# Patient Record
Sex: FEMALE | Race: AMERICAN INDIAN OR ALASKA NATIVE | NOT HISPANIC OR LATINO | URBAN - METROPOLITAN AREA
[De-identification: names, ages, dates, MRNs, and addresses within clinical notes are randomized per-mention and may not be internally consistent; named-entity substitution may affect disease eponyms.]

---

## 2022-11-23 ENCOUNTER — TELEPHONE (OUTPATIENT)
Dept: CARDIOLOGY | Facility: CLINIC | Age: 47
End: 2022-11-23

## 2022-12-06 ENCOUNTER — CARE COORDINATION (OUTPATIENT)
Dept: CARDIOLOGY | Facility: CLINIC | Age: 47
End: 2022-12-06

## 2022-12-06 PROBLEM — I10 HYPERTENSION: Status: ACTIVE | Noted: 2022-12-06

## 2022-12-06 PROBLEM — Z79.4 TYPE 2 DIABETES MELLITUS WITHOUT COMPLICATION, WITH LONG-TERM CURRENT USE OF INSULIN (H): Status: ACTIVE | Noted: 2018-04-18

## 2022-12-06 PROBLEM — N17.9 AKI (ACUTE KIDNEY INJURY) (H): Status: ACTIVE | Noted: 2022-07-15

## 2022-12-06 PROBLEM — F15.10 AMPHETAMINE OR STIMULANT DRUG ABUSE (H): Status: ACTIVE | Noted: 2021-03-30

## 2022-12-06 PROBLEM — R91.1 LUNG NODULE: Status: ACTIVE | Noted: 2022-02-28

## 2022-12-06 PROBLEM — E11.9 TYPE 2 DIABETES MELLITUS WITHOUT COMPLICATION, WITH LONG-TERM CURRENT USE OF INSULIN (H): Status: ACTIVE | Noted: 2018-04-18

## 2022-12-06 PROBLEM — E87.6 HYPOKALEMIA: Status: ACTIVE | Noted: 2021-10-20

## 2022-12-06 PROBLEM — J34.3 HYPERTROPHY OF BOTH INFERIOR NASAL TURBINATES: Status: ACTIVE | Noted: 2019-03-09

## 2022-12-06 PROBLEM — I42.8 NONISCHEMIC CARDIOMYOPATHY (H): Status: ACTIVE | Noted: 2021-11-03

## 2022-12-06 PROBLEM — J34.2 DNS (DEVIATED NASAL SEPTUM): Status: ACTIVE | Noted: 2019-03-09

## 2022-12-06 RX ORDER — CARVEDILOL 3.12 MG/1
3.12 TABLET ORAL 2 TIMES DAILY
Status: ON HOLD | COMMUNITY
Start: 2022-06-15 | End: 2023-09-11

## 2022-12-06 RX ORDER — DAPAGLIFLOZIN 10 MG/1
10 TABLET, FILM COATED ORAL EVERY MORNING
Status: ON HOLD | COMMUNITY
Start: 2022-11-23 | End: 2023-09-11

## 2022-12-06 RX ORDER — ATORVASTATIN CALCIUM 20 MG/1
20 TABLET, FILM COATED ORAL AT BEDTIME
Status: ON HOLD | COMMUNITY
Start: 2022-11-23 | End: 2023-09-11

## 2022-12-06 RX ORDER — FOLIC ACID 1 MG/1
1 TABLET ORAL DAILY
Status: ON HOLD | COMMUNITY
Start: 2022-06-16 | End: 2023-09-11

## 2022-12-06 RX ORDER — METOLAZONE 2.5 MG/1
2.5 TABLET ORAL DAILY PRN
Status: ON HOLD | COMMUNITY
Start: 2022-11-09 | End: 2023-09-11

## 2022-12-06 RX ORDER — POTASSIUM CHLORIDE 1500 MG/1
60 TABLET, EXTENDED RELEASE ORAL 3 TIMES DAILY
Status: ON HOLD | COMMUNITY
Start: 2022-10-17 | End: 2023-09-11

## 2022-12-06 RX ORDER — SPIRONOLACTONE 25 MG/1
50 TABLET ORAL EVERY MORNING
Status: ON HOLD | COMMUNITY
Start: 2022-11-09 | End: 2023-09-11

## 2022-12-06 RX ORDER — SACUBITRIL AND VALSARTAN 97; 103 MG/1; MG/1
1 TABLET, FILM COATED ORAL 2 TIMES DAILY
Status: ON HOLD | COMMUNITY
End: 2023-09-11

## 2022-12-06 RX ORDER — TORSEMIDE 20 MG/1
100 TABLET ORAL 2 TIMES DAILY
COMMUNITY
Start: 2022-11-09

## 2022-12-06 NOTE — PROGRESS NOTES
December 6, 2022     Dear Colleagues,  I had the pleasure of seeing Jamilah Jiménez  in the [facility]. As you know she is a 47 year old female with HFrEF, Class III, Stage C-D, DM2, HTN, history of PE who was referred to the clinic for further evaluation and potential advanced heart failure therapies consideration. She was admitted 10/2022 following a RHC that showed cardiogenic shock with elevated filling pressures, pulmonary hypertension and low cardiac output (RA 12, PA 75/33 (48), PW 38, Amina CI 1.7). Note that she did have several ER visits over the past years.    Patient previously followed with [place] [facility]ology.  Had LHC in 2020 with no significant CAD reportedly.  Presented to Dr. Allen's clinic 6/1/22 and found to be in decompensated HF.  Was admitted for IV diuretics.  LVEF at that time reported 20-25%.  Transferred to [facility].  Noted to have ION and hypotension.  Was given IVF resuscitation, ION and hypotension felt to be 2/2 overdiuresis.  Limited TTE while hospitalized with LVEF 35-40% Reintroduced HF GDMT at reduced doses while hospitalized and discharged off diuretics but had diuretics uptitrated as outpatient.  Was admitted 7/15 with ION and hypokalemia.  Potassium was replaced and ION resolved.  Of note PCWP was 38 when weight was 169 lbs. She was diuresed with symptom improvement (less orthopnea and chest heaviness) however weight remained 168-170 lbs. Neg 9.8 L however I/O off with no intake documented at times. Had mild ION with aggressive diuresing however when slowed down, renal function improved. Entresto titrated up to high dose. With her ION, spironolactone was stopped but will reintroduce at discharge at lower dose lawanda to help her hypokalemia.   Today she notes that she continues to feel sluggish very tired and fatigued also short of breath.  Whenever she walks and does anything then she has significant cough that has not changed much at all unfortunately.  She  is unable to lay flat due to significant shortness of breath.  Overall significantly limited.     PAST MEDICAL HISTORY:  Acute on chronic systolic heart failure  Cardiogenic shock -   ION during admission  Hypokalemia  T2DM with hypergylemia  Primary HTN  Hyperlipidemia  Depression, anxiety    FAMILY HISTORY:  No relevant past family history    SOCIAL HISTORY:     CURRENT MEDICATIONS:  Coreg 3.125mg po BID  Torsemide 80mg po BID  spironolactone 12.5mg po daily  Farxiga 10 mg po daily  Entresto 97/103 mg po BID  KCL 80 meq po BID    ROS:   Constitutional: No fever, chills, or sweats.   ENT: No visual disturbance, ear ache, epistaxis, sore throat.   Allergies/Immunologic: Negative.   Respiratory: No cough, hemoptysis.   Cardiovascular: As per HPI.   GI: No nausea, vomiting, hematemesis, melena, or hematochezia.   : No urinary frequency, dysuria, or hematuria.   Integument: Negative.   Psychiatric: Pleasant, no major depression noted  Neuro: No focal neurological deficits noted  Endocrinology: Negative.   Musculoskeletal: As per HPI.      EXAM:  Today in clinic her blood pressure is 128/78 mmHg pulse rate is 84 bpm her weight is 185.2 pounds which is at least 5 pounds more than it was yesterday reportedly.  General: appears comfortable, alert and oriented  Head: normocephalic, atraumatic  Eyes: anicteric sclera, EOMI , PERRL  Neck: no adenopathy  Orophyarynx: moist mucosa, no lesions noted  Heart: regular, S1/S2, no murmurs, rubs or gallop. Estimated JVP at 7 cmH2O  Lungs: CTAB, No wheezing.   Abdomen: soft, non-tender, bowel sounds present, no hepatosplenomegaly but suspect some ascites is present  Extremities: No LE edema today  Skin: no open lesions noted  Neuro: grossly non-focal     Labs: Hypokalemia. Creatinine minimally elevated.  On discussion with the team and lab review she does have significant fluctuation in her potassium level between the twos and 5.  I suspect this is related to diuretic and  metolazone use    TTE 6/30/22:  Left Ventricle: The left ventricle is severely dilated. There is  mild-to-moderate left ventricular hypertrophy. Severely reduced left ventricular systolic function. The EF is visually estimated to be 30-35%. Severe global hypokinesis of the left ventricular wall segments.   Right Ventricle: The right ventricle appears normal in size. Systolic function is normal.   Mitral Valve: There is mild regurgitation.   IVC/SVC: Normal IVC size with normal respirophasic changes. Pulmonic Artery: There is no pulmonary hypertension.     TTE 10/6/22:  Left Ventricle: The left ventricle is severely dilated. There is mild-to-moderate left ventricular hypertrophy. The EF is visually estimated to be 30-35%. Severe global hypokinesis of the left ventricular wall segments. LVEDD 6.2cm    ASSESSMENT AND PLAN:  In summary, patient is a 47 year old very with above past medical history including recent admission for cardiogenic shock with very significant elevated wedge pressure 38 mmHg and reduced cardiac index who was referred for further evaluation for potential advanced heart failure therapies.  I am not exactly sure at this point of her underlying etiology, it seems a little strange to me that her ejection fraction is 35% yet her cardiac index was so low and the wedge pressure was significantly elevated.  Unless her blood pressure was very high at the time of the cath but reportedly it was not.  She also has a very significant family history of sudden cardiac death and heart failure including his father and multiple other more remote family members as such genetic conditions should be on the differential.  I do not find any ischemic evaluation for him, potentially it was not done in the setting of a respiratory 8 which she would not have tolerated.  I am concerned that she continues to cough and she has significant PND and orthopnea.  At this time I think it is okay for her today and A dose of  metolazone today we will check her labs as well here with the team.  We will add 5 mg of amlodipine given that her blood pressure remained a little bit above 120 mmHg systolic and I believe she will tolerate this.  She is on max dose of Entresto at this time.  Also we will do further evaluation and bring her down to [place] to do the following  - We will do repeat right heart catheterization in the device pressure is appropriate and she is able to lay flat then we will proceed with a coronary angiogram unless we can get the records from [place]  -.  Proceed with cardiac MRI with contrast to evaluate other structural abnormalities  - We will refer her for genetic testing  We will have to see how she does may need admission for further optimization.  Otherwise she is on good medical therapy would continue these.  I would not increase her beta-blocker given her cardiac index was significantly reduced on most recent catheterization.  This might be reconsidered after repeat measurements.    Disposition: We will set her up for the above study [place].  Patient and team aware and in agreement.     I appreciate the opportunity to participate in the care of Jamilah Jiménez . Please do not hesitate to contact me with any further questions.    Sincerely,   Jaret Saucedo MD  [facility] Division of Cardiology

## 2022-12-07 ENCOUNTER — TELEPHONE (OUTPATIENT)
Dept: CARDIOLOGY | Facility: CLINIC | Age: 47
End: 2022-12-07

## 2022-12-07 ENCOUNTER — OFFICE VISIT (OUTPATIENT)
Dept: CARDIOLOGY | Facility: OTHER | Age: 47
End: 2022-12-07
Payer: MEDICAID

## 2022-12-07 DIAGNOSIS — I50.21 ACUTE HFREF (HEART FAILURE WITH REDUCED EJECTION FRACTION) (H): Primary | ICD-10-CM

## 2022-12-07 DIAGNOSIS — E78.2 MIXED HYPERLIPIDEMIA: ICD-10-CM

## 2022-12-07 DIAGNOSIS — I50.20 HEART FAILURE WITH REDUCED EJECTION FRACTION, NYHA CLASS III (H): ICD-10-CM

## 2022-12-07 DIAGNOSIS — I42.8 NONISCHEMIC CARDIOMYOPATHY (H): ICD-10-CM

## 2022-12-07 DIAGNOSIS — I51.89 OTHER ILL-DEFINED HEART DISEASES: ICD-10-CM

## 2022-12-07 DIAGNOSIS — I10 BENIGN ESSENTIAL HYPERTENSION: ICD-10-CM

## 2022-12-07 DIAGNOSIS — R57.0 CARDIOGENIC SHOCK (H): ICD-10-CM

## 2022-12-07 PROCEDURE — 99204 OFFICE O/P NEW MOD 45 MIN: CPT | Performed by: INTERNAL MEDICINE

## 2022-12-07 RX ORDER — LIDOCAINE 40 MG/G
CREAM TOPICAL
Status: CANCELLED | OUTPATIENT
Start: 2022-12-07

## 2022-12-07 NOTE — PATIENT INSTRUCTIONS
You were seen today by [facility] Cardiologist, Dr. Saucedo, in partnership with [facility] in [place], [place]    Dr. Saucedo is recommending a cardiac MRI and a right heart cath to be done at [facility]. You will be called to arrange these appointments.    Genetics testing and visit is also recommended. Someone will reach out to you to help schedule this.    Jim Aguiar RN  Cardiology Care Coordinator  [phone]          Medication or Plan of Care changes:        Follow up:         Questions:    For concerns or questions regarding your heart care please contact your [facility] at [phone]. If there are any questions specifically about this visit please call [facility] at  [phone].    Follow the American Heart Association Diet and Lifestyle recommendations:    Limit saturated fat, trans fat, sodium, red meat, sweets and sugar-sweetened beverages.   If you choose to eat red meat, compare labels and select the leanest cuts available.  Aim for at least 150 minutes of moderate physical activity or 75 minutes of vigorous physical activity - or an equal combination of both - each week.

## 2022-12-07 NOTE — LETTER
Date:December 8, 2022      Patient was self referred, no letter generated. Do not send.        [employer] Health Information

## 2022-12-07 NOTE — NURSING NOTE
Chief Complaint   Patient presents with     New Patient     Unable to contact patient to review medications. Vitals will be taken by Sherwin COLÓN.

## 2022-12-07 NOTE — LETTER
12/7/2022      RE: Jamilah Jiménez  [address]  [place] [address]       Dear Colleague,    Thank you for the opportunity to participate in the care of your patient, Jamilah Jiménez, at the [facility] [employer] [place] at [facility]. Please see a copy of my visit note below.    December 6, 2022     Dear Colleagues,  I had the pleasure of seeing Jamilah Jiménez  in the [facility]. As you know she is a 47 year old female with HFrEF, Class III, Stage C-D, DM2, HTN, history of PE who was referred to the clinic for further evaluation and potential advanced heart failure therapies consideration. She was admitted 10/2022 following a RHC that showed cardiogenic shock with elevated filling pressures, pulmonary hypertension and low cardiac output (RA 12, PA 75/33 (48), PW 38, Aimna CI 1.7). Note that she did have several ER visits over the past years.    Patient previously followed with [facility].  Had LHC in 2020 with no significant CAD reportedly.  Presented to Dr. Allen's clinic 6/1/22 and found to be in decompensated HF.  Was admitted for IV diuretics.  LVEF at that time reported 20-25%.  Transferred to [facility].  Noted to have ION and hypotension.  Was given IVF resuscitation, ION and hypotension felt to be 2/2 overdiuresis.  Limited TTE while hospitalized with LVEF 35-40% Reintroduced HF GDMT at reduced doses while hospitalized and discharged off diuretics but had diuretics uptitrated as outpatient.  Was admitted 7/15 with ION and hypokalemia.  Potassium was replaced and ION resolved.  Of note PCWP was 38 when weight was 169 lbs. She was diuresed with symptom improvement (less orthopnea and chest heaviness) however weight remained 168-170 lbs. Neg 9.8 L however I/O off with no intake documented at times. Had mild ION with aggressive diuresing however when slowed down, renal function improved. Entresto titrated up to high  dose. With her ION, spironolactone was stopped but will reintroduce at discharge at lower dose lawanda to help her hypokalemia.   Today she notes that she continues to feel sluggish very tired and fatigued also short of breath.  Whenever she walks and does anything then she has significant cough that has not changed much at all unfortunately.  She is unable to lay flat due to significant shortness of breath.  Overall significantly limited.     PAST MEDICAL HISTORY:  Acute on chronic systolic heart failure  Cardiogenic shock -   ION during admission  Hypokalemia  T2DM with hypergylemia  Primary HTN  Hyperlipidemia  Depression, anxiety    FAMILY HISTORY:  No relevant past family history    SOCIAL HISTORY:     CURRENT MEDICATIONS:  Coreg 3.125mg po BID  Torsemide 80mg po BID  spironolactone 12.5mg po daily  Farxiga 10 mg po daily  Entresto 97/103 mg po BID  KCL 80 meq po BID    ROS:   Constitutional: No fever, chills, or sweats.   ENT: No visual disturbance, ear ache, epistaxis, sore throat.   Allergies/Immunologic: Negative.   Respiratory: No cough, hemoptysis.   Cardiovascular: As per HPI.   GI: No nausea, vomiting, hematemesis, melena, or hematochezia.   : No urinary frequency, dysuria, or hematuria.   Integument: Negative.   Psychiatric: Pleasant, no major depression noted  Neuro: No focal neurological deficits noted  Endocrinology: Negative.   Musculoskeletal: As per HPI.      EXAM:  Today in clinic her blood pressure is 128/78 mmHg pulse rate is 84 bpm her weight is 185.2 pounds which is at least 5 pounds more than it was yesterday reportedly.  General: appears comfortable, alert and oriented  Head: normocephalic, atraumatic  Eyes: anicteric sclera, EOMI , PERRL  Neck: no adenopathy  Orophyarynx: moist mucosa, no lesions noted  Heart: regular, S1/S2, no murmurs, rubs or gallop. Estimated JVP at 7 cmH2O  Lungs: CTAB, No wheezing.   Abdomen: soft, non-tender, bowel sounds present, no hepatosplenomegaly but  suspect some ascites is present  Extremities: No LE edema today  Skin: no open lesions noted  Neuro: grossly non-focal     Labs: Hypokalemia. Creatinine minimally elevated.  On discussion with the team and lab review she does have significant fluctuation in her potassium level between the twos and 5.  I suspect this is related to diuretic and metolazone use    TTE 6/30/22:  Left Ventricle: The left ventricle is severely dilated. There is  mild-to-moderate left ventricular hypertrophy. Severely reduced left ventricular systolic function. The EF is visually estimated to be 30-35%. Severe global hypokinesis of the left ventricular wall segments.   Right Ventricle: The right ventricle appears normal in size. Systolic function is normal.   Mitral Valve: There is mild regurgitation.   IVC/SVC: Normal IVC size with normal respirophasic changes. Pulmonic Artery: There is no pulmonary hypertension.     TTE 10/6/22:  Left Ventricle: The left ventricle is severely dilated. There is mild-to-moderate left ventricular hypertrophy. The EF is visually estimated to be 30-35%. Severe global hypokinesis of the left ventricular wall segments. LVEDD 6.2cm    ASSESSMENT AND PLAN:  In summary, patient is a 47 year old very with above past medical history including recent admission for cardiogenic shock with very significant elevated wedge pressure 38 mmHg and reduced cardiac index who was referred for further evaluation for potential advanced heart failure therapies.  I am not exactly sure at this point of her underlying etiology, it seems a little strange to me that her ejection fraction is 35% yet her cardiac index was so low and the wedge pressure was significantly elevated.  Unless her blood pressure was very high at the time of the cath but reportedly it was not.  She also has a very significant family history of sudden cardiac death and heart failure including his father and multiple other more remote family members as such genetic  conditions should be on the differential.  I do not find any ischemic evaluation for him, potentially it was not done in the setting of a respiratory 8 which she would not have tolerated.  I am concerned that she continues to cough and she has significant PND and orthopnea.  At this time I think it is okay for her today and A dose of metolazone today we will check her labs as well here with the team.  We will add 5 mg of amlodipine given that her blood pressure remained a little bit above 120 mmHg systolic and I believe she will tolerate this.  She is on max dose of Entresto at this time.  Also we will do further evaluation and bring her down to [place] to do the following  - We will do repeat right heart catheterization in the device pressure is appropriate and she is able to lay flat then we will proceed with a coronary angiogram unless we can get the records from [place]  -.  Proceed with cardiac MRI with contrast to evaluate other structural abnormalities  - We will refer her for genetic testing  We will have to see how she does may need admission for further optimization.  Otherwise she is on good medical therapy would continue these.  I would not increase her beta-blocker given her cardiac index was significantly reduced on most recent catheterization.  This might be reconsidered after repeat measurements.    Disposition: We will set her up for the above study [place].  Patient and team aware and in agreement.     I appreciate the opportunity to participate in the care of Jamilah Jiménez . Please do not hesitate to contact me with any further questions.    Sincerely,   Jaret Saucedo MD  [facility] Division of Cardiology           Please do not hesitate to contact me if you have any questions/concerns.     Sincerely,     Jaret Saucedo MD

## 2022-12-20 DIAGNOSIS — I50.20 HEART FAILURE WITH REDUCED EJECTION FRACTION, NYHA CLASS III (H): Primary | ICD-10-CM

## 2023-01-09 ENCOUNTER — TELEPHONE (OUTPATIENT)
Dept: CARDIOLOGY | Facility: CLINIC | Age: 48
End: 2023-01-09

## 2023-01-09 NOTE — TELEPHONE ENCOUNTER
This is a patient that was seen in [place]. She is scheduled for a cardiac MRI and right heart cath here on 2/28 and 3/1. Can you please schedule a follow up visit, in person or virtual, in [place] in April please

## 2023-02-28 ENCOUNTER — TELEPHONE (OUTPATIENT)
Dept: CARDIOLOGY | Facility: CLINIC | Age: 48
End: 2023-02-28

## 2023-02-28 ENCOUNTER — CARE COORDINATION (OUTPATIENT)
Dept: CARDIOLOGY | Facility: CLINIC | Age: 48
End: 2023-02-28

## 2023-02-28 DIAGNOSIS — I50.20 HEART FAILURE WITH REDUCED EJECTION FRACTION, NYHA CLASS III (H): Primary | ICD-10-CM

## 2023-02-28 DIAGNOSIS — Z45.02 ENCOUNTER FOR INTERROGATION OF CARDIAC DEFIBRILLATOR: Primary | ICD-10-CM

## 2023-02-28 NOTE — TELEPHONE ENCOUNTER
Call complete for pre procedure reminder and updated visitor policy.  Pt states due to the weather where she lives, she was unable to make it here today for all of her other appts, and has not been able to talk to anyone about it yet.  Informed her I will get a message sent to Jim regarding missed appts today and tomorrows scheduled appt.

## 2023-08-28 ENCOUNTER — PATIENT OUTREACH (OUTPATIENT)
Dept: CARDIOLOGY | Facility: CLINIC | Age: 48
End: 2023-08-28
Payer: MEDICAID

## 2023-08-28 NOTE — TELEPHONE ENCOUNTER
Called and spoke with patient and reviewed instructions for upcoming right hear catheterization on 8/31/23.    Pre-procedure instructions - Right heart catheterization  Patient Education    Your arrival time is 6:30 AM.  Location is [address] Waiting Room  Please plan on being at the hospital all day.  At any time, emergencies and/or urgent cases may come up which could delay the start of your procedure.    Pre-procedure instructions - Right heart catheterization  No solid food for 8 hours prior  Nothing to drink 2 hours prior to arrival time  You can take your morning medications (except diabetic and blood thinners) with sips of water  We recommend you arrange for a ride to drop you off and pick you up, in the instance, you are unable to drive home, however you should be able to function as you normally would after the procedure    Diabetic Medication Instructions  Typical instructions for insulin diabetic medication holding are below. However, please reach out to your Primary Care Provider or Endocrinologist for specific instructions  DO NOT take any oral diabetic medication, short-acting diabetes medications/insulin, humalog or regular insulin the morning of your test  Take   dose of long-acting insulin (Lantus, Levemir) the day of your test  Remember to  bring your glucometer and insulin with you to take after your test if needed  DO NOT take injectable GLP-1 agonists semaglutide (Ozempic, Wegovy), dulaglutide (Trulicity), exenatide ER (Bydureon), tirzepatide (Mounjaro), or oral semaglutide (Rybelsus) for 7 days prior your procedure  Hold once daily injectable GLP-1 agonists exenatide (Byetta), liraglutide (Saxenda, Victoza) the day before and day of your procedure                Anticoagulation Medication Instructions   NA    All questions answered to patient's satisfaction.

## 2023-08-31 ENCOUNTER — ANCILLARY PROCEDURE (OUTPATIENT)
Dept: CARDIOLOGY | Facility: CLINIC | Age: 48
End: 2023-08-31
Attending: INTERNAL MEDICINE
Payer: MEDICAID

## 2023-08-31 ENCOUNTER — APPOINTMENT (OUTPATIENT)
Dept: GENERAL RADIOLOGY | Facility: CLINIC | Age: 48
End: 2023-08-31
Payer: MEDICAID

## 2023-08-31 ENCOUNTER — APPOINTMENT (OUTPATIENT)
Dept: MEDSURG UNIT | Facility: CLINIC | Age: 48
End: 2023-08-31
Attending: INTERNAL MEDICINE
Payer: MEDICAID

## 2023-08-31 ENCOUNTER — HOSPITAL ENCOUNTER (OUTPATIENT)
Dept: MRI IMAGING | Facility: CLINIC | Age: 48
Discharge: HOME | End: 2023-08-31
Attending: INTERNAL MEDICINE
Payer: MEDICAID

## 2023-08-31 ENCOUNTER — HOSPITAL ENCOUNTER (OUTPATIENT)
Dept: GENERAL RADIOLOGY | Facility: CLINIC | Age: 48
Discharge: HOME | End: 2023-08-31
Attending: INTERNAL MEDICINE
Payer: MEDICAID

## 2023-08-31 ENCOUNTER — REFERRAL (OUTPATIENT)
Dept: TRANSPLANT | Facility: CLINIC | Age: 48
End: 2023-08-31

## 2023-08-31 ENCOUNTER — APPOINTMENT (OUTPATIENT)
Dept: LAB | Facility: CLINIC | Age: 48
End: 2023-08-31
Attending: INTERNAL MEDICINE
Payer: MEDICAID

## 2023-08-31 ENCOUNTER — HOSPITAL ENCOUNTER (INPATIENT)
Facility: CLINIC | Age: 48
LOS: 11 days | Discharge: HOME | DRG: 286 | End: 2023-09-11
Attending: INTERNAL MEDICINE | Admitting: INTERNAL MEDICINE
Payer: MEDICAID

## 2023-08-31 DIAGNOSIS — I50.21 ACUTE HFREF (HEART FAILURE WITH REDUCED EJECTION FRACTION) (H): ICD-10-CM

## 2023-08-31 DIAGNOSIS — R57.0 CARDIOGENIC SHOCK (H): ICD-10-CM

## 2023-08-31 DIAGNOSIS — I42.8 NONISCHEMIC CARDIOMYOPATHY (H): ICD-10-CM

## 2023-08-31 DIAGNOSIS — I50.20 HEART FAILURE WITH REDUCED EJECTION FRACTION, NYHA CLASS III (H): ICD-10-CM

## 2023-08-31 DIAGNOSIS — I50.20 HEART FAILURE WITH REDUCED EJECTION FRACTION, NYHA CLASS III (H): Primary | ICD-10-CM

## 2023-08-31 DIAGNOSIS — E78.2 MIXED HYPERLIPIDEMIA: ICD-10-CM

## 2023-08-31 DIAGNOSIS — F41.9 ANXIETY: Chronic | ICD-10-CM

## 2023-08-31 DIAGNOSIS — I51.89 OTHER ILL-DEFINED HEART DISEASES: ICD-10-CM

## 2023-08-31 DIAGNOSIS — E11.9 TYPE 2 DIABETES MELLITUS WITHOUT COMPLICATION, WITH LONG-TERM CURRENT USE OF INSULIN (H): Primary | ICD-10-CM

## 2023-08-31 DIAGNOSIS — I10 BENIGN ESSENTIAL HYPERTENSION: ICD-10-CM

## 2023-08-31 DIAGNOSIS — Z79.4 TYPE 2 DIABETES MELLITUS WITHOUT COMPLICATION, WITH LONG-TERM CURRENT USE OF INSULIN (H): Primary | ICD-10-CM

## 2023-08-31 LAB
ANION GAP SERPL CALCULATED.3IONS-SCNC: 12 MMOL/L (ref 7–15)
BUN SERPL-MCNC: 17 MG/DL (ref 6–20)
CALCIUM SERPL-MCNC: 8.3 MG/DL (ref 8.6–10)
CHLORIDE SERPL-SCNC: 104 MMOL/L (ref 98–107)
CREAT SERPL-MCNC: 1.02 MG/DL (ref 0.51–0.95)
DEPRECATED HCO3 PLAS-SCNC: 24 MMOL/L (ref 22–29)
ERYTHROCYTE [DISTWIDTH] IN BLOOD BY AUTOMATED COUNT: 14.1 % (ref 10–15)
GFR SERPL CREATININE-BSD FRML MDRD: 69 ML/MIN/1.73M2
GLUCOSE BLDC GLUCOMTR-MCNC: 177 MG/DL (ref 70–99)
GLUCOSE BLDC GLUCOMTR-MCNC: 215 MG/DL (ref 70–99)
GLUCOSE BLDC GLUCOMTR-MCNC: 227 MG/DL (ref 70–99)
GLUCOSE SERPL-MCNC: 178 MG/DL (ref 70–99)
HCT VFR BLD AUTO: 41.2 % (ref 35–47)
HGB BLD-MCNC: 13.8 G/DL (ref 11.7–15.7)
HOLD SPECIMEN: NORMAL
INR PPP: 1.05 (ref 0.85–1.15)
LVEF ECHO: NORMAL
MCH RBC QN AUTO: 27.9 PG (ref 26.5–33)
MCHC RBC AUTO-ENTMCNC: 33.5 G/DL (ref 31.5–36.5)
MCV RBC AUTO: 83 FL (ref 78–100)
NT-PROBNP SERPL-MCNC: 1425 PG/ML (ref 0–450)
PLATELET # BLD AUTO: 214 10E3/UL (ref 150–450)
POTASSIUM SERPL-SCNC: 3.4 MMOL/L (ref 3.4–5.3)
RBC # BLD AUTO: 4.95 10E6/UL (ref 3.8–5.2)
SODIUM SERPL-SCNC: 140 MMOL/L (ref 136–145)
WBC # BLD AUTO: 5.9 10E3/UL (ref 4–11)

## 2023-08-31 PROCEDURE — A9585 GADOBUTROL INJECTION: HCPCS | Mod: JZ | Performed by: INTERNAL MEDICINE

## 2023-08-31 PROCEDURE — 93451 RIGHT HEART CATH: CPT | Mod: 26 | Performed by: INTERNAL MEDICINE

## 2023-08-31 PROCEDURE — 250N000012 HC RX MED GY IP 250 OP 636 PS 637: Performed by: INTERNAL MEDICINE

## 2023-08-31 PROCEDURE — 85027 COMPLETE CBC AUTOMATED: CPT | Performed by: STUDENT IN AN ORGANIZED HEALTH CARE EDUCATION/TRAINING PROGRAM

## 2023-08-31 PROCEDURE — 71046 X-RAY EXAM CHEST 2 VIEWS: CPT

## 2023-08-31 PROCEDURE — 71046 X-RAY EXAM CHEST 2 VIEWS: CPT | Mod: 26 | Performed by: RADIOLOGY

## 2023-08-31 PROCEDURE — 250N000009 HC RX 250: Performed by: INTERNAL MEDICINE

## 2023-08-31 PROCEDURE — 99223 1ST HOSP IP/OBS HIGH 75: CPT | Mod: 25 | Performed by: INTERNAL MEDICINE

## 2023-08-31 PROCEDURE — 75561 CARDIAC MRI FOR MORPH W/DYE: CPT | Mod: 26 | Performed by: STUDENT IN AN ORGANIZED HEALTH CARE EDUCATION/TRAINING PROGRAM

## 2023-08-31 PROCEDURE — 250N000013 HC RX MED GY IP 250 OP 250 PS 637: Performed by: STUDENT IN AN ORGANIZED HEALTH CARE EDUCATION/TRAINING PROGRAM

## 2023-08-31 PROCEDURE — 93451 RIGHT HEART CATH: CPT | Performed by: INTERNAL MEDICINE

## 2023-08-31 PROCEDURE — 255N000002 HC RX 255 OP 636: Performed by: STUDENT IN AN ORGANIZED HEALTH CARE EDUCATION/TRAINING PROGRAM

## 2023-08-31 PROCEDURE — 93287 PERI-PX DEVICE EVAL & PRGR: CPT | Mod: 26 | Performed by: INTERNAL MEDICINE

## 2023-08-31 PROCEDURE — 4A023N6 MEASUREMENT OF CARDIAC SAMPLING AND PRESSURE, RIGHT HEART, PERCUTANEOUS APPROACH: ICD-10-PCS | Performed by: INTERNAL MEDICINE

## 2023-08-31 PROCEDURE — 93010 ELECTROCARDIOGRAM REPORT: CPT | Mod: XU | Performed by: INTERNAL MEDICINE

## 2023-08-31 PROCEDURE — C1751 CATH, INF, PER/CENT/MIDLINE: HCPCS | Performed by: INTERNAL MEDICINE

## 2023-08-31 PROCEDURE — 93287 PERI-PX DEVICE EVAL & PRGR: CPT

## 2023-08-31 PROCEDURE — 250N000013 HC RX MED GY IP 250 OP 250 PS 637

## 2023-08-31 PROCEDURE — 80048 BASIC METABOLIC PNL TOTAL CA: CPT | Performed by: INTERNAL MEDICINE

## 2023-08-31 PROCEDURE — 272N000001 HC OR GENERAL SUPPLY STERILE: Performed by: INTERNAL MEDICINE

## 2023-08-31 PROCEDURE — 120N000003 HC R&B IMCU UMMC

## 2023-08-31 PROCEDURE — 71045 X-RAY EXAM CHEST 1 VIEW: CPT | Mod: 26 | Performed by: RADIOLOGY

## 2023-08-31 PROCEDURE — 999N000142 HC STATISTIC PROCEDURE PREP ONLY

## 2023-08-31 PROCEDURE — 83880 ASSAY OF NATRIURETIC PEPTIDE: CPT | Performed by: INTERNAL MEDICINE

## 2023-08-31 PROCEDURE — 250N000013 HC RX MED GY IP 250 OP 250 PS 637: Performed by: INTERNAL MEDICINE

## 2023-08-31 PROCEDURE — 85610 PROTHROMBIN TIME: CPT | Performed by: INTERNAL MEDICINE

## 2023-08-31 PROCEDURE — 93005 ELECTROCARDIOGRAM TRACING: CPT

## 2023-08-31 PROCEDURE — 75561 CARDIAC MRI FOR MORPH W/DYE: CPT

## 2023-08-31 PROCEDURE — 36415 COLL VENOUS BLD VENIPUNCTURE: CPT | Performed by: INTERNAL MEDICINE

## 2023-08-31 PROCEDURE — 71045 X-RAY EXAM CHEST 1 VIEW: CPT

## 2023-08-31 PROCEDURE — 255N000002 HC RX 255 OP 636: Mod: JZ | Performed by: INTERNAL MEDICINE

## 2023-08-31 PROCEDURE — 999N000134 HC STATISTIC PP CARE STAGE 3

## 2023-08-31 RX ORDER — DIGOXIN 125 MCG
250 TABLET ORAL DAILY
Status: ON HOLD | COMMUNITY
End: 2023-09-11

## 2023-08-31 RX ORDER — SERTRALINE HYDROCHLORIDE 100 MG/1
100 TABLET, FILM COATED ORAL DAILY
Status: ON HOLD | COMMUNITY
End: 2023-09-11

## 2023-08-31 RX ORDER — DAPAGLIFLOZIN 10 MG/1
10 TABLET, FILM COATED ORAL EVERY MORNING
Status: DISCONTINUED | OUTPATIENT
Start: 2023-09-01 | End: 2023-09-02

## 2023-08-31 RX ORDER — HYDRALAZINE HYDROCHLORIDE 25 MG/1
25 TABLET, FILM COATED ORAL 3 TIMES DAILY
Status: DISCONTINUED | OUTPATIENT
Start: 2023-08-31 | End: 2023-09-02

## 2023-08-31 RX ORDER — ATORVASTATIN CALCIUM 10 MG/1
20 TABLET, FILM COATED ORAL AT BEDTIME
Status: DISCONTINUED | OUTPATIENT
Start: 2023-08-31 | End: 2023-09-02

## 2023-08-31 RX ORDER — TORSEMIDE 100 MG/1
100 TABLET ORAL 2 TIMES DAILY
Status: DISCONTINUED | OUTPATIENT
Start: 2023-08-31 | End: 2023-09-02

## 2023-08-31 RX ORDER — LIDOCAINE 40 MG/G
CREAM TOPICAL
Status: COMPLETED | OUTPATIENT
Start: 2023-08-31 | End: 2023-08-31

## 2023-08-31 RX ORDER — ACETAMINOPHEN 325 MG/1
650 TABLET ORAL EVERY 4 HOURS PRN
Status: DISCONTINUED | OUTPATIENT
Start: 2023-08-31 | End: 2023-09-02

## 2023-08-31 RX ORDER — POTASSIUM CHLORIDE 1500 MG/1
60 TABLET, EXTENDED RELEASE ORAL 3 TIMES DAILY
Status: DISCONTINUED | OUTPATIENT
Start: 2023-08-31 | End: 2023-09-02

## 2023-08-31 RX ORDER — ACETAMINOPHEN 500 MG
1000 TABLET ORAL ONCE
Status: COMPLETED | OUTPATIENT
Start: 2023-08-31 | End: 2023-08-31

## 2023-08-31 RX ORDER — SPIRONOLACTONE 25 MG/1
50 TABLET ORAL EVERY MORNING
Status: DISCONTINUED | OUTPATIENT
Start: 2023-09-01 | End: 2023-09-02

## 2023-08-31 RX ORDER — GADOBUTROL 604.72 MG/ML
10 INJECTION INTRAVENOUS ONCE
Status: COMPLETED | OUTPATIENT
Start: 2023-08-31 | End: 2023-08-31

## 2023-08-31 RX ORDER — BENZONATATE 200 MG/1
200 CAPSULE ORAL 3 TIMES DAILY PRN
Status: ON HOLD | COMMUNITY
End: 2023-09-11

## 2023-08-31 RX ORDER — NICOTINE POLACRILEX 4 MG
15-30 LOZENGE BUCCAL
Status: DISCONTINUED | OUTPATIENT
Start: 2023-08-31 | End: 2023-09-11 | Stop reason: HOSPADM

## 2023-08-31 RX ORDER — DEXTROSE MONOHYDRATE 25 G/50ML
25-50 INJECTION, SOLUTION INTRAVENOUS
Status: DISCONTINUED | OUTPATIENT
Start: 2023-08-31 | End: 2023-09-11 | Stop reason: HOSPADM

## 2023-08-31 RX ORDER — INSULIN ASPART 100 [IU]/ML
1-10 INJECTION, SOLUTION INTRAVENOUS; SUBCUTANEOUS
COMMUNITY

## 2023-08-31 RX ORDER — NITROGLYCERIN 0.4 MG/1
0.4 TABLET SUBLINGUAL EVERY 5 MIN PRN
Status: ON HOLD | COMMUNITY
End: 2023-09-11

## 2023-08-31 RX ORDER — ERGOCALCIFEROL (VITAMIN D2) 10 MCG
400 TABLET ORAL DAILY
Status: ON HOLD | COMMUNITY
End: 2023-09-11

## 2023-08-31 RX ADMIN — HYDRALAZINE HYDROCHLORIDE 25 MG: 25 TABLET, FILM COATED ORAL at 21:27

## 2023-08-31 RX ADMIN — INSULIN ASPART 1 UNITS: 100 INJECTION, SOLUTION INTRAVENOUS; SUBCUTANEOUS at 19:04

## 2023-08-31 RX ADMIN — INSULIN DETEMIR 30 UNITS: 100 INJECTION, SOLUTION SUBCUTANEOUS at 22:58

## 2023-08-31 RX ADMIN — SACUBITRIL AND VALSARTAN 1 TABLET: 97; 103 TABLET, FILM COATED ORAL at 20:55

## 2023-08-31 RX ADMIN — LIDOCAINE: 40 CREAM TOPICAL at 09:37

## 2023-08-31 RX ADMIN — ACETAMINOPHEN 1000 MG: 500 TABLET ORAL at 13:55

## 2023-08-31 RX ADMIN — POTASSIUM CHLORIDE 60 MEQ: 1500 TABLET, EXTENDED RELEASE ORAL at 20:55

## 2023-08-31 RX ADMIN — HUMAN ALBUMIN MICROSPHERES AND PERFLUTREN 6 ML: 10; .22 INJECTION, SOLUTION INTRAVENOUS at 15:33

## 2023-08-31 RX ADMIN — TORSEMIDE 100 MG: 100 TABLET ORAL at 18:25

## 2023-08-31 RX ADMIN — HYDRALAZINE HYDROCHLORIDE 25 MG: 25 TABLET, FILM COATED ORAL at 18:00

## 2023-08-31 RX ADMIN — GADOBUTROL 10 ML: 604.72 INJECTION INTRAVENOUS at 08:44

## 2023-08-31 RX ADMIN — ACETAMINOPHEN 650 MG: 325 TABLET, FILM COATED ORAL at 21:25

## 2023-08-31 ASSESSMENT — ACTIVITIES OF DAILY LIVING (ADL)
TOILETING_ISSUES: NO
VISION_MANAGEMENT: GLASSES
DOING_ERRANDS_INDEPENDENTLY_DIFFICULTY: NO
CONCENTRATING,_REMEMBERING_OR_MAKING_DECISIONS_DIFFICULTY: NO
ADLS_ACUITY_SCORE: 35
DIFFICULTY_EATING/SWALLOWING: NO
ADLS_ACUITY_SCORE: 35
WALKING_OR_CLIMBING_STAIRS_DIFFICULTY: NO
ADLS_ACUITY_SCORE: 20
DRESSING/BATHING_DIFFICULTY: NO
ADLS_ACUITY_SCORE: 20
CHANGE_IN_FUNCTIONAL_STATUS_SINCE_ONSET_OF_CURRENT_ILLNESS/INJURY: YES
FALL_HISTORY_WITHIN_LAST_SIX_MONTHS: NO
WEAR_GLASSES_OR_BLIND: YES
ADLS_ACUITY_SCORE: 35
ADLS_ACUITY_SCORE: 20
ADLS_ACUITY_SCORE: 35

## 2023-08-31 NOTE — PROGRESS NOTES
"Pt awake and alert; reports some shortness of breath, pt reports she gets this way at home. Pt took her morning home meds at 1340 (lasix included, no meds before RHC procedure), dr Ruiz aware. Pt reports neck is sore; ice applied; Dr Ruiz is aware, ordered tylenol for pain. Cardiac service to see pt re plan for admission; family at bedside for update. Will place PIV; cardiac MD aware. As family is from [place] they returned to home; will come back to pick pt up at discharge from 6C.     1610--Report to [facility] on 6C/D. Pt has voided, pt reports it is \"a lot\". Blood sugar at 1500 227, this was after pt has eaten lunch; pt continues to feel some shortness of breath, she reports neck pain is better after ice and tylenol. Echo done at bedside. Dr Cruz and team to see pt at bedside. Will transfer to 6D per wheelchair.   "

## 2023-08-31 NOTE — DISCHARGE INSTRUCTIONS
[facility]                        Interventional Cardiology  Discharge Instructions   Post Right Heart Cath      AFTER YOU GO HOME:  DO drink plenty of fluids  DO resume your regular diet and medications unless otherwise instructed by your Primary Physician  Do Not scrub the procedure site vigorously  No lotion or powder to the puncture site for 3 days    CALL YOUR PRIMARY PHYSICIAN IF: You may resume all normal activity.  Monitor neck site for bleeding, swelling, or voice changes. If you notice bleeding or swelling immediately apply pressure to the site and call number below to speak with Cardiology Fellow.  If you experience any changes in your breathing you should call your doctor immediately or come to the closest Emergency Department.  Do not drive yourself.    ADDITIONAL INSTRUCTIONS: Medications: You are to resume all home medications including anticoagulation therapy unless otherwise advised by your primary cardiologist or nurse coordinator.    Follow Up: Per your primary cardiology team    If you have any questions or concerns regarding your procedure site please call [phone] at anytime and ask for Cardiology Fellow on call.  They are available 24 hours a day.  You may also contact the Cardiology Clinic after hours number at [phone].                                                       Telephone Numbers [phone] Monday-Friday 8:00 am to 4:30 pm    [phone] After 4:30 pm Monday-Friday, Weekends & Holidays  Ask for Interventional Cardiologist on call. Someone is on call 24 hours/day   [facility] toll free number [phone] Monday-Friday 8:00 am to 4:30 pm   [facility] Emergency Dept [phone]                 Take your medicines every day, as directed     Monitor Your Weight and Symptoms    Contact us if you:    Gain 2 pounds in one day or 5 pounds in one week  Feel more short of breath  Notice more leg swelling  Feel lightheadeded   Change your  lifestyle    Limit Salt or Sodium:  2000 mg  Limit Fluids:  2000 mL or approximately 64 ounces  Eat a Heart Healthy Diet  Low in saturated fats  Stay Active:  Aim to move at least 150 minutes every  week         To Contact us    During Business Hours:  [phone], option # 1      After hours, weekends or holidays:   [phone], Option #4  Ask to speak to the On-Call Cardiologist. Inform them you are a CORE/heart failure patient at the [facility].     Use [employer] allows you to communicate directly with your heart team through secure messaging.  [employer] can be accessed any time on your phone, computer, or tablet.  If you need assistance, we'd be happy to help!         Keep your Heart Appointments:    Please reach out to your local provider as discussed to see in 1-2 weeks after discharge.    Message sent to  to reach out to you to make an appointment with Dr. Saucedo in [place].     Please consider attending our virtual support group which is held monthly. Please reach out to [place] at [phone] for more information if you are interested in attending.       2023 dates:    Monday, September 11th, 1-2pm  Monday, October 2nd, 1-2pm  Monday, November 6th, 1-2pm  Monday, December 4th, 1-2pm

## 2023-08-31 NOTE — Clinical Note
dry, intact, no bleeding and no hematoma. 7 Fr Right internal jugular venous sheath removed, manual pressure held until hemostasis. No bleeding, oozing, or hematoma.

## 2023-08-31 NOTE — H&P
[facility]    History and Physical  Cardiology     Date of Admission:  8/31/2023    Assessment & Plan   Jamilah Jiménez is a 47-year-old female with HFrEF, Class III, Stage C-D, DM2, HTN, PE admitted following RHC with CI 1.61. Admitted for further evaluation and consideration for advanced heart failure therapies.       HFrEF (LVEF 18%), non-ischemic cardiomyopathy   Hypertension   Hyperlipidemia   Patient with progressively worsening dyspnea, edema, overall functional status over the last ~ 2 years. 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 Has been on diuretics, GDMT, has ICD in place.   ACC/AHA Stage D, NYHA Symptom Class III  Primary Cardiologist: Dr. Saucedo Last seen 12/06/2022   Ischemic Eval: Coronary angiogram 12/24/2020 with no significant coronary artery disease, elevated LVEDP 24 mmHg   RHC 08/31/2023: RA 9; PA 41/22/30; Amina CO/CI 3.04/1.61   CMR 08/31/2023: Severely dilated LV with global systolic function severely reduced, LVEF 18%, severe diffuse hypokinesis, RV with normal cavity size and normal global systolic function, RVEF 53%    Plan:   Volume: hypervolemic, ascites   Diuretic: Torsemide 100 mg BID   ACE-I/ARB/ARNi: Entresto  mg BID  BB: Carvedilol 3.125 mg BID (will  hold in setting of low CI)   Aldosterone antagonist: Spironolactone 25 mg; Potassium Chloride 80 mEq TID   SGLT2i: dapagliflozin 10 mg   Hydral/Nitrates: hydralazine 25 TID   SCD prophylaxis ICD 11/2022   Statin : Atorvastatin 20 mg      DATE MAP CVP PAP PCWP Amina CO Amina CI SVR MVo2 Therapies Weight    08/31/2023  9 41/22/30 19 3.04 1.61    172 lbs    10/2022  12 76/35/50 38 3.25 1.72    169 lbs                                            Type 2 DM   Most recent Hgb A1c 7.8 on 08/10/2023  - Detemir 30 U at bedtime  - [facility]     Primary Care Physician   Physician No Ref-Primary    Chief Complaint   dyspnea on exertion    History of Present  Illness     Jamilah Jiménez 47-year-old female with HFrEF, Class III, Stage C-D, Type 2 DM, HTN, PE who s admitted for evaluation and potential advanced heart failure therapies consideration. She was last seen at [facility] 12/06/2022 after she was admitted in 10/2022 in [place] following a RHC that showed cardiogenic shock with elevated filling pressures, pulmonary hypertension and low cardiac output (RA 12, PA 75/33 (48), PW 38, Amina CI 1.7). At that time, she had titration of diuretics and GDMT, recommended repeat RHC and cardiac MR.  Today, she had RHC which showed decreased CI (1.61) and cardiac MRI with severely dilated LV with global systolic function severely reduced, normal RV size and function.     Today, she notes progressive dyspnea on exertion and rest, limited exercise tolerance, orthopnea. She has had a decreased appetite and early satiety secondary to fluid accumulation in her abdomen. She is unable to go up or down a single flight of stairs without resting.     Past Medical History    Reviewed     Past Surgical History   Reviewed     Prior to Admission Medications   Prior to Admission Medications   Prescriptions Last Dose Informant Patient Reported? Taking?   atorvastatin (LIPITOR) 20 MG tablet 8/30/2023 at 2200  Yes Yes   Sig: Take 20 mg by mouth At Bedtime   carvedilol (COREG) 3.125 MG tablet   Yes No   Sig: Take 3.125 mg by mouth 2 times daily   dapagliflozin (FARXIGA) 10 MG TABS tablet 8/30/2023 at 0830  Yes Yes   Sig: Take 10 mg by mouth every morning   folic acid (FOLVITE) 1 MG tablet 8/30/2023 at 0830  Yes Yes   Sig: Take 1 mg by mouth daily   insulin aspart (NOVOLOG FLEXPEN) 100 UNIT/ML pen 8/30/2023 at 1800  Yes Yes   Sig: Inject 1-10 Units Subcutaneous 4 times daily (with meals and nightly) SLIDING SCALE   insulin detemir (LEVEMIR PEN) 100 UNIT/ML pen 8/30/2023 at 2200  Yes Yes   Sig: Inject 30 Units Subcutaneous At Bedtime   metolazone (ZAROXOLYN) 2.5 MG tablet More than a  month  Yes Yes   Sig: Take 2.5 mg by mouth daily as needed   potassium chloride ER (KLOR-CON M) 20 MEQ CR tablet 8/30/2023 at 0830  Yes Yes   Sig: Take 80 mEq by mouth 4 times daily   sacubitril-valsartan (ENTRESTO)  MG per tablet 8/30/2023 at 2200  Yes Yes   Sig: Take 1 tablet by mouth 2 times daily   spironolactone (ALDACTONE) 25 MG tablet 8/30/2023 at 0830  Yes Yes   Sig: Take 25 mg by mouth every morning   torsemide (DEMADEX) 20 MG tablet 8/30/2023 at 2200  Yes Yes   Sig: Take 100 mg by mouth 2 times daily      Facility-Administered Medications: None     Allergies   Allergies   Allergen Reactions    Ibuprofen Hives    Lisinopril Cough     Social History   , 4 children  No cigarette smoking, alcohol use, or other substance use     Family History   Maternal and paternal family history of CHF, MI, unspecified cardiomyopathy, SCD     Review of Systems   The 10 point Review of Systems is negative other than noted in the HPI or here    Physical Exam   Temp: 98.5  F (36.9  C) Temp src: Oral BP: (!) 127/95 Pulse: 70   Resp: 16 SpO2: 99 % O2 Device: None (Room air)    Vital Signs with Ranges  Temp:  [98.5  F (36.9  C)] 98.5  F (36.9  C)  Pulse:  [70] 70  Resp:  [16-20] 16  BP: (127)/(95) 127/95  SpO2:  [99 %] 99 %  172 lbs 0 oz    General: laying in bed, appears comfortable   Head: normocephalic, atraumatic  HEENT: PERRLA, EOMI, MMM  Heart: normal rate, regular rhythm, no murmur appreciated, no JVP  Lungs: CTAB   Abdomen: soft, non-tender, ascites   Extremities: No peripheral edema  Skin: no rashes visualized     Data     RHC (08/31/2023)    Hemodynamics  RA 9  PA 41/22/30   PCWP 19   Amina CO/CI 3.04/1.61  PVR 3.61 ALONSO     Conclusion   -Right sided filling pressures are normal  -Left sided filling pressures are mildly elevated.  -Mild elevated pulmonary hypertension.  -Reduced cardiac output level.    CMR (08/31/2023)  1. The LV is severely dilated. The global systolic function is severely reduced. The LVEF  is 18%. There is  severe diffuse hypokinesis.   2. The RV is normal in cavity size. The global systolic function is normal. The RVEF is 53%.   3. Both atria are normal in size.  4. There is no significant valvular disease.   5. Late gadolinium enhancement imaging shows no MI, fibrosis or infiltrative disease.   6. There is no pericardial effusion or thickening.  7. There is no intracardiac thrombus.    CONCLUSIONS: Non-ischemic cardiomyopathy. There is significant LV dysfunction with normal RV function in  the absence of fibrosis. This phenotype can be attributed to i) stress cardiomyopathy, ii) toxin-induced  cardiomyopathy, iii) tachycardia-induced cardiomyopathy, or iv) genetic cardiomyopathy. In this clinical  context, this is most consistent with a genetic cardiomyopathy.     Cardiac Device Check (08/31/2023)  Full ICD interrogation performed after MRI complete.  Normal Device Function.   Intrinsic Rhythm: VS 86 bpm  MRI Protection Mode Programmed OFF.  Pacing Programmed From OVO to VVI 40 bpm.  ICD Tachy Therapies Programmed ON  Estimated Battery Longevity to RRT= 12.9 years /Battery voltage = 3.01 V.  Permanent Programming Changes: None    Echocardiogram (08/17/2023)  -Left Ventricle: The left ventricle is severely dilated. Left   ventricular diastolic internal dimension is 6.7 cm. Wall thickness is   normal. Severely reduced left ventricular systolic function. The EF is   visually estimated to be 20-25%. Severe global hypokinesis of the left   ventricular wall segments. Left ventricular filling pressure is elevated.   -Right Ventricle: The right ventricle appears normal in size. Systolic   function is normal. The tricuspid jet envelope definition is inadequate   for estimation of RV systolic pressure.   -Left Atrium: The left atrium is dilated by visual assessment.   -Right Atrium: The right atrium is normal in size.   -Mitral Valve: There is mild regurgitation.   -Tricuspid Valve: There is trace  regurgitation.     [facility] (12/24/2020)  Conclusions:   1. No significant coronary artery disease   2. Elevated LVEDP 24 mmHg.   3. No complications   4. Patient remained stable upon leaving the lab     Recommendations:   1. Medical therapy.   2. Nonischemic cardiomyopathy, likely due to poorly controlled HTN, DM.   Coronary Findings Diagnostic Dominance: Right   Left Main: The overall vessel was visualized by angiography and is angiographically normal.   Left Anterior Descending: The overall vessel was visualized by angiography and is medium to large in size. The overall vessel exhibits minimal luminal irregularities.   Left Circumflex: The overall vessel was visualized by angiography, is medium to large in size and is angiographically normal.   Right Coronary Artery: The overall vessel was visualized by angiography, is medium to large in size and is angiographically normal.

## 2023-08-31 NOTE — PROGRESS NOTES
Pt on 2A per litter with RN post right heart cath, pt awake and alert, denies pain. Site at right neck is flat, dry and intact covered in guaze dressing. DR Saucedo at bedside to speak with family; pt is to be admitted to  with tele monitoring. Family at bedside; family updated. Pt taking clears without problem. PPM notified, awaiting orders.

## 2023-08-31 NOTE — TELEPHONE ENCOUNTER
SOT HEART INTAKE    August 31, 2023    [facility]    Referring Provider: Naren Castaneda  Primary Care Provider: Ami ORELLANA CNP  Specialist: Cardiology,   Source/Facility:[facility]  Diagnosis: Chronic systolic CHF, HFrEH    Critical History     Smoking/nicotine use history: defer to inpt team  Alcohol use history: defer to inpt team  Drug use history: defer to inpt team  Cancer history: defer to inpt team  BMI: 26.9  Dialysis: no       Comments: Verbal permission given by patient to access medical records outside of [facility]-  inpt    Referral intake process completed.  Patient is aware that after financial approval is received, medical records will be requested.   Patient confirmed for a callback from transplant coordinator (within 1 week)      Confirmed coordinator will discuss evaluation process in more detail at the time of their call.   Patient is aware of the need to arrange age appropriate cancer screening, vaccinations, and dental care.  Reminded patient to complete questionnaire, complete medical records release, and review packet prior to evaluation visit .  Assessed patient for special needs (ie--wheelchair, assistance, guardian, and ):    Patient instructed to call [phone] with questions.

## 2023-08-31 NOTE — PROGRESS NOTES
"Prep and teaching complete for right heart cath; pt awake and alert, denies pain. Has ride post procedure, family on site. Consent current; awaiting lab results. Dr Ruiz aware of potassium result; he requested cbcp to be sent; added on to \"hold tube\" from the am.     "

## 2023-08-31 NOTE — Clinical Note
Results are called to Dr. Saucedo. At this point, he will plan to see patient prior to discharge, and potentially to admit to telemetry for diuresis/inotropes.  Patient and pre/post procedure area notified.

## 2023-08-31 NOTE — PHARMACY-ADMISSION MEDICATION HISTORY
Pharmacist Admission Medication History    Admission medication history is complete. The information provided in this note is only as accurate as the sources available at the time of the update.    Medication reconciliation/reorder completed by provider prior to medication history? No    Information Source(s): Patient and Prescription bottles via in-person    Pertinent Information: None    Changes made to PTA medication list:  Added: Benzonatate, Cholecalciferol, Zoloft, Digoxin, Nitroglycerin  Deleted: None  Changed: [place] Chloride 80 mEq po QID --> 60 mEq po TID, Spironolactone 25 mg  QAM --> 50 mg QAM    Medication Affordability:       Allergies reviewed with patient and updates made in EHR: no    Medication History Completed By: Linda Jackson RPH 8/31/2023 5:52 PM    Prior to Admission medications    Medication Sig Last Dose Taking? Auth Provider Long Term End Date   atorvastatin (LIPITOR) 20 MG tablet Take 20 mg by mouth At Bedtime 8/30/2023 at 2200 Yes Reported, Patient Yes    benzonatate (TESSALON) 200 MG capsule Take 200 mg by mouth 3 times daily as needed for cough  Yes Unknown, Entered By History     dapagliflozin (FARXIGA) 10 MG TABS tablet Take 10 mg by mouth every morning 8/30/2023 at 0830 Yes Reported, Patient  11/28/23   digoxin (LANOXIN) 125 MCG tablet Take 250 mcg by mouth daily  Yes Unknown, Entered By History Yes    folic acid (FOLVITE) 1 MG tablet Take 1 mg by mouth daily 8/30/2023 at 0830 Yes Reported, Patient     insulin aspart (NOVOLOG FLEXPEN) 100 UNIT/ML pen Inject 1-10 Units Subcutaneous 4 times daily (with meals and nightly) SLIDING SCALE 8/30/2023 at 1800 Yes Reported, Patient     insulin detemir (LEVEMIR PEN) 100 UNIT/ML pen Inject 30 Units Subcutaneous At Bedtime 8/30/2023 at 2200 Yes Reported, Patient     metolazone (ZAROXOLYN) 2.5 MG tablet Take 2.5 mg by mouth daily as needed More than a month Yes Reported, Patient Yes    nitroGLYcerin (NITROSTAT) 0.4 MG sublingual tablet Place  0.4 mg under the tongue every 5 minutes as needed for chest pain For chest pain place 1 tablet under the tongue every 5 minutes for 3 doses. If symptoms persist 5 minutes after 1st dose call 911.  Yes Unknown, Entered By History     potassium chloride ER (KLOR-CON M) 20 MEQ CR tablet Take 60 mEq by mouth 3 times daily 8/30/2023 at 0830 Yes Reported, Patient     sacubitril-valsartan (ENTRESTO)  MG per tablet Take 1 tablet by mouth 2 times daily 8/30/2023 at 2200 Yes Reported, Patient     sertraline (ZOLOFT) 100 MG tablet Take 100 mg by mouth daily  Yes Unknown, Entered By History     spironolactone (ALDACTONE) 25 MG tablet Take 50 mg by mouth every morning 8/30/2023 at 0830 Yes Reported, Patient Yes    torsemide (DEMADEX) 20 MG tablet Take 100 mg by mouth 2 times daily 8/30/2023 at 2200 Yes Reported, Patient Yes    Vitamin D, Cholecalciferol, 10 MCG (400 UNIT) TABS Take 400 Units by mouth daily  Yes Unknown, Entered By History     carvedilol (COREG) 3.125 MG tablet Take 3.125 mg by mouth 2 times daily   Reported, Patient Yes 6/20/23

## 2023-09-01 ENCOUNTER — APPOINTMENT (OUTPATIENT)
Dept: GENERAL RADIOLOGY | Facility: CLINIC | Age: 48
End: 2023-09-01
Payer: MEDICAID

## 2023-09-01 ENCOUNTER — APPOINTMENT (OUTPATIENT)
Dept: CARDIOLOGY | Facility: CLINIC | Age: 48
End: 2023-09-01
Attending: STUDENT IN AN ORGANIZED HEALTH CARE EDUCATION/TRAINING PROGRAM
Payer: MEDICAID

## 2023-09-01 ENCOUNTER — APPOINTMENT (OUTPATIENT)
Dept: CT IMAGING | Facility: CLINIC | Age: 48
End: 2023-09-01
Payer: MEDICAID

## 2023-09-01 ENCOUNTER — APPOINTMENT (OUTPATIENT)
Dept: ULTRASOUND IMAGING | Facility: CLINIC | Age: 48
End: 2023-09-01
Payer: MEDICAID

## 2023-09-01 LAB
ALBUMIN SERPL BCG-MCNC: 4.3 G/DL (ref 3.5–5.2)
ALBUMIN UR-MCNC: NEGATIVE MG/DL
ALP SERPL-CCNC: 94 U/L (ref 35–104)
ALT SERPL W P-5'-P-CCNC: 7 U/L (ref 0–50)
ANION GAP SERPL CALCULATED.3IONS-SCNC: 14 MMOL/L (ref 7–15)
APPEARANCE UR: ABNORMAL
AST SERPL W P-5'-P-CCNC: 17 U/L (ref 0–45)
ATRIAL RATE - MUSE: 72 BPM
ATRIAL RATE - MUSE: 81 BPM
BILIRUB SERPL-MCNC: 1.2 MG/DL
BILIRUB UR QL STRIP: NEGATIVE
BUN SERPL-MCNC: 27.2 MG/DL (ref 6–20)
CALCIUM SERPL-MCNC: 9.5 MG/DL (ref 8.6–10)
CHLORIDE SERPL-SCNC: 100 MMOL/L (ref 98–107)
COLOR UR AUTO: ABNORMAL
CREAT SERPL-MCNC: 1.22 MG/DL (ref 0.51–0.95)
CYSTATIN C (ROCHE): 1.6 MG/L (ref 0.6–1)
DEPRECATED HCO3 PLAS-SCNC: 22 MMOL/L (ref 22–29)
DIASTOLIC BLOOD PRESSURE - MUSE: NORMAL MMHG
DIASTOLIC BLOOD PRESSURE - MUSE: NORMAL MMHG
GFR SERPL CREATININE-BSD FRML MDRD: 41 ML/MIN/1.73M2
GFR SERPL CREATININE-BSD FRML MDRD: 55 ML/MIN/1.73M2
GLUCOSE BLDC GLUCOMTR-MCNC: 150 MG/DL (ref 70–99)
GLUCOSE BLDC GLUCOMTR-MCNC: 166 MG/DL (ref 70–99)
GLUCOSE BLDC GLUCOMTR-MCNC: 295 MG/DL (ref 70–99)
GLUCOSE SERPL-MCNC: 233 MG/DL (ref 70–99)
GLUCOSE UR STRIP-MCNC: NEGATIVE MG/DL
GRANULAR CAST: 1 /LPF
HGB UR QL STRIP: NEGATIVE
HOLD SPECIMEN: NORMAL
HYALINE CASTS: 7 /LPF
INTERPRETATION ECG - MUSE: NORMAL
INTERPRETATION ECG - MUSE: NORMAL
KETONES UR STRIP-MCNC: NEGATIVE MG/DL
LACTATE SERPL-SCNC: 1.4 MMOL/L (ref 0.7–2)
LEUKOCYTE ESTERASE UR QL STRIP: ABNORMAL
MAGNESIUM SERPL-MCNC: 1.9 MG/DL (ref 1.7–2.3)
MDC_IDC_LEAD_IMPLANT_DT: NORMAL
MDC_IDC_LEAD_IMPLANT_DT: NORMAL
MDC_IDC_LEAD_LOCATION: NORMAL
MDC_IDC_LEAD_LOCATION: NORMAL
MDC_IDC_LEAD_LOCATION_DETAIL_1: NORMAL
MDC_IDC_LEAD_LOCATION_DETAIL_1: NORMAL
MDC_IDC_LEAD_MFG: NORMAL
MDC_IDC_LEAD_MFG: NORMAL
MDC_IDC_LEAD_MODEL: NORMAL
MDC_IDC_LEAD_MODEL: NORMAL
MDC_IDC_LEAD_POLARITY_TYPE: NORMAL
MDC_IDC_LEAD_POLARITY_TYPE: NORMAL
MDC_IDC_LEAD_SERIAL: NORMAL
MDC_IDC_LEAD_SERIAL: NORMAL
MDC_IDC_LEAD_SPECIAL_FUNCTION: NORMAL
MDC_IDC_LEAD_SPECIAL_FUNCTION: NORMAL
MDC_IDC_MSMT_BATTERY_DTM: NORMAL
MDC_IDC_MSMT_BATTERY_DTM: NORMAL
MDC_IDC_MSMT_BATTERY_REMAINING_LONGEVITY: 154 MO
MDC_IDC_MSMT_BATTERY_REMAINING_LONGEVITY: 154 MO
MDC_IDC_MSMT_BATTERY_RRT_TRIGGER: NORMAL
MDC_IDC_MSMT_BATTERY_RRT_TRIGGER: NORMAL
MDC_IDC_MSMT_BATTERY_STATUS: NORMAL
MDC_IDC_MSMT_BATTERY_STATUS: NORMAL
MDC_IDC_MSMT_BATTERY_VOLTAGE: 3.01 V
MDC_IDC_MSMT_BATTERY_VOLTAGE: 3.01 V
MDC_IDC_MSMT_CAP_CHARGE_DTM: NORMAL
MDC_IDC_MSMT_CAP_CHARGE_DTM: NORMAL
MDC_IDC_MSMT_CAP_CHARGE_ENERGY: 18 J
MDC_IDC_MSMT_CAP_CHARGE_ENERGY: 18 J
MDC_IDC_MSMT_CAP_CHARGE_TIME: 4.1 S
MDC_IDC_MSMT_CAP_CHARGE_TIME: 4.1 S
MDC_IDC_MSMT_CAP_CHARGE_TYPE: NORMAL
MDC_IDC_MSMT_CAP_CHARGE_TYPE: NORMAL
MDC_IDC_MSMT_LEADCHNL_RV_IMPEDANCE_VALUE: 551 OHM
MDC_IDC_MSMT_LEADCHNL_RV_IMPEDANCE_VALUE: 551 OHM
MDC_IDC_MSMT_LEADCHNL_RV_PACING_THRESHOLD_AMPLITUDE: 0.75 V
MDC_IDC_MSMT_LEADCHNL_RV_PACING_THRESHOLD_AMPLITUDE: 1 V
MDC_IDC_MSMT_LEADCHNL_RV_PACING_THRESHOLD_PULSEWIDTH: 0.4 MS
MDC_IDC_MSMT_LEADCHNL_RV_PACING_THRESHOLD_PULSEWIDTH: 0.4 MS
MDC_IDC_MSMT_LEADCHNL_RV_SENSING_INTR_AMPL: 20 MV
MDC_IDC_MSMT_LEADCHNL_RV_SENSING_INTR_AMPL: 20 MV
MDC_IDC_PG_IMPLANT_DTM: NORMAL
MDC_IDC_PG_IMPLANT_DTM: NORMAL
MDC_IDC_PG_MFG: NORMAL
MDC_IDC_PG_MFG: NORMAL
MDC_IDC_PG_MODEL: NORMAL
MDC_IDC_PG_MODEL: NORMAL
MDC_IDC_PG_SERIAL: NORMAL
MDC_IDC_PG_SERIAL: NORMAL
MDC_IDC_PG_TYPE: NORMAL
MDC_IDC_PG_TYPE: NORMAL
MDC_IDC_SESS_CLINIC_NAME: NORMAL
MDC_IDC_SESS_CLINIC_NAME: NORMAL
MDC_IDC_SESS_DTM: NORMAL
MDC_IDC_SESS_DTM: NORMAL
MDC_IDC_SESS_TYPE: NORMAL
MDC_IDC_SESS_TYPE: NORMAL
MDC_IDC_SET_BRADY_HYSTRATE: NORMAL
MDC_IDC_SET_BRADY_HYSTRATE: NORMAL
MDC_IDC_SET_BRADY_LOWRATE: 40 {BEATS}/MIN
MDC_IDC_SET_BRADY_LOWRATE: 40 {BEATS}/MIN
MDC_IDC_SET_BRADY_MODE: NORMAL
MDC_IDC_SET_BRADY_MODE: NORMAL
MDC_IDC_SET_LEADCHNL_RV_PACING_AMPLITUDE: 2 V
MDC_IDC_SET_LEADCHNL_RV_PACING_AMPLITUDE: 2 V
MDC_IDC_SET_LEADCHNL_RV_PACING_ANODE_ELECTRODE_1: NORMAL
MDC_IDC_SET_LEADCHNL_RV_PACING_ANODE_ELECTRODE_1: NORMAL
MDC_IDC_SET_LEADCHNL_RV_PACING_ANODE_LOCATION_1: NORMAL
MDC_IDC_SET_LEADCHNL_RV_PACING_ANODE_LOCATION_1: NORMAL
MDC_IDC_SET_LEADCHNL_RV_PACING_CAPTURE_MODE: NORMAL
MDC_IDC_SET_LEADCHNL_RV_PACING_CAPTURE_MODE: NORMAL
MDC_IDC_SET_LEADCHNL_RV_PACING_CATHODE_ELECTRODE_1: NORMAL
MDC_IDC_SET_LEADCHNL_RV_PACING_CATHODE_ELECTRODE_1: NORMAL
MDC_IDC_SET_LEADCHNL_RV_PACING_CATHODE_LOCATION_1: NORMAL
MDC_IDC_SET_LEADCHNL_RV_PACING_CATHODE_LOCATION_1: NORMAL
MDC_IDC_SET_LEADCHNL_RV_PACING_POLARITY: NORMAL
MDC_IDC_SET_LEADCHNL_RV_PACING_POLARITY: NORMAL
MDC_IDC_SET_LEADCHNL_RV_PACING_PULSEWIDTH: 0.4 MS
MDC_IDC_SET_LEADCHNL_RV_PACING_PULSEWIDTH: 0.4 MS
MDC_IDC_SET_LEADCHNL_RV_SENSING_ANODE_ELECTRODE_1: NORMAL
MDC_IDC_SET_LEADCHNL_RV_SENSING_ANODE_ELECTRODE_1: NORMAL
MDC_IDC_SET_LEADCHNL_RV_SENSING_ANODE_LOCATION_1: NORMAL
MDC_IDC_SET_LEADCHNL_RV_SENSING_ANODE_LOCATION_1: NORMAL
MDC_IDC_SET_LEADCHNL_RV_SENSING_CATHODE_ELECTRODE_1: NORMAL
MDC_IDC_SET_LEADCHNL_RV_SENSING_CATHODE_ELECTRODE_1: NORMAL
MDC_IDC_SET_LEADCHNL_RV_SENSING_CATHODE_LOCATION_1: NORMAL
MDC_IDC_SET_LEADCHNL_RV_SENSING_CATHODE_LOCATION_1: NORMAL
MDC_IDC_SET_LEADCHNL_RV_SENSING_POLARITY: NORMAL
MDC_IDC_SET_LEADCHNL_RV_SENSING_POLARITY: NORMAL
MDC_IDC_SET_LEADCHNL_RV_SENSING_SENSITIVITY: 0.3 MV
MDC_IDC_SET_LEADCHNL_RV_SENSING_SENSITIVITY: 0.3 MV
MDC_IDC_SET_ZONE_DETECTION_BEATS_DENOMINATOR: 40 {BEATS}
MDC_IDC_SET_ZONE_DETECTION_BEATS_DENOMINATOR: 40 {BEATS}
MDC_IDC_SET_ZONE_DETECTION_BEATS_NUMERATOR: 30 {BEATS}
MDC_IDC_SET_ZONE_DETECTION_BEATS_NUMERATOR: 30 {BEATS}
MDC_IDC_SET_ZONE_DETECTION_INTERVAL: 300 MS
MDC_IDC_SET_ZONE_DETECTION_INTERVAL: 300 MS
MDC_IDC_SET_ZONE_DETECTION_INTERVAL: 350 MS
MDC_IDC_SET_ZONE_DETECTION_INTERVAL: 350 MS
MDC_IDC_SET_ZONE_DETECTION_INTERVAL: 360 MS
MDC_IDC_SET_ZONE_DETECTION_INTERVAL: 360 MS
MDC_IDC_SET_ZONE_TYPE: NORMAL
MDC_IDC_STAT_AT_BURDEN_PERCENT: 0 %
MDC_IDC_STAT_AT_BURDEN_PERCENT: 0 %
MDC_IDC_STAT_AT_DTM_END: NORMAL
MDC_IDC_STAT_AT_DTM_END: NORMAL
MDC_IDC_STAT_AT_DTM_START: NORMAL
MDC_IDC_STAT_AT_DTM_START: NORMAL
MDC_IDC_STAT_BRADY_DTM_END: NORMAL
MDC_IDC_STAT_BRADY_DTM_END: NORMAL
MDC_IDC_STAT_BRADY_DTM_START: NORMAL
MDC_IDC_STAT_BRADY_DTM_START: NORMAL
MDC_IDC_STAT_BRADY_RA_PERCENT_PACED: NORMAL
MDC_IDC_STAT_BRADY_RA_PERCENT_PACED: NORMAL
MDC_IDC_STAT_BRADY_RV_PERCENT_PACED: 0 %
MDC_IDC_STAT_BRADY_RV_PERCENT_PACED: 0 %
MDC_IDC_STAT_CRT_DTM_END: NORMAL
MDC_IDC_STAT_CRT_DTM_END: NORMAL
MDC_IDC_STAT_CRT_DTM_START: NORMAL
MDC_IDC_STAT_CRT_DTM_START: NORMAL
MDC_IDC_STAT_EPISODE_RECENT_COUNT: 0
MDC_IDC_STAT_EPISODE_RECENT_COUNT_DTM_END: NORMAL
MDC_IDC_STAT_EPISODE_RECENT_COUNT_DTM_START: NORMAL
MDC_IDC_STAT_EPISODE_TOTAL_COUNT: 0
MDC_IDC_STAT_EPISODE_TOTAL_COUNT: 3
MDC_IDC_STAT_EPISODE_TOTAL_COUNT: 3
MDC_IDC_STAT_EPISODE_TOTAL_COUNT_DTM_END: NORMAL
MDC_IDC_STAT_EPISODE_TOTAL_COUNT_DTM_START: NORMAL
MDC_IDC_STAT_EPISODE_TYPE: NORMAL
MDC_IDC_STAT_TACHYTHERAPY_ATP_DELIVERED_RECENT: 0
MDC_IDC_STAT_TACHYTHERAPY_ATP_DELIVERED_RECENT: 0
MDC_IDC_STAT_TACHYTHERAPY_ATP_DELIVERED_TOTAL: 0
MDC_IDC_STAT_TACHYTHERAPY_ATP_DELIVERED_TOTAL: 0
MDC_IDC_STAT_TACHYTHERAPY_RECENT_DTM_END: NORMAL
MDC_IDC_STAT_TACHYTHERAPY_RECENT_DTM_END: NORMAL
MDC_IDC_STAT_TACHYTHERAPY_RECENT_DTM_START: NORMAL
MDC_IDC_STAT_TACHYTHERAPY_RECENT_DTM_START: NORMAL
MDC_IDC_STAT_TACHYTHERAPY_SHOCKS_ABORTED_RECENT: 0
MDC_IDC_STAT_TACHYTHERAPY_SHOCKS_ABORTED_RECENT: 0
MDC_IDC_STAT_TACHYTHERAPY_SHOCKS_ABORTED_TOTAL: 0
MDC_IDC_STAT_TACHYTHERAPY_SHOCKS_ABORTED_TOTAL: 0
MDC_IDC_STAT_TACHYTHERAPY_SHOCKS_DELIVERED_RECENT: 0
MDC_IDC_STAT_TACHYTHERAPY_SHOCKS_DELIVERED_RECENT: 0
MDC_IDC_STAT_TACHYTHERAPY_SHOCKS_DELIVERED_TOTAL: 0
MDC_IDC_STAT_TACHYTHERAPY_SHOCKS_DELIVERED_TOTAL: 0
MDC_IDC_STAT_TACHYTHERAPY_TOTAL_DTM_END: NORMAL
MDC_IDC_STAT_TACHYTHERAPY_TOTAL_DTM_END: NORMAL
MDC_IDC_STAT_TACHYTHERAPY_TOTAL_DTM_START: NORMAL
MDC_IDC_STAT_TACHYTHERAPY_TOTAL_DTM_START: NORMAL
MUCOUS THREADS #/AREA URNS LPF: PRESENT /LPF
NITRATE UR QL: NEGATIVE
P AXIS - MUSE: 20 DEGREES
P AXIS - MUSE: 47 DEGREES
PH UR STRIP: 5 [PH] (ref 5–7)
POTASSIUM SERPL-SCNC: 3.3 MMOL/L (ref 3.4–5.3)
POTASSIUM SERPL-SCNC: 3.3 MMOL/L (ref 3.4–5.3)
PR INTERVAL - MUSE: 190 MS
PR INTERVAL - MUSE: 192 MS
PROT SERPL-MCNC: 8.2 G/DL (ref 6.4–8.3)
QRS DURATION - MUSE: 94 MS
QRS DURATION - MUSE: 94 MS
QT - MUSE: 444 MS
QT - MUSE: 454 MS
QTC - MUSE: 497 MS
QTC - MUSE: 515 MS
R AXIS - MUSE: -47 DEGREES
R AXIS - MUSE: -48 DEGREES
RBC URINE: 1 /HPF
SODIUM SERPL-SCNC: 136 MMOL/L (ref 136–145)
SP GR UR STRIP: 1.01 (ref 1–1.03)
SQUAMOUS EPITHELIAL: 6 /HPF
SYSTOLIC BLOOD PRESSURE - MUSE: NORMAL MMHG
SYSTOLIC BLOOD PRESSURE - MUSE: NORMAL MMHG
T AXIS - MUSE: 53 DEGREES
T AXIS - MUSE: 75 DEGREES
TRANSITIONAL EPI: <1 /HPF
UROBILINOGEN UR STRIP-MCNC: NORMAL MG/DL
VENTRICULAR RATE- MUSE: 72 BPM
VENTRICULAR RATE- MUSE: 81 BPM
WBC URINE: 5 /HPF

## 2023-09-01 PROCEDURE — 94621 CARDIOPULM EXERCISE TESTING: CPT | Mod: 26 | Performed by: INTERNAL MEDICINE

## 2023-09-01 PROCEDURE — 83605 ASSAY OF LACTIC ACID: CPT | Performed by: INTERNAL MEDICINE

## 2023-09-01 PROCEDURE — 83735 ASSAY OF MAGNESIUM: CPT | Performed by: INTERNAL MEDICINE

## 2023-09-01 PROCEDURE — 94621 CARDIOPULM EXERCISE TESTING: CPT

## 2023-09-01 PROCEDURE — 93922 UPR/L XTREMITY ART 2 LEVELS: CPT | Mod: 26 | Performed by: RADIOLOGY

## 2023-09-01 PROCEDURE — 99222 1ST HOSP IP/OBS MODERATE 55: CPT | Mod: FS | Performed by: PHYSICIAN ASSISTANT

## 2023-09-01 PROCEDURE — 36415 COLL VENOUS BLD VENIPUNCTURE: CPT | Performed by: INTERNAL MEDICINE

## 2023-09-01 PROCEDURE — 999N000111 HC STATISTIC OT IP EVAL DEFER

## 2023-09-01 PROCEDURE — 93922 UPR/L XTREMITY ART 2 LEVELS: CPT

## 2023-09-01 PROCEDURE — 80053 COMPREHEN METABOLIC PANEL: CPT | Performed by: INTERNAL MEDICINE

## 2023-09-01 PROCEDURE — 84132 ASSAY OF SERUM POTASSIUM: CPT | Performed by: INTERNAL MEDICINE

## 2023-09-01 PROCEDURE — 250N000013 HC RX MED GY IP 250 OP 250 PS 637: Performed by: INTERNAL MEDICINE

## 2023-09-01 PROCEDURE — 120N000003 HC R&B IMCU UMMC

## 2023-09-01 PROCEDURE — 99233 SBSQ HOSP IP/OBS HIGH 50: CPT | Mod: 25 | Performed by: INTERNAL MEDICINE

## 2023-09-01 PROCEDURE — 81001 URINALYSIS AUTO W/SCOPE: CPT | Performed by: INTERNAL MEDICINE

## 2023-09-01 PROCEDURE — 70450 CT HEAD/BRAIN W/O DYE: CPT

## 2023-09-01 PROCEDURE — 82610 CYSTATIN C: CPT | Performed by: INTERNAL MEDICINE

## 2023-09-01 PROCEDURE — 71250 CT THORAX DX C-: CPT | Mod: 26 | Performed by: STUDENT IN AN ORGANIZED HEALTH CARE EDUCATION/TRAINING PROGRAM

## 2023-09-01 PROCEDURE — 93880 EXTRACRANIAL BILAT STUDY: CPT

## 2023-09-01 PROCEDURE — 70450 CT HEAD/BRAIN W/O DYE: CPT | Mod: 26 | Performed by: STUDENT IN AN ORGANIZED HEALTH CARE EDUCATION/TRAINING PROGRAM

## 2023-09-01 PROCEDURE — 71250 CT THORAX DX C-: CPT

## 2023-09-01 PROCEDURE — 93880 EXTRACRANIAL BILAT STUDY: CPT | Mod: 26 | Performed by: RADIOLOGY

## 2023-09-01 PROCEDURE — 74176 CT ABD & PELVIS W/O CONTRAST: CPT | Mod: 26 | Performed by: STUDENT IN AN ORGANIZED HEALTH CARE EDUCATION/TRAINING PROGRAM

## 2023-09-01 PROCEDURE — 80323 ALKALOIDS NOS: CPT | Performed by: INTERNAL MEDICINE

## 2023-09-01 PROCEDURE — 71046 X-RAY EXAM CHEST 2 VIEWS: CPT

## 2023-09-01 PROCEDURE — 71046 X-RAY EXAM CHEST 2 VIEWS: CPT | Mod: 26 | Performed by: RADIOLOGY

## 2023-09-01 RX ORDER — POTASSIUM CHLORIDE 750 MG/1
40 TABLET, EXTENDED RELEASE ORAL ONCE
Status: COMPLETED | OUTPATIENT
Start: 2023-09-01 | End: 2023-09-01

## 2023-09-01 RX ORDER — MAGNESIUM OXIDE 400 MG/1
400 TABLET ORAL EVERY 4 HOURS
Status: COMPLETED | OUTPATIENT
Start: 2023-09-01 | End: 2023-09-01

## 2023-09-01 RX ADMIN — POTASSIUM CHLORIDE 60 MEQ: 1500 TABLET, EXTENDED RELEASE ORAL at 15:30

## 2023-09-01 RX ADMIN — HYDRALAZINE HYDROCHLORIDE 25 MG: 25 TABLET, FILM COATED ORAL at 11:05

## 2023-09-01 RX ADMIN — INSULIN ASPART 1 UNITS: 100 INJECTION, SOLUTION INTRAVENOUS; SUBCUTANEOUS at 18:40

## 2023-09-01 RX ADMIN — DAPAGLIFLOZIN 10 MG: 10 TABLET, FILM COATED ORAL at 11:04

## 2023-09-01 RX ADMIN — HYDRALAZINE HYDROCHLORIDE 25 MG: 25 TABLET, FILM COATED ORAL at 15:30

## 2023-09-01 RX ADMIN — POTASSIUM CHLORIDE 60 MEQ: 1500 TABLET, EXTENDED RELEASE ORAL at 11:05

## 2023-09-01 RX ADMIN — SACUBITRIL AND VALSARTAN 1 TABLET: 97; 103 TABLET, FILM COATED ORAL at 11:05

## 2023-09-01 RX ADMIN — POTASSIUM CHLORIDE 60 MEQ: 1500 TABLET, EXTENDED RELEASE ORAL at 20:29

## 2023-09-01 RX ADMIN — HYDRALAZINE HYDROCHLORIDE 25 MG: 25 TABLET, FILM COATED ORAL at 20:29

## 2023-09-01 RX ADMIN — MAGNESIUM OXIDE TAB 400 MG (241.3 MG ELEMENTAL MG) 400 MG: 400 (241.3 MG) TAB at 15:30

## 2023-09-01 RX ADMIN — ATORVASTATIN CALCIUM 20 MG: 20 TABLET, FILM COATED ORAL at 22:31

## 2023-09-01 RX ADMIN — TORSEMIDE 100 MG: 100 TABLET ORAL at 11:04

## 2023-09-01 RX ADMIN — POTASSIUM CHLORIDE 40 MEQ: 750 TABLET, EXTENDED RELEASE ORAL at 16:55

## 2023-09-01 RX ADMIN — TORSEMIDE 100 MG: 100 TABLET ORAL at 20:29

## 2023-09-01 RX ADMIN — MAGNESIUM OXIDE TAB 400 MG (241.3 MG ELEMENTAL MG) 400 MG: 400 (241.3 MG) TAB at 18:28

## 2023-09-01 RX ADMIN — INSULIN DETEMIR 30 UNITS: 100 INJECTION, SOLUTION SUBCUTANEOUS at 22:31

## 2023-09-01 RX ADMIN — SPIRONOLACTONE 50 MG: 25 TABLET ORAL at 11:04

## 2023-09-01 RX ADMIN — INSULIN ASPART 4 UNITS: 100 INJECTION, SOLUTION INTRAVENOUS; SUBCUTANEOUS at 11:10

## 2023-09-01 RX ADMIN — SERTRALINE HYDROCHLORIDE 100 MG: 50 TABLET ORAL at 15:31

## 2023-09-01 RX ADMIN — SACUBITRIL AND VALSARTAN 1 TABLET: 97; 103 TABLET, FILM COATED ORAL at 20:30

## 2023-09-01 RX ADMIN — POTASSIUM CHLORIDE 40 MEQ: 750 TABLET, EXTENDED RELEASE ORAL at 23:20

## 2023-09-01 ASSESSMENT — ACTIVITIES OF DAILY LIVING (ADL)
ADLS_ACUITY_SCORE: 22
ADLS_ACUITY_SCORE: 23

## 2023-09-01 NOTE — PROGRESS NOTES
[facility]   Cardiology II Service / Advanced Heart Failure  Daily Progress Note  Date of Service: 9/1/2023      Patient: Jamilah Jiménez  MRN: [id number]  Admission Date: 8/31/2023  Hospital Day # 1    Assessment and Plan:  Jamilah Jiménez is a 47-year-old female with HFrEF, Class III, Stage C-D, DM2, HTN, PE admitted following RHC with CI 1.61. Admitted for further evaluation and consideration for advanced heart failure therapies.     09/01:  -Cardiopulmonary Stress Test 09/01: Peak VO2 12.70 ml/kg/min; VE/VCO2 59.11; RER 0.99   - Started advanced therapy evaluation     HFrEF (LVEF 18%), non-ischemic cardiomyopathy   Hypertension   Hyperlipidemia   Patient with progressively worsening dyspnea, edema, overall functional status over the last ~ 2 years. 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 Has been on diuretics, GDMT, has ICD in place.   ACC/AHA Stage D, NYHA Symptom Class III  Primary Cardiologist: Dr. Saucedo Last seen 12/06/2022   Ischemic Eval: Coronary angiogram 12/24/2020 with no significant coronary artery disease, elevated LVEDP 24 mmHg   RHC 08/31/2023: RA 9; PA 41/22/30; Amina CO/CI 3.04/1.61   CMR 08/31/2023: Severely dilated LV with global systolic function severely reduced, LVEF 18%, severe diffuse hypokinesis, RV with normal cavity size and normal global systolic function, RVEF 53%  Cardiopulmonary Stress Test 09/01: Peak VO2 12.70 ml/kg/min; VE/VCO2 59.11; RER 0.99      Plan:   Volume: hypervolemic, ascites   Diuretic: Torsemide 100 mg BID   ACE-I/ARB/ARNi: Entresto  mg BID  BB: Carvedilol 3.125 mg BID (will  hold in setting of low CI)   Aldosterone antagonist: Spironolactone 25 mg; Potassium Chloride 80 mEq TID   SGLT2i: dapagliflozin 10 mg   Hydral/Nitrates: hydralazine 25 TID   SCD prophylaxis ICD 11/2022   Statin : Atorvastatin 20 mg        DATE MAP CVP PAP PCWP Amina CO Amina CI SVR MVo2 Therapies Weight    08/31/2023   9  41/22/30 19 3.04 1.61       172 lbs    10/2022   12 76/35/50 38 3.25 1.72       169 lbs                                                                              Type 2 DM   Most recent Hgb A1c 7.8 on 08/10/2023  - Detemir 30 U at bedtime  - [facility]         Aron Bañuelos MD  Internal Medicine-Pediatrics, PGY-3     ================================================================    Interval History/ROS: Last 24 hr care team notes reviewed. No acute events overnight.     ROS:  4 point ROS including Respiratory, CV, GI and , other than that noted in the HPI, is negative.     Medications: Reviewed in EPIC.     Physical Exam:   Temp:  [97.7  F (36.5  C)-99.1  F (37.3  C)] 98.7  F (37.1  C)  Pulse:  [72-96] 75  Resp:  [18-20] 20  BP: (122-150)/() 126/92  Cuff Mean (mmHg):  [95] 95  SpO2:  [96 %-100 %] 96 %    General: laying in bed, appears comfortable   Head: normocephalic, atraumatic  HEENT: PERRLA, EOMI, MMM  Heart: normal rate, regular rhythm, no murmur appreciated, no JVP  Lungs: CTAB   Abdomen: soft, non-tender, ascites   Extremities: No peripheral edema  Skin: no rashes visualized

## 2023-09-01 NOTE — PLAN OF CARE
No acute changes this shift. Pt resting in bed no s/s of distress. AOx4, calm and cooperative, VSS, denies pain. Up independently. Voids independently without difficulty. Continue POC    Plan for stress test today.  Notify team of concerns

## 2023-09-01 NOTE — PROGRESS NOTES
"CLINICAL NUTRITION SERVICES - ASSESSMENT NOTE    Current BMI: Body mass index is 26.55 kg/m .   BMI is within the recommendation of <35 for potential heart transplant     Nutrition Prescription    Malnutrition Status:    Patient does not meet two of the established criteria necessary for diagnosing malnutrition    Recommendations already ordered by Registered Dietitian (RD):  None today    Future/Additional Recommendations:  Monitor PO intake and follow clinical course     REASON FOR ASSESSMENT  Jamilah Jiménez is a/an 47 year old female assessed by the dietitian for Provider Order - LVAD / Heart transplant     Pt with a history of HFrEF (EF 18%), Class III, Stage C-D, Type 2 Diabetes Mellitus, HTN, acute on chronic systolic heart failure, hx of cardiogenic shock 10/2022, hypokalemia, HLD, depression, anxiety, and hx of PE who was having follow up right heart catheterization completed where a Cardiac Index of 1.61 was noted so she was admitted for further workup and evaluation. She was last seen at [facility] heart failure clinic by Dr. Saucedo on 12/07/2022. Previous to this she was admitted at OSH on 10/2022 following a RHC that showed cardiogenic shock with elevated filling pressures, pulmonary hypertension and low cardiac output. She has also had multiple other ER visits over the last several years. Patient currently admitted under the heart failure service and CVTS was consulted for consideration of heart transplant     NUTRITION HISTORY  Pt reports a good appetite.  Usual dry weight of 160-170 lbs.  Denies receiving prior diet education and is receptive to information provided.      CURRENT NUTRITION ORDERS  Diet: 2 g Sodium  Intake/Tolerance: 100% per flowsheets    LABS  Labs reviewed    MEDICATIONS  Medications reviewed and notable for medium resistance insulin, levemir pen, mag ox tablet, KCL, aldactone, torsemide    ANTHROPOMETRICS  Height: 170.2 cm (5' 7.008\")  Most Recent Weight: 76.9 kg (169 lb 8.5 oz)  "   IBW: 61 kg   BMI: Overweight BMI 25-29.9  Weight History: Pt denies weight changes    Dosing Weight: 77 kg    ASSESSED NUTRITION NEEDS  Estimated Energy Needs: 1925 - 2310 kcals/day (25 - 30 kcals/kg)  Justification: Maintenance  Estimated Protein Needs: 77- 92 - grams protein/day (1.0 - 1.2 grams of pro/kg)  Justification: pending course  Estimated Fluid Needs: 1 mL/kcal  Justification: Per provider pending fluid status    MALNUTRITION  % Intake: No decreased intake noted  % Weight Loss: None noted - fluid fluctuations may mask true weight changes  Subcutaneous Fat Loss: None observed  Muscle Loss: None observed  Fluid Accumulation/Edema: Does not meet criteria  Malnutrition Diagnosis: Patient does not meet two of the established criteria necessary for diagnosing malnutrition    NUTRITION DIAGNOSIS  Predicted inadequate nutrient intake (calories / protein) related to potential for menu fatigue with restricted diet and anticipated prolonged hospitalization    INTERVENTIONS  Implementation  Nutrition Education: Discussed post LVAD / post heart transplant diet guidelines.  Provided Handouts: food safety booklet and your guide to diet after transplant     Goals  Patient to consume % of nutritionally adequate meal trays TID, or the equivalent with supplements/snacks.     Monitoring/Evaluation  Progress toward goals will be monitored and evaluated per protocol  Laura Campos, RD, LD, CNSC  [address] pager: [phone]  Ascom: [phone]  Weekend/Holiday RD pager [phone]

## 2023-09-01 NOTE — PLAN OF CARE
6C-OT: Cancel/Defer. Pt with no acute OT needs at this time. Pt up independently and ambulating. Will complete OT order and defer per chart review and discussion with patient. Pt plans to ambulate daily.                   [address]  Cardiology Progress Note      Emmanuel Danielle  1956, 63 y.o. Reason for referral: CHF   CC: \"I feel good. \"             HPI:   This is a 63 y.o. female with a past medical history significant for chronic respiratory failure, COPD (previous tracheotomy), diastolic HF, CAD, CKD and HTN. Hospitalized 1/29/2017-2/3/2017 with SOB, acute respiratory failure and diastolic HF. She was intubated and diuresed. She was sent to [facility] care facility. She had sleep study and dx with NIMISHA and placed on CPAP. Today, she presents for management of heart failure. She wears oxygen at 2-3 liters and continues to smoke 3 cigarettes daily. Also utilizes inhalers and nebulizer treatments. She denies any weight gain or worsening LE edema. Says she has been taking all medication as prescribed and doesn't monitor her fluid or sodium intake. Uses a walker for assistance. Patient denies exertional chest pain/pressure, dyspnea at rest, CARRILLO, PND, orthopnea, palpitations, lightheadedness, weight changes, changes in LE edema, and syncope. Past Medical History:   Diagnosis Date    Acute kidney failure (HCC)     Arthritis     Asthma     Congestive heart failure (HCC)     COPD (chronic obstructive pulmonary disease) (HCC)     Dependence on supplemental oxygen     GERD (gastroesophageal reflux disease)     Heart disease     Hyperlipidemia     Hypertension     Muscle weakness     Obesity     Schizophrenia ([facility] [address]     Type 2 diabetes mellitus without complication (HCC)       Past Surgical History:   Procedure Laterality Date    ANKLE SURGERY      error    HERNIA REPAIR      HYSTERECTOMY        Family History   Problem Relation Age of Onset    Hypertension Father     Hypertension Brother       Social History     Tobacco Use    Smoking status: Heavy Tobacco Smoker     Packs/day: 1.00     Years: 40.00     Pack years: 40.00    Smokeless tobacco: Never Used    Tobacco comment: 6 cig QD.     Substance Use Topics    Alcohol use: Yes    Drug use: No     Allergies   Allergen Reactions    Pcn [Penicillins] Hives and Rash     Review of Systems -   Constitutional: Negative for weight gain/loss; malaise, fever  Respiratory: Negative for Asthma;  cough and hemoptysis  Cardiovascular: Negative for palpitations,dizziness   Gastrointestinal: Negative for abd.pain; constipation/diarrhea;    Genitourinary: Negative for stones; hematuria; frequency hesitancy  Integumentt: Negative for rash or pruritis  Hematologic/lymphatic: Negative for blood dyscrasia; leukemia/lymphoma  Musculoskeletal: Negative for Connective tissue disease  Neurological:  Negative for Seizure   Behavioral/Psych:Negative for Bipolar disorder, Schizophrenia; Dementia  Endocrine: negative for thyroid, parathyroid disease    Physical Examination:    /76   Pulse 90   Ht 5' 9\" (1.753 m)   Wt 297 lb (134.7 kg)   SpO2 94%   BMI 43.86 kg/m²    HEENT:  Face: Atraumatic, Conjunctiva: Pink; non icteric,  Mucous Memb:  Moist, No thyromegaly or Lymphadenopathy  Respiratory:  Resp Assessment: normal, Resp Auscultation: clear   Cardiovascular: Auscultation: nl S1 & S2, Palpation:  Nl PMI;  No heaves or thrills, JVP:  normal  Abdomen: Soft, non-tender, Normal bowel sounds,  No organomegaly  Extremities: No Cyanosis or Clubbing  Neurological: Oriented to time, place, and person, Non-anxious  Psychiatric: Normal mood and affect  Skin: Warm and dry,  No rash seen     Outpatient Medications Marked as Taking for the 4/26/19 encounter (Office Visit) with Yung Montanez MD   Medication Sig Dispense Refill    lisinopril (PRINIVIL;ZESTRIL) 2.5 MG tablet Take 2.5 mg by mouth daily      busPIRone (BUSPAR) 10 MG tablet Take 1 tablet by mouth 3 times daily 30 tablet 0    torsemide (DEMADEX) 20 MG tablet TAKE 1 TABLET BY MOUTH 2 TIMES DAILY *START THIS WHEN FUROSEMIDE PRESCRIPTION IS COMPLETE* 60 tablet 3    Umeclidinium Bromide (INCRUSE ELLIPTA) 62.5 MCG/INH AEPB Inhale tablet Take 15 mg by mouth daily       ranitidine (ZANTAC) 150 MG capsule Take 150 mg by mouth 2 times daily       Labs:   Lab Results   Component Value Date    HDL 30 01/30/2017    LDLCALC 77 01/30/2017    TRIG 213 01/30/2017     Echo 4/16/19:  Left ventricular cavity size is normal.  Normal left ventricular wall thickness. Left ventricular wall motion is normal with LVEF 55%  The right ventricle has preserved systolic function on 2-D imaging    5/2015 Pharm-Myoview:  Negative for ischemia      ASSESSMENT AND PLAN:      Diastolic congestive heart failure  NYHA class II-III  Appears compensated   Will discontinue ASA and lisinopril  Continue BB, Torsemide and spironolactone. Reinforced low sodium diet and fluid restriction (2,000 mg of sodium and 48 ounces of fluid). Essential Hypertension  Controlled   Continue medical management with BB    NIMISHA (obstructive sleep apnea)  Unable to tolerate CPAP. Continuous O2 at 2-3 liters.      Tobacco use  Smokes 3 cigarettes daily. Trying to quit. Emphasized smoking cessation      Follow up on an as needed basis     Thank you very much for allowing me to participate in the care of your patient. Please do not hesitate to contact me if you have any questions. Sincerely,    Mark Bustamante M.D  [facility], [address] [place] [address]  Ph: [phone]  Fax: [phone]    This note was scribed in the presence of Dr Casimir Klinefelter, MD by Colton Crandall RN. Physician Attestation:  The scribes documentation has been prepared under my direction and personally reviewed by me in its entirety. I confirm that the note above accurately reflects all work, treatment, procedures, and medical decision making performed by me.

## 2023-09-01 NOTE — UTILIZATION REVIEW
Admission Status; Secondary Review Determination         Under the authority of the Utilization Management Committee, the utilization review process indicated a secondary review on the above patient.  The review outcome is based on review of the medical records, discussions with staff, and applying clinical experience noted on the date of the review.        (x)      Inpatient Status Appropriate - This patient's medical care is consistent with medical management for inpatient care and reasonable inpatient medical practice.     RATIONALE FOR DETERMINATION   The patient is a 47-year-old female admitted on 8/31/2023.  Patient previously been seen in the CHF clinic at the [facility] heart failure clinic.  She has nonischemic cardiomyopathy.  She has worsening symptoms with progressive dyspnea on exertion and rest and limited exercise tolerance.  She is stage D with a ICD implanted.  Creatinine is also going up and she had been started on torsemide potent diuretic on admission.  Echocardiogram on this admission shows severe cardiomyopathy with a 18% left ventricular ejection fraction and severe hypokinesis seen.  Given worsening cardiac function, increased overall symptomatology with severe CHF findings, agree with current inpatient status.  She also has diabetes mellitus type 2.  The goal will be to improve cardiac function and stabilize condition which will likely require at least an additional midnight stay.      The severity of illness, intensity of service provided, expected LOS and risk for adverse outcome make the care complex, high risk and appropriate for hospital admission.        The information on this document is developed by the utilization review team in order for the business office to ensure compliance.  This only denotes the appropriateness of proper admission status and does not reflect the quality of care rendered.         The definitions of Inpatient Status and Observation Status used  in making the determination above are those provided in the CMS Coverage Manual, Chapter 1 and Chapter 6, section 70.4.      Sincerely,     Mejia Hagan MD  Physician Advisor  Utilization Review/ Case Management  [facility].

## 2023-09-01 NOTE — PROGRESS NOTES
Neuro: A&Ox4. Able to make needs known.   Cardiac: SR. BP stable. Afebrile.    Respiratory: Satting well on RA. ARNOLD.  GI/: Adequate urine output, ambulating to bathroom. No BM this shift.   Diet/appetite: Tolerating 2g Na diet with 2L FR. Eating well.  Activity:  Up ad van.  Pain: Denies.  Skin: No new deficits noted. Old scar from AICD placement last year.  LDA's: R PIV, saline locked.    Plan: Continue with POC. Notify primary team with changes.    K/Mag replaced per RN managed protocol. Recheck Mag in am, recheck K at 2000.

## 2023-09-01 NOTE — CONSULTS
CARDIOTHORACIC SURGERY CONSULT NOTE  9/1/2023      Reason for Consult: Heart transplant evaluation      ASSESSMENT/PLAN: Patient is a 47 year old female with a history of HFrEF (EF 18%) Class III Stage C-D, DMT2, HTN, acute on chronic systolic heart failure, hx of cardiogenic shock 10/2022, hx of ION during 10/2022 admission, hypokalemia, HLD, depression, anxiety, and PE was admitted after a right heart cath that showed a Cardiac Index of 1.61 for further evaluation and consideration of advanced heart failure therapies. CVTS was consulted as part of the heart transplant evaluation.     - Continue with heart transplant work-up, will discuss case at weekly transplant conference  - Other cares per primary team  - Thank you for the opportunity to participate in the care of this patient.    Patient and plan discussed with attending, Dr. Carrera.      Rosie Hernandez PA-C   Cardiothoracic Surgery  September 1, 2023 1:12 PM   p: [phone]       ________________________________________________________________________________________________    HPI: Patient is a 47 year old female with a history of HFrEF (EF 18%), Class III, Stage C-D, Type 2 Diabetes Mellitus, HTN, acute on chronic systolic heart failure, hx of cardiogenic shock 10/2022, hypokalemia, HLD, depression, anxiety, and hx of PE who was having follow up right heart catheterization completed where a Cardiac Index of 1.61 was noted so she was admitted for further workup and evaluation. She was last seen at [facility] heart failure clinic by Dr. Saucedo on 12/07/2022. Previous to this she was admitted at OSH on 10/2022 following a RHC that showed cardiogenic shock with elevated filling pressures, pulmonary hypertension and low cardiac output. She has also had multiple other ER visits over the last several years. Patient currently admitted under the heart failure service and CVTS was consulted for consideration of heart transplant.    Patient recently called in to her  Cardiologist on 08/10/2023 with symptoms of bloating/fluid retention, SOB, and she reported she could not lay flat due to the SOB as well.  Patient reports today she still is having SOB and cannot lay down flat. Has been having to sleep in her recliner at home for the last 3-4 weeks and at times needs to sleep sitting straight up. Most of her fluid tends to concentrate in the abdomen and will also cause her appetite to decrease as well, however her weight has increased since early August. No active CP.     Of note from Dr. Saucedo visit:   Patient previously followed with [facility].  Had C in 2020 with no significant CAD reportedly.  Presented to Dr. Allen's clinic 6/1/22 and found to be in decompensated HF.  Was admitted for IV diuretics.  LVEF at that time reported 20-25%.  Transferred to [facility].  Noted to have ION and hypotension.  Was given IVF resuscitation, ION and hypotension felt to be 2/2 overdiuresis.  Limited TTE while hospitalized with LVEF 35-40% Reintroduced HF GDMT at reduced doses while hospitalized and discharged off diuretics but had diuretics uptitrated as outpatient.  Was admitted 7/15 with ION and hypokalemia.  Potassium was replaced and ION resolved.  Of note PCWP was 38 when weight was 169 lbs. She was diuresed with symptom improvement (less orthopnea and chest heaviness) however weight remained 168-170 lbs. Neg 9.8 L however I/O off with no intake documented at times. Had mild ION with aggressive diuresing however when slowed down, renal function improved. Entresto titrated up to high dose. With her ION, spironolactone was stopped but will reintroduce at discharge at lower dose lawanda to help her hypokalemia.      PMH:  Past Medical History:   Diagnosis Date    Anxiety     Diabetes (H)     TYPE II    Hypertension        PSH:  Past Surgical History:   Procedure Laterality Date    APPENDECTOMY      INCIDENTAL WITH C SECTION    CV CARDIAC CATHERIZATION      CV RIGHT HEART CATH  MEASUREMENTS RECORDED N/A 8/31/2023    Procedure: Heart Cath Right Heart Cath;  Surgeon: Alexander Stevens MD;  Location:  HEART CARDIAC CATH LAB    EP ICD      EYE SURGERY Left     DETACHED RETINA    GYN SURGERY      TUBAL LIGATION    ORTHOPEDIC SURGERY      SHOULDER SURGERY    ORTHOPEDIC SURGERY      KNEE ARTHROSCOPY       FH:  History reviewed. No pertinent family history.    SH:  Social History     Socioeconomic History    Marital status: Single     Spouse name: None    Number of children: None    Years of education: None    Highest education level: None   Tobacco Use    Smoking status: Never    Smokeless tobacco: Never   Vaping Use    Vaping Use: Never used   Substance and Sexual Activity    Alcohol use: Not Currently    Drug use: Not Currently       Home Meds:  Medications Prior to Admission   Medication Sig Dispense Refill Last Dose    atorvastatin (LIPITOR) 20 MG tablet Take 20 mg by mouth At Bedtime   8/30/2023 at 2200    benzonatate (TESSALON) 200 MG capsule Take 200 mg by mouth 3 times daily as needed for cough       dapagliflozin (FARXIGA) 10 MG TABS tablet Take 10 mg by mouth every morning   8/30/2023 at 0830    digoxin (LANOXIN) 125 MCG tablet Take 250 mcg by mouth daily       folic acid (FOLVITE) 1 MG tablet Take 1 mg by mouth daily   8/30/2023 at 0830    insulin aspart (NOVOLOG FLEXPEN) 100 UNIT/ML pen Inject 1-10 Units Subcutaneous 4 times daily (with meals and nightly) SLIDING SCALE   8/30/2023 at 1800    insulin detemir (LEVEMIR PEN) 100 UNIT/ML pen Inject 30 Units Subcutaneous At Bedtime   8/30/2023 at 2200    metolazone (ZAROXOLYN) 2.5 MG tablet Take 2.5 mg by mouth daily as needed   More than a month    nitroGLYcerin (NITROSTAT) 0.4 MG sublingual tablet Place 0.4 mg under the tongue every 5 minutes as needed for chest pain For chest pain place 1 tablet under the tongue every 5 minutes for 3 doses. If symptoms persist 5 minutes after 1st dose call 911.       potassium chloride ER  (KLOR-CON M) 20 MEQ CR tablet Take 60 mEq by mouth 3 times daily   8/30/2023 at 0830    sacubitril-valsartan (ENTRESTO)  MG per tablet Take 1 tablet by mouth 2 times daily   8/30/2023 at 2200    sertraline (ZOLOFT) 100 MG tablet Take 100 mg by mouth daily       spironolactone (ALDACTONE) 25 MG tablet Take 50 mg by mouth every morning   8/30/2023 at 0830    torsemide (DEMADEX) 20 MG tablet Take 100 mg by mouth 2 times daily   8/30/2023 at 2200    Vitamin D, Cholecalciferol, 10 MCG (400 UNIT) TABS Take 400 Units by mouth daily       carvedilol (COREG) 3.125 MG tablet Take 3.125 mg by mouth 2 times daily        Allergies:  Allergies   Allergen Reactions    Aspirin Hives     Told nursing on 6/1/22 she breaks out in hives if she takes aspirin.    Ibuprofen Hives    Lisinopril Cough    Oxycodone-Acetaminophen Nausea and Vomiting     ROS:  ROS: 10 point ROS neg other than the symptoms noted above in the HPI.    Physical Exam:  Temp:  [97.7  F (36.5  C)-99.1  F (37.3  C)] 98.7  F (37.1  C)  Pulse:  [72-96] 75  Resp:  [18-20] 20  BP: (122-150)/() 126/92  Cuff Mean (mmHg):  [95] 95  SpO2:  [96 %-100 %] 96 %  Gen: NAD, resting comfortably in bed, conversational  HEENT: normocephalic, atraumatic cranium, EOMI, sclerae anicteric.   Lungs: CTA in all fields, no wheezing or rhonchi  CV: RRR, S1S2 normal, no murmur. Radial pulses and DP pulses symmetric. No dependent peripheral edema. Cannot well visualize abdominal edema patient notes.   Abd: Positive normal pitched bowel sounds, overall soft and non distended, nontender.  Musculoskeletal: grossly intact, strength 5/5 upper and lower extremities  Neuro: AOx3, CN II-VII grossly intact, sensation/motor intact in upper and lower extremities  Mental: normal mood and affect, regular rate of speech    Labs:  ABG No lab results found in last 7 days.  CBC  Recent Labs   Lab 08/31/23  0653   WBC 5.9   HGB 13.8        BMP  Recent Labs   Lab 09/01/23  1110 09/01/23  0718  23  2130 23  1805 23  1458 23  0704   NA  --  136  --   --   --  140   POTASSIUM  --  3.3*  --   --   --  3.4   CHLORIDE  --  100  --   --   --  104   CO2  --  22  --   --   --  24   BUN  --  27.2*  --   --   --  17.0   CR  --  1.22*  --   --   --  1.02*   * 233* 215* 177*   < > 178*    < > = values in this interval not displayed.     LFT  Recent Labs   Lab 23  0717 23  0704   AST 17  --    ALT 7  --    ALKPHOS 94  --    BILITOTAL 1.2  --    ALBUMIN 4.3  --    INR  --  1.05     PancreasNo lab results found in last 7 days.    Imaging:  Recent Results (from the past 24 hour(s))   Echo Complete   Result Value    LVEF  15-20% (severely reduced)    Narrative    [id number]  [id number]  [id number]  [id number]^CHAD^EFRAIN     [facility],[place]  Echocardiography Laboratory  [address] [place], [place] [id number]     Name: RITO CHRISTY  MRN: [id number]  : 1975  Study Date: 2023 02:49 PM  Age: 47 yrs  Gender: Female  Patient Location: [facility]  Reason For Study: CHF  Ordering Physician: EFRAIN BONILLA  Referring Physician: DELFINA COOPER  Performed By: Amberly Casas     BSA: 1.9 m2  Height: 67 in  Weight: 172 lb  ______________________________________________________________________________  Procedure  Echocardiogram with two-dimensional, color and spectral Doppler performed.  Contrast Optison. Optison (NDC #4329-4548-47) given intravenously. Patient was  given 6 ml mixture of 3 ml Optison and 6 ml saline. 3 ml wasted.  ______________________________________________________________________________  Interpretation Summary  Left ventricular function is decreased. The ejection fraction is 15-20%  (severely reduced). Severe diffuse hypokinesis is present.  Global right ventricular function is normal. The right ventricle is normal  size.  No significant valvular abnormalities.  IVC diameter <2.1 cm collapsing >50% with sniff suggests a normal RA pressure  of 3  mmHg.  There is no prior study for direct comparison.  ______________________________________________________________________________  Left Ventricle  Left ventricular wall thickness is normal. Severe left ventricular dilation is  present. Left ventricular function is decreased. The ejection fraction is 15-  20% (severely reduced). Grade II or moderate diastolic dysfunction. Diastolic  Doppler findings (E/E' ratio and/or other parameters) suggest left ventricular  filling pressures are increased. Severe diffuse hypokinesis is present.     Right Ventricle  Global right ventricular function is normal. The right ventricle is normal  size. A pacemaker lead is noted in the right ventricle.     Atria  The right atria appears normal. Moderate left atrial enlargement is present.     Mitral Valve  Mild mitral insufficiency is present.     Aortic Valve  The aortic valve is tricuspid. On Doppler interrogation, there is no  significant stenosis or regurgitation.     Tricuspid Valve  Mild tricuspid insufficiency is present. The right ventricular systolic  pressure is approximated at 33.7 mmHg plus the right atrial pressure.     Pulmonic Valve  The valve leaflets are not well visualized. Trace pulmonic insufficiency is  present.     Vessels  The aorta root is normal. The thoracic aorta is normal. IVC diameter <2.1 cm  collapsing >50% with sniff suggests a normal RA pressure of 3 mmHg.     Pericardium  No pericardial effusion is present.     Compared to Previous Study  There is no prior study for direct comparison.  ______________________________________________________________________________  MMode/2D Measurements & Calculations  IVSd: 0.90 cm  LVIDd: 6.4 cm  LVIDs: 5.5 cm  LVPWd: 0.87 cm  FS: 14.1 %  LV mass(C)d: 239.8 grams  LV mass(C)dI: 126.4 grams/m2  Ao root diam: 3.4 cm  asc Aorta Diam: 2.9 cm  LVOT diam: 2.4 cm  LVOT area: 4.4 cm2  Ao root diam Index (cm/m2): 1.8  asc Aorta Diam Index (cm/m2): 1.5  LA Volume (BP): 100.0  ml     LA Volume Index (BP): 52.6 ml/m2  RWT: 0.27     Doppler Measurements & Calculations  MV E max julio: 90.1 cm/sec  MV A max julio: 41.2 cm/sec  MV E/A: 2.2  MV dec time: 0.15 sec  TR max julio: 290.4 cm/sec  TR max P.7 mmHg  E/E' av.0  Lateral E/e': 13.1  Medial E/e': 18.8  RV S Julio: 10.8 cm/sec     ______________________________________________________________________________  Report approved by: France Ruiz 2023 03:46 PM         XR Chest Port 1 View    Narrative    XR CHEST PORT 1 VIEW  2023 10:01 PM     HISTORY:  48 y/o F s/p RHC complaining of chest pain and some SOB, r/o  pneumothorax       COMPARISON:  2023    FINDINGS:   Frontal view of the chest. Left chest wall implantable cardiac  defibrillator.     Stable prominent cardiac silhouette. Minimal bibasilar streaky  opacities. No focal consolidation, pleural effusion or pneumothorax.      Impression    IMPRESSION: Minimal bibasilar atelectasis. No focal consolidation. No  pneumothorax.    I have personally reviewed the examination and initial interpretation  and I agree with the findings.    BERTHA GILL MD         SYSTEM ID:  [id number]   Cardiopulmonary Stress Test- Adult   Result Value    Target     Baseline /90    Baseline HR 78    Rest /96    Rest 92    Last Stress /94    Max HR  106    Max Predicted HR  61    Exercise duration (min) 5    Exercise duration (sec) 12    Estimated workload 3.6    Angina Index 2    RPE 14    PREDICTED VO2MAX 29.8    Max 14,310    CARPULSTRPH1 2    CARPULSTRPH1 101    CARPULBPPH1 120/90    CARPULSTRPH1 100    CARPULSTRPH2 4    CARPULSTRPH2 101    CARPULBPPH2 125/89    CARPULSTRPH2 100    CARPULSTRPH3 5    [id number] 12    CARPULSTRPH3 104    CARPULBPPH3 135/94    CARPULSTRPH3 100    CARPULSTRPB1 1    CARPULSTRPHB1 98    CARPULBPPHB1 134/95    CARPULSTRPHB1 100    CARPULSTRPHB2 3    CARPULSTRPHB2 91    CARPULBPPHB2 132/96    CARPULSTRPHB2 100    CARPULSTRPHB3 7     CARPULSTRPHB3 88    CARPULBPPHB3 125/97    CARPULSTRPHB3 100    Max 12.70    Predicted 29.80    Percent 43    RER 0.99    VE/VCO2 [place]  59.11    Actual SVC (L) 3.60    Predicted SVC (L) 4.00    Percent SVC (L) 90    Actual FVC (L) 3.68    Predicted FVC (L) 4.00    Percent FVC (L) 92    Actual FVC (L) 3.09    Predicted FVC (L) 3.21    Percent FVC (L) 96    Actual FEV 1.0/FVC % 84.0    Predicted FEV 1.0/ FVC %  81.3    Percent FEV 1.0 FVC% 103    Rest 4.70    Max 12.70    Rest  1.40    Rest  387.00    Max 1,013.00    Rest 1.07    Max 0.99    Rest 4.00    Max  5.60    Rest 24.00    Max 61.50    Rest 1,089.00    Max  2,651.00    Rest 22.00    Max  23.00    Rest 77.80    Max 43.00    Rest 66.00    Max  60.00    Rest 62.00    Max  61.00    Max 59.11    Rest 130.00    Max 127.00    Rest 18.00    Max 19.00    Rest  100.00    Max 100.00

## 2023-09-01 NOTE — PLAN OF CARE
Goal Outcome Evaluation:      Plan of Care Reviewed With: patient    Overall Patient Progress: no changeOverall Patient Progress: no change    Outcome Evaluation: RD assessment in chart 9/1

## 2023-09-01 NOTE — PLAN OF CARE
Jamilah Jiménez is a 47-year-old female with HFrEF, Class III, Stage C-D, DM2, HTN, PE admitted following RHC with CI 1.61. Admitted for further evaluation and consideration for advanced heart failure therapies.    Shift 15:00-23:30  Neuro: A&O x 4. Pleasant and cooperative.  Resp: Lungs CTA. C/o some mild SOB. Sats 99% on RA. CXR ordered by MD.  CV: SR/ST 90s-120s. VSS. C/o CP 8/10. MD called who came and saw pt. EKG done without change from prior. Tylenol and ryan awad ordered and given. Slight pain at R neck from earlier RHC.  GI/: Excellent appetite, good urine output. Pt told about 2lFR.  Mobility: Up ad van.  Pain: C/o some R neck and CP treated with tylenol.  Plan: Stress test tomorrow. Cards 2 with issues. Workup for advanced heart failure therapies.

## 2023-09-01 NOTE — PROGRESS NOTES
CLINICAL NUTRITION SERVICES    Reason for Assessment:  Heart-healthy nutrition education, received consult.    Diet History:  Pt reports following heart healthy diet PTA.     Nutrition Prescription/Recs:  Continue low sodium diet.      Interventions:  Nutrition Education  1. Pt politely declined verbal instruction on a heart-healthy diet. Pt did not have any questions about heart healthy diet.  2. Pt accepting of handouts provided including: NCM Heart Failure Nutrition Therapy; [place] Heart-Healthy Label Reading Tips; Cardiac Diet Menu; Salt and Fat content of menu items .      Goals:    1. Pt will verbalize at least four foods high in saturated or trans-fats and five foods high in sodium.    2. Pt will list at least two interventions to make current meal plan more heart-healthy.     Follow-up:   Patient to ask any further nutrition-related questions before discharge. In addition, pt may request outpatient RD appointment.    Ayleen Eduardo RDN, LD    6C (beds [id number])/7C (beds [id number])/ED   RD pager: [phone]  Weekend/Holiday RD pager: [phone]

## 2023-09-02 ENCOUNTER — APPOINTMENT (OUTPATIENT)
Dept: CT IMAGING | Facility: CLINIC | Age: 48
End: 2023-09-02
Attending: PHYSICIAN ASSISTANT
Payer: MEDICAID

## 2023-09-02 ENCOUNTER — APPOINTMENT (OUTPATIENT)
Dept: ULTRASOUND IMAGING | Facility: CLINIC | Age: 48
End: 2023-09-02
Payer: MEDICAID

## 2023-09-02 LAB
ABO/RH(D): NORMAL
ABO/RH(D): NORMAL
ALBUMIN SERPL BCG-MCNC: 4.5 G/DL (ref 3.5–5.2)
ALP SERPL-CCNC: 93 U/L (ref 35–104)
ALT SERPL W P-5'-P-CCNC: 13 U/L (ref 0–50)
ANION GAP SERPL CALCULATED.3IONS-SCNC: 18 MMOL/L (ref 7–15)
ANTIBODY SCREEN: NEGATIVE
APTT PPP: 29 SECONDS (ref 22–38)
AST SERPL W P-5'-P-CCNC: 23 U/L (ref 0–45)
ATRIAL RATE - MUSE: 88 BPM
BASOPHILS # BLD AUTO: 0 10E3/UL (ref 0–0.2)
BASOPHILS NFR BLD AUTO: 1 %
BILIRUB SERPL-MCNC: 1 MG/DL
BLAIMP ISLT/SPM QL: NOT DETECTED
BLAKPC ISLT/SPM QL: NOT DETECTED
BLAOXA-48 ISLT/SPM QL: NOT DETECTED
BLAVIM ISLT/SPM QL: NOT DETECTED
BUN SERPL-MCNC: 36.9 MG/DL (ref 6–20)
CALCIUM SERPL-MCNC: 9.9 MG/DL (ref 8.6–10)
CHLORIDE SERPL-SCNC: 99 MMOL/L (ref 98–107)
CHOLEST SERPL-MCNC: 210 MG/DL
CHOLEST SERPL-MCNC: 220 MG/DL
CREAT SERPL-MCNC: 1.52 MG/DL (ref 0.51–0.95)
DEPRECATED HCO3 PLAS-SCNC: 19 MMOL/L (ref 22–29)
DIASTOLIC BLOOD PRESSURE - MUSE: NORMAL MMHG
EOSINOPHIL # BLD AUTO: 0.2 10E3/UL (ref 0–0.7)
EOSINOPHIL NFR BLD AUTO: 3 %
ERYTHROCYTE [DISTWIDTH] IN BLOOD BY AUTOMATED COUNT: 13.9 % (ref 10–15)
FERRITIN SERPL-MCNC: 97 NG/ML (ref 6–175)
GFR SERPL CREATININE-BSD FRML MDRD: 43 ML/MIN/1.73M2
GLUCOSE BLDC GLUCOMTR-MCNC: 187 MG/DL (ref 70–99)
GLUCOSE BLDC GLUCOMTR-MCNC: 194 MG/DL (ref 70–99)
GLUCOSE BLDC GLUCOMTR-MCNC: 201 MG/DL (ref 70–99)
GLUCOSE BLDC GLUCOMTR-MCNC: 206 MG/DL (ref 70–99)
GLUCOSE BLDC GLUCOMTR-MCNC: 224 MG/DL (ref 70–99)
GLUCOSE BLDC GLUCOMTR-MCNC: 231 MG/DL (ref 70–99)
GLUCOSE SERPL-MCNC: 213 MG/DL (ref 70–99)
GLUCOSE SERPL-MCNC: 213 MG/DL (ref 70–99)
HAV IGG SER QL IA: NONREACTIVE
HBA1C MFR BLD: 8.3 %
HBA1C MFR BLD: 8.8 %
HBV CORE AB SERPL QL IA: NONREACTIVE
HBV SURFACE AB SERPL IA-ACNC: 642.09 M[IU]/ML
HBV SURFACE AB SERPL IA-ACNC: REACTIVE M[IU]/ML
HBV SURFACE AG SERPL QL IA: NONREACTIVE
HCG INTACT+B SERPL-ACNC: <1 MIU/ML
HCT VFR BLD AUTO: 45.6 % (ref 35–47)
HCV AB SERPL QL IA: NONREACTIVE
HDLC SERPL-MCNC: 42 MG/DL
HDLC SERPL-MCNC: 44 MG/DL
HGB BLD-MCNC: 15.6 G/DL (ref 11.7–15.7)
HIV 1+2 AB+HIV1 P24 AG SERPL QL IA: NONREACTIVE
HOLD SPECIMEN: NORMAL
IMM GRANULOCYTES # BLD: 0 10E3/UL
IMM GRANULOCYTES NFR BLD: 0 %
INR PPP: 1.05 (ref 0.85–1.15)
INTERPRETATION ECG - MUSE: NORMAL
IRON BINDING CAPACITY (ROCHE): 396 UG/DL (ref 240–430)
IRON SATN MFR SERPL: 24 % (ref 15–46)
IRON SERPL-MCNC: 94 UG/DL (ref 37–145)
LDLC SERPL CALC-MCNC: 137 MG/DL
LDLC SERPL CALC-MCNC: 139 MG/DL
LYMPHOCYTES # BLD AUTO: 2 10E3/UL (ref 0.8–5.3)
LYMPHOCYTES NFR BLD AUTO: 29 %
MAGNESIUM SERPL-MCNC: 2.2 MG/DL (ref 1.7–2.3)
MCH RBC QN AUTO: 28.3 PG (ref 26.5–33)
MCHC RBC AUTO-ENTMCNC: 34.2 G/DL (ref 31.5–36.5)
MCV RBC AUTO: 83 FL (ref 78–100)
MONOCYTES # BLD AUTO: 0.3 10E3/UL (ref 0–1.3)
MONOCYTES NFR BLD AUTO: 5 %
NDM TARGET DNA: NOT DETECTED
NEUTROPHILS # BLD AUTO: 4.2 10E3/UL (ref 1.6–8.3)
NEUTROPHILS NFR BLD AUTO: 62 %
NONHDLC SERPL-MCNC: 168 MG/DL
NONHDLC SERPL-MCNC: 176 MG/DL
NRBC # BLD AUTO: 0 10E3/UL
NRBC BLD AUTO-RTO: 0 /100
P AXIS - MUSE: 70 DEGREES
PHOSPHATE SERPL-MCNC: 4.4 MG/DL (ref 2.5–4.5)
PHOSPHATE SERPL-MCNC: 5.1 MG/DL (ref 2.5–4.5)
PLATELET # BLD AUTO: 234 10E3/UL (ref 150–450)
POTASSIUM SERPL-SCNC: 3.2 MMOL/L (ref 3.4–5.3)
POTASSIUM SERPL-SCNC: 3.2 MMOL/L (ref 3.4–5.3)
POTASSIUM SERPL-SCNC: 4 MMOL/L (ref 3.4–5.3)
POTASSIUM SERPL-SCNC: 5.4 MMOL/L (ref 3.4–5.3)
PR INTERVAL - MUSE: 138 MS
PROT SERPL-MCNC: 8.5 G/DL (ref 6.4–8.3)
PSA SERPL DL<=0.01 NG/ML-MCNC: 0.02 NG/ML (ref 0–2.5)
QRS DURATION - MUSE: 80 MS
QT - MUSE: 368 MS
QTC - MUSE: 445 MS
R AXIS - MUSE: -42 DEGREES
RADIOLOGIST FLAGS: ABNORMAL
RADIOLOGIST FLAGS: ABNORMAL
RBC # BLD AUTO: 5.51 10E6/UL (ref 3.8–5.2)
SODIUM SERPL-SCNC: 136 MMOL/L (ref 136–145)
SPECIMEN EXPIRATION DATE: NORMAL
SPECIMEN EXPIRATION DATE: NORMAL
SYSTOLIC BLOOD PRESSURE - MUSE: NORMAL MMHG
T AXIS - MUSE: 37 DEGREES
T PALLIDUM AB SER QL: NONREACTIVE
TRANSFERRIN SERPL-MCNC: 351 MG/DL (ref 200–360)
TRIGL SERPL-MCNC: 154 MG/DL
TRIGL SERPL-MCNC: 183 MG/DL
TSH SERPL DL<=0.005 MIU/L-ACNC: 1.12 UIU/ML (ref 0.3–4.2)
VENTRICULAR RATE- MUSE: 88 BPM
WBC # BLD AUTO: 6.7 10E3/UL (ref 4–11)

## 2023-09-02 PROCEDURE — 86696 HERPES SIMPLEX TYPE 2 TEST: CPT | Performed by: INTERNAL MEDICINE

## 2023-09-02 PROCEDURE — 85730 THROMBOPLASTIN TIME PARTIAL: CPT | Performed by: INTERNAL MEDICINE

## 2023-09-02 PROCEDURE — 99291 CRITICAL CARE FIRST HOUR: CPT | Mod: GC | Performed by: PSYCHIATRY & NEUROLOGY

## 2023-09-02 PROCEDURE — 86777 TOXOPLASMA ANTIBODY: CPT | Performed by: INTERNAL MEDICINE

## 2023-09-02 PROCEDURE — 99233 SBSQ HOSP IP/OBS HIGH 50: CPT | Performed by: PHYSICIAN ASSISTANT

## 2023-09-02 PROCEDURE — 83550 IRON BINDING TEST: CPT | Performed by: INTERNAL MEDICINE

## 2023-09-02 PROCEDURE — 84100 ASSAY OF PHOSPHORUS: CPT

## 2023-09-02 PROCEDURE — 84100 ASSAY OF PHOSPHORUS: CPT | Performed by: INTERNAL MEDICINE

## 2023-09-02 PROCEDURE — 86708 HEPATITIS A ANTIBODY: CPT | Performed by: INTERNAL MEDICINE

## 2023-09-02 PROCEDURE — 250N000011 HC RX IP 250 OP 636: Performed by: INTERNAL MEDICINE

## 2023-09-02 PROCEDURE — 80053 COMPREHEN METABOLIC PANEL: CPT | Performed by: INTERNAL MEDICINE

## 2023-09-02 PROCEDURE — 250N000011 HC RX IP 250 OP 636

## 2023-09-02 PROCEDURE — 84443 ASSAY THYROID STIM HORMONE: CPT | Performed by: INTERNAL MEDICINE

## 2023-09-02 PROCEDURE — 999N000128 HC STATISTIC PERIPHERAL IV START W/O US GUIDANCE

## 2023-09-02 PROCEDURE — 86706 HEP B SURFACE ANTIBODY: CPT | Performed by: INTERNAL MEDICINE

## 2023-09-02 PROCEDURE — 81378 HLA I & II TYPING HR: CPT | Performed by: INTERNAL MEDICINE

## 2023-09-02 PROCEDURE — 76700 US EXAM ABDOM COMPLETE: CPT

## 2023-09-02 PROCEDURE — 36415 COLL VENOUS BLD VENIPUNCTURE: CPT | Performed by: INTERNAL MEDICINE

## 2023-09-02 PROCEDURE — 86704 HEP B CORE ANTIBODY TOTAL: CPT | Performed by: INTERNAL MEDICINE

## 2023-09-02 PROCEDURE — 999N000175 HC STATISTIC STROKE CODE W/ ACCESS

## 2023-09-02 PROCEDURE — 85610 PROTHROMBIN TIME: CPT | Performed by: INTERNAL MEDICINE

## 2023-09-02 PROCEDURE — 86787 VARICELLA-ZOSTER ANTIBODY: CPT | Performed by: INTERNAL MEDICINE

## 2023-09-02 PROCEDURE — 84466 ASSAY OF TRANSFERRIN: CPT | Performed by: INTERNAL MEDICINE

## 2023-09-02 PROCEDURE — 250N000011 HC RX IP 250 OP 636: Mod: JZ | Performed by: STUDENT IN AN ORGANIZED HEALTH CARE EDUCATION/TRAINING PROGRAM

## 2023-09-02 PROCEDURE — 82728 ASSAY OF FERRITIN: CPT | Performed by: INTERNAL MEDICINE

## 2023-09-02 PROCEDURE — 83036 HEMOGLOBIN GLYCOSYLATED A1C: CPT | Performed by: INTERNAL MEDICINE

## 2023-09-02 PROCEDURE — G0103 PSA SCREENING: HCPCS | Performed by: INTERNAL MEDICINE

## 2023-09-02 PROCEDURE — 70498 CT ANGIOGRAPHY NECK: CPT | Mod: 26 | Performed by: STUDENT IN AN ORGANIZED HEALTH CARE EDUCATION/TRAINING PROGRAM

## 2023-09-02 PROCEDURE — 84132 ASSAY OF SERUM POTASSIUM: CPT | Performed by: INTERNAL MEDICINE

## 2023-09-02 PROCEDURE — 70496 CT ANGIOGRAPHY HEAD: CPT

## 2023-09-02 PROCEDURE — 3E03317 INTRODUCTION OF OTHER THROMBOLYTIC INTO PERIPHERAL VEIN, PERCUTANEOUS APPROACH: ICD-10-PCS | Performed by: INTERNAL MEDICINE

## 2023-09-02 PROCEDURE — 83036 HEMOGLOBIN GLYCOSYLATED A1C: CPT

## 2023-09-02 PROCEDURE — 80321 ALCOHOLS BIOMARKERS 1OR 2: CPT | Performed by: INTERNAL MEDICINE

## 2023-09-02 PROCEDURE — 86825 HLA X-MATH NON-CYTOTOXIC: CPT | Performed by: INTERNAL MEDICINE

## 2023-09-02 PROCEDURE — 86832 HLA CLASS I HIGH DEFIN QUAL: CPT | Performed by: INTERNAL MEDICINE

## 2023-09-02 PROCEDURE — 70496 CT ANGIOGRAPHY HEAD: CPT | Mod: 26 | Performed by: STUDENT IN AN ORGANIZED HEALTH CARE EDUCATION/TRAINING PROGRAM

## 2023-09-02 PROCEDURE — 84134 ASSAY OF PREALBUMIN: CPT | Performed by: INTERNAL MEDICINE

## 2023-09-02 PROCEDURE — 250N000013 HC RX MED GY IP 250 OP 250 PS 637: Performed by: INTERNAL MEDICINE

## 2023-09-02 PROCEDURE — 86644 CMV ANTIBODY: CPT | Performed by: INTERNAL MEDICINE

## 2023-09-02 PROCEDURE — 84702 CHORIONIC GONADOTROPIN TEST: CPT | Performed by: INTERNAL MEDICINE

## 2023-09-02 PROCEDURE — 250N000013 HC RX MED GY IP 250 OP 250 PS 637: Performed by: NURSE PRACTITIONER

## 2023-09-02 PROCEDURE — 99291 CRITICAL CARE FIRST HOUR: CPT | Mod: 25 | Performed by: INTERNAL MEDICINE

## 2023-09-02 PROCEDURE — 87798 DETECT AGENT NOS DNA AMP: CPT | Performed by: INTERNAL MEDICINE

## 2023-09-02 PROCEDURE — 86780 TREPONEMA PALLIDUM: CPT | Performed by: INTERNAL MEDICINE

## 2023-09-02 PROCEDURE — 86481 TB AG RESPONSE T-CELL SUSP: CPT | Performed by: INTERNAL MEDICINE

## 2023-09-02 PROCEDURE — 86803 HEPATITIS C AB TEST: CPT | Performed by: INTERNAL MEDICINE

## 2023-09-02 PROCEDURE — 92610 EVALUATE SWALLOWING FUNCTION: CPT | Mod: GN

## 2023-09-02 PROCEDURE — 92526 ORAL FUNCTION THERAPY: CPT | Mod: GN

## 2023-09-02 PROCEDURE — 86665 EPSTEIN-BARR CAPSID VCA: CPT | Performed by: INTERNAL MEDICINE

## 2023-09-02 PROCEDURE — 80307 DRUG TEST PRSMV CHEM ANLYZR: CPT | Performed by: INTERNAL MEDICINE

## 2023-09-02 PROCEDURE — 87340 HEPATITIS B SURFACE AG IA: CPT | Performed by: INTERNAL MEDICINE

## 2023-09-02 PROCEDURE — 86833 HLA CLASS II HIGH DEFIN QUAL: CPT | Performed by: INTERNAL MEDICINE

## 2023-09-02 PROCEDURE — 83735 ASSAY OF MAGNESIUM: CPT | Performed by: INTERNAL MEDICINE

## 2023-09-02 PROCEDURE — 70498 CT ANGIOGRAPHY NECK: CPT

## 2023-09-02 PROCEDURE — 200N000002 HC R&B ICU UMMC

## 2023-09-02 PROCEDURE — 85390 FIBRINOLYSINS SCREEN I&R: CPT | Mod: 26 | Performed by: PATHOLOGY

## 2023-09-02 PROCEDURE — 99291 CRITICAL CARE FIRST HOUR: CPT | Performed by: PSYCHIATRY & NEUROLOGY

## 2023-09-02 PROCEDURE — 76700 US EXAM ABDOM COMPLETE: CPT | Mod: 26 | Performed by: STUDENT IN AN ORGANIZED HEALTH CARE EDUCATION/TRAINING PROGRAM

## 2023-09-02 PROCEDURE — 80061 LIPID PANEL: CPT | Performed by: INTERNAL MEDICINE

## 2023-09-02 PROCEDURE — 85025 COMPLETE CBC W/AUTO DIFF WBC: CPT | Performed by: INTERNAL MEDICINE

## 2023-09-02 PROCEDURE — 86850 RBC ANTIBODY SCREEN: CPT | Performed by: INTERNAL MEDICINE

## 2023-09-02 RX ORDER — ONDANSETRON 2 MG/ML
4 INJECTION INTRAMUSCULAR; INTRAVENOUS EVERY 6 HOURS PRN
Status: DISCONTINUED | OUTPATIENT
Start: 2023-09-02 | End: 2023-09-11 | Stop reason: HOSPADM

## 2023-09-02 RX ORDER — HYDROXYZINE HYDROCHLORIDE 25 MG/1
50 TABLET, FILM COATED ORAL EVERY 6 HOURS PRN
Status: DISCONTINUED | OUTPATIENT
Start: 2023-09-02 | End: 2023-09-04

## 2023-09-02 RX ORDER — ATORVASTATIN CALCIUM 10 MG/1
20 TABLET, FILM COATED ORAL AT BEDTIME
Status: DISCONTINUED | OUTPATIENT
Start: 2023-09-02 | End: 2023-09-03

## 2023-09-02 RX ORDER — LIDOCAINE 40 MG/G
CREAM TOPICAL
Status: DISCONTINUED | OUTPATIENT
Start: 2023-09-02 | End: 2023-09-11 | Stop reason: HOSPADM

## 2023-09-02 RX ORDER — ACETAMINOPHEN 325 MG/1
650 TABLET ORAL EVERY 4 HOURS PRN
Status: DISCONTINUED | OUTPATIENT
Start: 2023-09-02 | End: 2023-09-09

## 2023-09-02 RX ORDER — HYDROXYZINE HYDROCHLORIDE 25 MG/1
25 TABLET, FILM COATED ORAL EVERY 6 HOURS PRN
Status: DISCONTINUED | OUTPATIENT
Start: 2023-09-02 | End: 2023-09-04

## 2023-09-02 RX ORDER — DAPAGLIFLOZIN 10 MG/1
10 TABLET, FILM COATED ORAL EVERY MORNING
Status: DISCONTINUED | OUTPATIENT
Start: 2023-09-03 | End: 2023-09-11 | Stop reason: HOSPADM

## 2023-09-02 RX ORDER — HYDRALAZINE HYDROCHLORIDE 20 MG/ML
10-20 INJECTION INTRAMUSCULAR; INTRAVENOUS EVERY 30 MIN PRN
Status: DISCONTINUED | OUTPATIENT
Start: 2023-09-02 | End: 2023-09-04

## 2023-09-02 RX ORDER — SPIRONOLACTONE 50 MG/1
50 TABLET, FILM COATED ORAL EVERY MORNING
Status: DISCONTINUED | OUTPATIENT
Start: 2023-09-03 | End: 2023-09-07

## 2023-09-02 RX ORDER — IOPAMIDOL 755 MG/ML
67 INJECTION, SOLUTION INTRAVASCULAR ONCE
Status: COMPLETED | OUTPATIENT
Start: 2023-09-02 | End: 2023-09-02

## 2023-09-02 RX ORDER — POTASSIUM CHLORIDE 20MEQ/15ML
60 LIQUID (ML) ORAL 3 TIMES DAILY
Status: DISCONTINUED | OUTPATIENT
Start: 2023-09-02 | End: 2023-09-07

## 2023-09-02 RX ORDER — HYDRALAZINE HYDROCHLORIDE 25 MG/1
25 TABLET, FILM COATED ORAL 3 TIMES DAILY
Status: DISCONTINUED | OUTPATIENT
Start: 2023-09-02 | End: 2023-09-04

## 2023-09-02 RX ORDER — POTASSIUM CHLORIDE 750 MG/1
40 TABLET, EXTENDED RELEASE ORAL ONCE
Status: COMPLETED | OUTPATIENT
Start: 2023-09-02 | End: 2023-09-02

## 2023-09-02 RX ORDER — LABETALOL HYDROCHLORIDE 5 MG/ML
10-20 INJECTION, SOLUTION INTRAVENOUS EVERY 10 MIN PRN
Status: DISCONTINUED | OUTPATIENT
Start: 2023-09-02 | End: 2023-09-04

## 2023-09-02 RX ORDER — TORSEMIDE 100 MG/1
100 TABLET ORAL
Status: DISCONTINUED | OUTPATIENT
Start: 2023-09-02 | End: 2023-09-05

## 2023-09-02 RX ADMIN — HYDRALAZINE HYDROCHLORIDE 25 MG: 25 TABLET, FILM COATED ORAL at 08:25

## 2023-09-02 RX ADMIN — POTASSIUM CHLORIDE 60 MEQ: 1500 TABLET, EXTENDED RELEASE ORAL at 08:45

## 2023-09-02 RX ADMIN — INSULIN ASPART 2 UNITS: 100 INJECTION, SOLUTION INTRAVENOUS; SUBCUTANEOUS at 12:42

## 2023-09-02 RX ADMIN — TORSEMIDE 100 MG: 100 TABLET ORAL at 08:24

## 2023-09-02 RX ADMIN — TORSEMIDE 100 MG: 100 TABLET ORAL at 15:55

## 2023-09-02 RX ADMIN — DAPAGLIFLOZIN 10 MG: 10 TABLET, FILM COATED ORAL at 08:25

## 2023-09-02 RX ADMIN — HYDROXYZINE HYDROCHLORIDE 25 MG: 25 TABLET, FILM COATED ORAL at 16:28

## 2023-09-02 RX ADMIN — INSULIN ASPART 2 UNITS: 100 INJECTION, SOLUTION INTRAVENOUS; SUBCUTANEOUS at 08:27

## 2023-09-02 RX ADMIN — HYDRALAZINE HYDROCHLORIDE 25 MG: 25 TABLET, FILM COATED ORAL at 15:54

## 2023-09-02 RX ADMIN — IOPAMIDOL 67 ML: 755 INJECTION, SOLUTION INTRAVENOUS at 11:20

## 2023-09-02 RX ADMIN — ONDANSETRON 4 MG: 2 INJECTION INTRAMUSCULAR; INTRAVENOUS at 10:43

## 2023-09-02 RX ADMIN — ATORVASTATIN CALCIUM 20 MG: 10 TABLET, FILM COATED ORAL at 20:03

## 2023-09-02 RX ADMIN — SERTRALINE HYDROCHLORIDE 100 MG: 50 TABLET ORAL at 08:24

## 2023-09-02 RX ADMIN — INSULIN ASPART 2 UNITS: 100 INJECTION, SOLUTION INTRAVENOUS; SUBCUTANEOUS at 16:44

## 2023-09-02 RX ADMIN — POTASSIUM CHLORIDE 40 MEQ: 750 TABLET, EXTENDED RELEASE ORAL at 08:44

## 2023-09-02 RX ADMIN — SACUBITRIL AND VALSARTAN 1 TABLET: 97; 103 TABLET, FILM COATED ORAL at 08:24

## 2023-09-02 RX ADMIN — INSULIN DETEMIR 30 UNITS: 100 INJECTION, SOLUTION SUBCUTANEOUS at 22:31

## 2023-09-02 RX ADMIN — HYDRALAZINE HYDROCHLORIDE 25 MG: 25 TABLET, FILM COATED ORAL at 20:03

## 2023-09-02 RX ADMIN — HYDROXYZINE HYDROCHLORIDE 50 MG: 25 TABLET, FILM COATED ORAL at 22:35

## 2023-09-02 RX ADMIN — SPIRONOLACTONE 50 MG: 25 TABLET ORAL at 08:24

## 2023-09-02 RX ADMIN — PROCHLORPERAZINE EDISYLATE 5 MG: 5 INJECTION INTRAMUSCULAR; INTRAVENOUS at 15:12

## 2023-09-02 ASSESSMENT — ACTIVITIES OF DAILY LIVING (ADL)
ADLS_ACUITY_SCORE: 22
ADLS_ACUITY_SCORE: 22
ADLS_ACUITY_SCORE: 20
ADLS_ACUITY_SCORE: 22
ADLS_ACUITY_SCORE: 20

## 2023-09-02 ASSESSMENT — VISUAL ACUITY
OU: NORMAL ACUITY
OU: BLURRED VISION
OU: NORMAL ACUITY
OU: BLURRED VISION
OU: BLURRED VISION
OU: NORMAL ACUITY
OU: BLURRED VISION
OU: NORMAL ACUITY
OU: BLURRED VISION
OU: NORMAL ACUITY
OU: NORMAL ACUITY
OU: BLURRED VISION
OU: NORMAL ACUITY
OU: BLURRED VISION
OU: BLURRED VISION
OU: NORMAL ACUITY
OU: BLURRED VISION
OU: NORMAL ACUITY
OU: BLURRED VISION
OU: BLURRED VISION
OU: NORMAL ACUITY
OU: NORMAL ACUITY
OU: BLURRED VISION

## 2023-09-02 NOTE — PROGRESS NOTES
Addendum to today's progress note:     #Hypokalemia - resolved  - Received Kcl 40 mEq on: 9/1 x2, 9/2 x1   - start Kcl 60mEq TID (9/2 - present)    Tao Davis MD  [facility]  Department of Internal Medicine   Resident Physician  PGY-1  Pager: [phone]

## 2023-09-02 NOTE — PLAN OF CARE
D/I/A: Shift Events  Writer assumed cares [id number]  Patient arrived to  around 1130  TNK given at 11:42  Compazine given for nausea and headache with relief  Passed swallow eval by SLP  Admitted/transferred from: 6D  Reason for admission/transfer: TNK administration and post TNK assessments   2 RN skin assessment: completed by Almaz PEREZ RN and   Result of skin assessment and interventions/actions: Almaz and Angie  Height, weight, drug calc weight: Done  Patient belongings (see Flowsheet)  MDRO education added to care planN/A    Neuro- initial NIH 7: AaOx4; blurred vision in right eye; right sided weakness; numbness in right face, RUE, and RLE; positive right drift; mild to moderate aphasia; mild to moderate dysarthria. Around 1600 symptoms began to improve, by 1630 NIH was zero.  CV- sinus rhythm, HR 70s. SBP 110s-120s.   Pulm- lungs clear. On room air  GI- no BM. C/o nausea relieved with 5mg Compazine.   - voids. Good urine output  Pain- c/o headache relieved with compazine  Activity- bedrest with frequent turns.  Social- family not at bedside, updated about POC via telephone, questions answered.    See flow sheets for further details and assessments.    P: continue to monitor, notify MD of significant changes.      Goal Outcome Evaluation:      Plan of Care Reviewed With: patient, family    Overall Patient Progress: improvingOverall Patient Progress: improving

## 2023-09-02 NOTE — CODE/RAPID RESPONSE
Rapid Response Team Note    Assessment   In assessment a rapid response was called on Jamilah Jiménez due to stroke code. This presentation is likely due to stroke.     Plan   - CT head without contrast, CTA head and neck with contrast ordered stat  - Further management per stroke team  -  The Cardiology primary team was able to be reached and they are in agreement with the above plan.  -  Disposition: The patient  will be dispositioned by the stroke team pending results of CT.  -  Reassessment and plan follow-up will be performed by the  stroke team      James Perez PA-C  [facility] RRT [facility] Job Code Contact #[phone]  [facility] Paging/Directory    Medical Decision Making       MANAGEMENT DISCUSSED with the following over the past 24 hours: Cards 2, Stroke neurology team   NOTE(S)/MEDICAL RECORDS REVIEWED over the past 24 hours: Cardiology progress note  Tests REVIEWED in the past 24 hours:  - See lab/imaging results included in the data section of the note        Hospital Course   Brief Summary of events leading to rapid response:   RRT called for transient hypotension, systolic 80s.  BP improved to 1 teens on arrival.  Bedside RN reports severe nausea not relieved with Zofran.  Patient then had acute onset headache and word finding difficulties.  Stroke code was initiated.  Patient noted to have progressive expressive aphasia and right upper extremity weakness.  Stroke team arrived.  Symptom onset less than 45 minutes.  Patient is being taken for CT.    Admission Diagnosis:   Acute HFrEF (heart failure with reduced ejection fraction) (H) [I50.21]  Benign essential hypertension [I10]  Mixed hyperlipidemia [E78.2]  Heart failure with reduced ejection fraction, NYHA class III (H) [I50.20]  Nonischemic cardiomyopathy (H) [I42.8]  Cardiogenic shock (H) [R57.0]  Other ill-defined heart diseases [I51.89]    Physical Exam   Temp: 98.2  F (36.8  C) Temp  Min: 97.5  F (36.4  C)  Max: 98.8  F (37.1  C)  Resp: 18 Resp   Min: 16  Max: 18  SpO2: 99 % SpO2  Min: 97 %  Max: 100 %  Pulse: 86 Pulse  Min: 78  Max: 89    No data recorded  BP: 119/86 Systolic (24hrs), Av , Min:85 , Max:152   Diastolic (24hrs), Av, Min:63, Max:100     I/Os: I/O last 3 completed shifts:  In: 1060 [P.O.:1060]  Out: 2210 [Urine:2210]     Exam:   General: acutely ill appearing  Mental Status:  Difficult to assess .      Significant Results and Procedures   Lab results reviewed over the past 24 hrs:   Recent Labs   Lab 23  0717 23  0704     --  136 140   POTASSIUM 3.2*  3.2* 3.3* 3.3* 3.4   CHLORIDE 99  --  100 104   CO2 19*  --  22 24   ANIONGAP 18*  --  14 12   BUN 36.9*  --  27.2* 17.0   CR 1.52*  --  1.22* 1.02*   MICHEL 9.9  --  9.5 8.3*   MAG 2.2  --  1.9  --    PHOS 5.1*  --   --   --    PROTTOTAL 8.5*  --  8.2  --    ALBUMIN 4.5  --  4.3  --    BILITOTAL 1.0  --  1.2  --    ALKPHOS 93  --  94  --    AST 23  --  17  --    ALT 13  --  7  --      Recent Labs   Lab 23  0653   WBC 6.7 5.9   RBC 5.51* 4.95   HGB 15.6 13.8   HCT 45.6 41.2   MCV 83 83   MCH 28.3 27.9   MCHC 34.2 33.5   RDW 13.9 14.1    214     Recent Labs   Lab 23  0704   INR 1.05 1.05     No lab results found in last 7 days.   Recent Labs   Lab 23  0718   TSH 1.12     No lab results found in last 7 days.  Recent Labs   Lab 23  1031 23  0826 23  0718 23  2230 23  1837 23  1519 23  1110 23  0717 23  2130 23  1805 23  1458 23  0704   * 206* 213*  213* 224* 150* 166* 295* 233* 215* 177* 227* 178*

## 2023-09-02 NOTE — PROGRESS NOTES
[facility]   Cardiology II Service / Advanced Heart Failure  Daily Progress Note  Date of Service: 9/1/2023      Patient: Jamilah Jiménez  MRN: [id number]  Admission Date: 8/31/2023  Hospital Day # 2    Assessment and Plan:  Jamilah Jiménez is a 47-year-old female with HFrEF, Class III, Stage C-D, DM2, HTN, PE admitted following RHC with CI 1.61. Admitted for further evaluation and consideration for advanced heart failure therapies.     9/2:  - 10:30am: developed nausea, headache, aphasia, right-sided numbness and weakness. Code stroke called. CT head showed mild patchy low attenuation within the left parietal white matte that may represent old infarct; consider MRI to exclude acute infarct given the limitations of CT. Transferred to Neuro ICU.   - held entresto     #Nausea  #Aphasia  #Right-sided numbness and weakness   Code stroke called 10:30am 9/2/2023. CT head showed mild patchy low attenuation within the left parietal white matte that may represent old infarct; consider MRI to exclude acute infarct given the limitations of CT. Transferred to Neuro ICU.     #HFrEF (LVEF 18%), non-ischemic cardiomyopathy (genetic) s/p ICD  #HTN   #HLD   ACC/AHA Stage D, NYHA Symptom Class IV  Patient with progressively worsening dyspnea, edema, overall functional status over the last ~ 2 years. 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 Has been on diuretics, GDMT, has ICD in place.     Primary Cardiologist: Dr. Saucedo Last seen 12/06/2022   Ischemic Eval: Coronary angiogram 12/24/2020 with no significant coronary artery disease, elevated LVEDP 24 mmHg   RHC 08/31/2023: RA 9; PA 41/22/30; Amina CO/CI 3.04/1.61   CMR 08/31/2023: Severely dilated LV with global systolic function severely reduced, LVEF 18%, severe diffuse hypokinesis, RV with normal cavity size and normal global systolic function, RVEF 53%  Cardiopulmonary Stress Test 09/01: Peak VO2 12.70 ml/kg/min;  VE/VCO2 59.11; RER 0.99      Volume: hypervolemic, ascites   Diuretic: Torsemide 100 mg BID   ACE-I/ARB/ARNi: Entresto  mg BID. Held in the setting of low BP during/before code stroke called 9/2/2023.   BB: Carvedilol 3.125 mg BID (will  hold in setting of low CI)   Aldosterone antagonist: Spironolactone 25 mg; Potassium Chloride 80 mEq TID   SGLT2i: dapagliflozin 10 mg   Hydral/Nitrates: hydralazine 25 TID   SCD prophylaxis: ICD 11/2022   Statin: Atorvastatin 20 mg        DATE MAP CVP PAP PCWP Amina CO Amina CI SVR MVo2 Therapies Weight    08/31/2023   9 41/22/30 19 3.04 1.61       172 lbs    10/2022   12 76/35/50 38 3.25 1.72       169 lbs      LVAD/Transplant Evaluation Checklist  [] Labs (CBC, CMP, PT/INR, cystatin C, prealbumin, UA + micro)  [] Infectious (Hep A/B/C, HIV, treponema, HSV 1/2 IgG, CMV IgG, Toxo IgG, EBV IgG, varicella IgG, Quant gold, COVID vaccine/PCR)  [] Utox/nicotine and cotinine/PeTH   [] Immunocompatibility (last transfusion, ABO, HLA tissue typing, PRA)  [x] ECG, Echo - 08/31/2023: Severely dilated LV with global systolic function severely reduced, LVEF 18%, severe diffuse hypokinesis, RV with normal cavity size and normal global systolic function, RVEF 53%  [x] CPX 9/1/2023- Peak VO2 12.70 ml/kg/min; VE/VCO2 59.11; RER 0.99   [] 6MWT   [x] RHC - 8/31/2023: RA 9; PA 41/22/30; Amina CO/CI 3.04/1.61   [] CVTS consult  [] Social work consult  [] Palliative care consult  [] Neuropsych consult  [x] Nutrition consult  [x] CT Dental   [] Dental consult  [] Abd US + doppler  [x] Extremity US and ABIs  [x] Carotid US (if DM or ICM or >51yo)  [] PFTs  [] Dexa  [x] CT head non-contrast  [x] CT CAP non-contrast -  Sub-6 mm pulmonary nodules. Optional follow-up CT of the chest in  one year, if patient is high risk per Fleischner criteria  [] colonoscopy (>49 yo)  [] HCG  [] mammogram (>41 yo)  [] Pap test     #Type 2 DM   Most recent Hgb A1c 7.8 on 08/10/2023  - Detemir 30 U at bedtime  - [facility]        Tao Davis MD  [facility]  Department of Internal Medicine   Resident Physician  PGY-1  Pager: [phone]     ================================================================    Interval History/ROS:     NAEON. Endorses feeling lighter compared to yesterday. Denies chest pain or palpitations. She endorses progressively worsening SOB for the past 2-3 weeks, and worsening abdominal distension for the past month; both of which she still feels this morning at rest. She reports that all her fluid always goes to her abdomen rather than her legs during exacerbations.     Telemetry - sinus   In 1L, out 2.6L all urine. Weight 169 > 168 lbs today      Medications: Reviewed in EPIC.     Physical Exam:   Temp:  [97.5  F (36.4  C)-98.8  F (37.1  C)] 98.4  F (36.9  C)  Pulse:  [75-88] 86  Resp:  [16-20] 16  BP: (120-152)/() 152/91  SpO2:  [96 %-100 %] 98 %      GEN: No acute distress   HEENT: EOMI, no icterus, moist mucous membranes   CV: RRR, normal s1/s2, no murmurs. JVP mid neck.   CHEST: Normal work of breathing. Lungs CTAB, no wheezes or crackles.   ABD: Soft, Non-tender. Fluid wave and abdomenal distention present.    EXT: Warm and well-perfused, 1+ pitting LE edema  NEURO: Awake, alert, answering questions appropriately, motor grossly nonfocal  PSYCH: cooperative, affect appropriate    Data:   Latest Reference Range & Units 09/02/23 07:18   WBC 4.0 - 11.0 10e3/uL 6.7   Hemoglobin 11.7 - 15.7 g/dL 15.6   Hematocrit 35.0 - 47.0 % 45.6   Platelet Count 150 - 450 10e3/uL 234   RBC Count 3.80 - 5.20 10e6/uL 5.51 (H)   MCV 78 - 100 fL 83   MCH 26.5 - 33.0 pg 28.3   MCHC 31.5 - 36.5 g/dL 34.2      Latest Reference Range & Units 09/01/23 21:19   Color Urine Colorless, Straw, Light Yellow, Yellow  Light Yellow   Appearance Urine Clear  Slightly Cloudy !   Glucose Urine Negative mg/dL Negative   Bilirubin Urine Negative  Negative   Ketones Urine Negative mg/dL Negative   Specific Gravity Urine 1.003 -  1.035  1.011   pH Urine 5.0 - 7.0  5.0   Protein Albumin Urine Negative mg/dL Negative   Urobilinogen mg/dL Normal, 2.0 mg/dL Normal   Nitrite Urine Negative  Negative   Blood Urine Negative  Negative   Leukocyte Esterase Urine Negative  Small !   WBC Urine <=5 /HPF 5   RBC Urine <=2 /HPF 1   Squamous Epithelial /HPF Urine <=1 /HPF 6 (H)   Transitional Epi <=1 /HPF <1   Mucus Urine None Seen /LPF Present !   Hyaline Casts <=2 /LPF 7 (H)   Granular Casts None Seen /LPF 1 (H)       Imaging:    Chest CT 9/1/2023  IMPRESSION:   1. No acute or suspicious abnormalities in the chest, abdomen, or  pelvis.  2. Sub-6 mm pulmonary nodules. Optional follow-up CT of the chest in  one year, if patient is high risk per Fleischner criteria    CT Head w/o contrast 9/1/2023  No suspicious intracranial finding. Subtle patchy  hypodensities in the left parietal subcortical white matter, likely  related to gliosis from underlying chronic small vessel disease.    CT Dental wo contrast 9/1/2023  Multifocal scattered dental caries and dental  reconstruction. Most notably there is a large cavity in the left  maxillary first premolar tooth, associated with periapical lucency and  dehiscence of the adjacent outer maxillary cortical bone, compatible  with periapical periodontitis.    CXR 9/1/2023  ICD in place.    US BI Doppler 9/1/2023  1. RIGHT:       A. Resting MARLENE is normal, 1.18.     2. LEFT:       A. Resting MARLENE is normal, 1.11.      Upper Extremity US 9/1/2023  RIGHT:  Subclavian artery, medial: 64/0 cm/s, triphasic, 10.6 mm  Subclavian artery, mid: 62/0 cm/s, triphasic, 8.7 mm  Subclavian artery, lateral: 62/0 cm/s, triphasic, 9.1 mm     Axillary artery: 56/0 cm/s, triphasic, 8.2 mm     LEFT:  Subclavian artery, medial: 76/0 cm/s, triphasic, 8.5 mm  Subclavian artery, mid: 105/0 cm/s, triphasic, 8.6 mm  Subclavian artery, lateral: 71/0 cm/s, triphasic, 7.9 mm     Axillary artery: 69/0 cm/s, triphasic, 8.1 mm                                                                       IMPRESSION: Patent bilateral subclavian and axillary arteries with  measurements as in the report.    Carotid US 9/1/2023  RIGHT ICA: Less than 50% diameter narrowing by grayscale imaging  and sonographic velocity criteria.     LEFT ICA:  Less than 50% diameter narrowing by grayscale imaging  and sonographic velocity criteria

## 2023-09-02 NOTE — PROGRESS NOTES
"Goal outcome evaluation:  Time: 1900 - 0700  Plan of care reviewed with: Patient  Overall patient progress: No change  VS BP (!) 152/91 (BP Location: Left arm)   Pulse 86   Temp 98.4  F (36.9  C) (Oral)   Resp 16   Ht 1.702 m (5' 7.01\")   Wt 76.9 kg (169 lb 8.5 oz)   SpO2 98%   BMI 26.55 kg/m        Activity: UAL - ambulated to the bathroom.  Neuros: Alert and oriented to person, place, situation and time. Calls appropriately. Able to make needs known. Clear and appropriate speech. Follows instructions.  Psychosocial Assessment: Cooperative and interacts appropriately with staff. Cardiac: NSR. Heart rate is within normal limits and regular. Peripheral pulses are palpable and equal bilaterally.  Respiratory: ARNOLD, on RA. Lung sounds diminished.  GI/: Reports no nausea, vomiting, or abdominal pain. Bowel sounds are present in all quadrants, no BM this shift. Reports no difficulty or pain with urination.   Diet: 2 gram NA diet with 2L fluid restriction, no complaints of nausea, vomiting or abdominal discomfort.  Skin/Incisions: Skin is warm and dry, no new deficits noted.  Lines/Drains: RPIV SL  Labs: K+, Mag, Phos replacement protocol, replaced K+, recheck in the AM.  Pain/Nausea: No c/o of pain or nausea.  New changes this shift:None  Plan: Continue POC, notify provider of changes    "

## 2023-09-02 NOTE — PHARMACY-CONSULT NOTE
Pharmacy Stroke Code Response    Pharmacist responded as part of the Stroke Code Team activation to patient care area 4E.      Patient weight (in kg): 75.  Weight was obtained from RX STROKE WEIGHT: daily inpatient weight.(weighed again at bedside)    The Stroke Team determined that the patient was a candidate for IV tenecteplase therapy and requested that tenecteplase be made at 1120.    Dose of tenecteplase: A total dose of 18.75 mg was calculated (0.25 mg/kg). This is to be given as a bolus over 5 seconds. Calculation double check performed by: Petra Jewell.    The tenecteplase dose was handed off to nursing at 1138, slight delay with patient hesitance.   The tenecteplase dose was administered at 1142.  These were administered on 4E 8320.     The neurology team entered the tenecteplase orders into [facility].      Jaylene Alarcon RPH on 9/2/2023 at 11:57 AM

## 2023-09-02 NOTE — PROGRESS NOTES
Physician Attestation   Jamilah was seen and evaluated by me on 09/02/23.  She was in critical condition as the result of acute ischemic stroke post thrombolysis  Her condition is now Critical.      The acute issues managed by me today include  acute ischemic stroke post thromblolysis   Supportive interventions provided and/or ordered by me include frequent neurocheck. Etiology is unclear at this time. Pending MRI. Fluid management, electrolyte and nutrition management. Cardiology heart failure team to continue to follow  I agree with the resident/fellow's assessment and plan of care.     Total Critical Care time spent by me, excluding procedures, was  57 minutes    Michael Edwards MD    Neurocritical Care Progress Note    Reason for critical care admission: TNK administration  Admitting Team: On the floor, was cards. Now NCC.   Date of Service:  09/02/2023  Date of Admission:  8/31/2023  Hospital Day: 3    Assessment/Plan  Jamilah Jiménez is a 47-year-old female with HFrEF, Class III, Stage C-D, DM2, HTN, PE admitted following RHC with CI 1.61, who has been admitted since 8/31 for further evaluation and consideration for advanced heart failure therapies. She developed acute-onset expressive aphasia and right-sided weakness/numbness on 9/2, with LKW at 1040. Initial NIHSS of 7 for a right homonomous hemianopia, RUE and RLE drift, sensory loss over the right hemibody, mild expressive aphasia, and mild dysarthria. CTH was negative for acute bleeding, but did show an area of hypodensity in the left parietal lobe consistent with old stroke. CTA was negative for LVO. Given a clinical syndrome consistent with acute ischemic stroke localizing to the left frontal and parietal lobes, and an absence of contraindications to thrombolytic therapy, tenecteplase (TNK) was administered at 1142 on 9/2. Patient was transferred to the neurocritical care unit for further management. Stroke mechanism is undetermined at this time, but  "patient does have multiple risk factors for cardioembolic processes given her severely reduced EF.     Neuro  #Concern for acute ischemic stroke of the left cerebral hemisphere s/p TNK administration, uncertain mechanism at this time  - Neurochecks and vital signs per post-thrombolytic orders and monitor closely for any evidence of CNS hemorrhage, bleeding, or orolingual angioedema  - Permissive HTN x 24 hours: BP Goal < 180/105  - Hold all antithrombotic and anticoagulant medications for 24 hrs post-thrombolytic  - Hold pharmacologic VTE prophylaxis for 24 hrs post-thrombolytic  - Statin:  Atorvastatin 80 mg QD  - Repeat Head CT 24 hrs post-thrombolytic  - MRI Brain with and without contrast  - TTE (with Bubble Study if age 60 yrs or less)  - Telemetry, EKG  - Bedside Glucose Monitoring  - A1c (9/2) = 9.4%  - LDL (9/2) = 137  - Troponin x 3  - PT/OT  - Stroke Education  - Smoking Cessation  - Depression Screen  - Apnea screening questions  - Euthermia, Euglycemia  - NPO pending SLP evaluation    #Analgesics & sedation  RASS goal:  -No sedation required at this time.     CV  #HFrEF (LVEF 18%), non-ischemic cardiomyopathy (genetic) s/p ICD, ACC/AHA Stage D, NYHA Symptom Class IV  #HTN   #HLD   Per primary team , \"patient with progressively worsening dyspnea, edema, overall functional status over the last ~ 2 years. 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 Has been on diuretics, GDMT, has ICD in place.\"  -Was being diuresed with torsemide 100 mg bid, with reasonable response but continues to be hypervolemic.   -Cardiac monitoring  -SBP goal < 180 mmHg  -PRN Labetalol and Hydralazine  -Guideline directed medical therapy:   -ACE-I/ARB/ARNi: Entresto  mg BID. Held in the setting of low BP during/before code stroke called 9/2/2023.   -BB: Carvedilol 3.125 mg BID (will  hold in setting of low CI)   -Aldosterone antagonist: Spironolactone 25 mg; Potassium Chloride 80 mEq TID " "  -SGLT2i: dapagliflozin 10 mg   -Hydral/Nitrates: hydralazine 25 TID   -SCD prophylaxis: ICD 2022   -Statin: Atorvastatin 20 mg      Resp  #No acute needs  Oxygen/vent: Room air  -Continuous pulse ox  -Maintain O2 saturations greater than 92%    GI  #No acute needs  Diet: as previously ordered  Last BM:  GI prophylaxis: Not indicated  -Bowel regimen: scheduled senna-docusate and Miralax    Renal/  #No acute needs  -Daily BMP  -IV fluids: none at this time  -Electrolyte replacement protocol    Endo  #Type 2 DM   Most recent Hgb A1c 7.8 on 08/10/2023  - Detemir 30 U at bedtime  - MSSI    Heme  #No acute needs  -Daily CBC  -Hgb goal >7, plt goal >50k  -Transfuse to meet Hgb and plt goals    ID  #No acute needs  -Daily CBC  -Follow temperature curve    ICU Checklist  Lines/tubes/drains:  FEN:  PPX: DVT - SCDs.  Code: FULL  Dispo: ICU - NCC    Clinically Significant Risk Factors        # Hypokalemia: Lowest K = 3.2 mmol/L in last 2 days, will replace as needed  # Hyperkalemia: Highest K = 5.4 mmol/L in last 2 days, will monitor as appropriate           # Hypertension: Noted on problem list  # Chronic heart failure with reduced ejection fraction: last echo with EF <40%      # DMII: A1C = 8.8 % (Ref range: <5.7 %) within past 6 months   # Overweight: Estimated body mass index is 26.31 kg/m  as calculated from the following:    Height as of this encounter: 1.702 m (5' 7.01\").    Weight as of this encounter: 76.2 kg (168 lb)., PRESENT ON ADMISSION      # ICD device present          The patient was seen and discussed with the NCC attending, Dr. Edwards.    Ramakrishna Mondragon MD  Neurology, PGY2    24 Hour Vital Signs Summary:  Temp: 97.9  F (36.6  C) Temp  Min: 97.5  F (36.4  C)  Max: 98.7  F (37.1  C)  Resp: 10 Resp  Min: 10  Max: 20  SpO2: 97 % SpO2  Min: 95 %  Max: 99 %  Pulse: 81 Pulse  Min: 78  Max: 101    No data recorded  BP: 105/82 Systolic (24hrs), Av , Min:85 , Max:152   Diastolic (24hrs), Av, Min:63, " "Max:111      Respiratory monitoring:   Resp: 10      I/O last 3 completed shifts:  In: 555 [P.O.:540; I.V.:15]  Out: 1910 [Urine:1910]    Current Medications:   atorvastatin  20 mg Oral or Feeding Tube At Bedtime    [START ON 9/3/2023] dapagliflozin  10 mg Oral or Feeding Tube QAM    hydrALAZINE  25 mg Oral or Feeding Tube TID    insulin aspart  1-7 Units Subcutaneous TID AC    insulin aspart  1-5 Units Subcutaneous At Bedtime    insulin detemir  30 Units Subcutaneous At Bedtime    potassium chloride  60 mEq Oral or Feeding Tube TID    sacubitril-valsartan  1 tablet Oral BID    [START ON 9/3/2023] sertraline  100 mg Oral or Feeding Tube Daily    sodium chloride (PF)  3 mL Intracatheter Q8H    [START ON 9/3/2023] spironolactone  50 mg Oral or Feeding Tube QAM    torsemide  100 mg Oral or Feeding Tube BID       PRN Medications:  acetaminophen, - MEDICATION INSTRUCTIONS -, glucose **OR** dextrose **OR** glucagon, HOLD MEDICATION, hydrALAZINE, labetalol, lidocaine 4%, lidocaine (buffered or not buffered), - MEDICATION INSTRUCTIONS -, menthol (Topical Analgesic) 2.5%, ondansetron, prochlorperazine, BETA BLOCKER NOT PRESCRIBED, sodium chloride (PF)    Infusions:   - MEDICATION INSTRUCTIONS -      - MEDICATION INSTRUCTIONS -      BETA BLOCKER NOT PRESCRIBED         Allergies   Allergen Reactions    Aspirin Hives     Told nursing on 6/1/22 she breaks out in hives if she takes aspirin.    Ibuprofen Hives    Lisinopril Cough    Oxycodone-Acetaminophen Nausea and Vomiting       Physical Examination:  Vitals: /82   Pulse 81   Temp 97.9  F (36.6  C) (Oral)   Resp 10   Ht 1.702 m (5' 7.01\")   Wt 76.2 kg (168 lb)   SpO2 97%   BMI 26.31 kg/m    General: Adult female patient, lying in bed  HEENT: Normocephalic, atraumatic, no icterus, oral cavity/oropharynx pink and moist  Cardiac: RRR  Pulm: unlabored, expansion symmetric, no retractions or use of accessory muscles  Abdomen: Soft, non-distended, bowel sounds " present  Extremities: Edema  Skin: No rash or lesion  Psych: Calm and cooperative  Neuro:  Mental Status:  alert, oriented x 3, follows commands, mild-moderate aphasia = impaired fluency and repetition (only able to repeat the 1st word of a phrase) but preserved reception (following commands), orientation (correctly oriented to name, month & age), and naming (watch & pen including parts).  Cranial Nerves:  PERRL, EOMI with normal smooth pursuit, facial movements symmetric, hearing not formally tested but intact to conversation, palate elevation symmetric and uvula midline, shoulder shrug strong bilaterally, tongue protrusion midline; Right homonymous hemianopia, Right facial numbness, mild dysarthria  Motor:  normal muscle tone and bulk, no abnormal movements, able to move all limbs spontaneously; no drift & full 5/5 strength on Left side, mild drift & diminished 4/5 strength on Right extremities with functional/give-away comoponent  Reflexes:   No assessed  Sensory:  light touch sensation intact throughout upper and lower extremities but diminished on Right compared to Left; Right extinction with bilateral simultaneous tactile stimulation  Coordination:  normal finger-to-nose and heel-to-shin bilaterally without dysmetria  Station/Gait:  deferred         NIHSS  1a. Level of Consciousness 0-->Alert, keenly responsive   1b. LOC Questions 0-->Answers both questions correctly   1c. LOC Commands 0-->Performs both tasks correctly   2.   Best Gaze 0-->Normal   3.   Visual 2-->Complete hemianopia (Right side)   4.   Facial Palsy 0-->Normal symmetrical movements   5a. Motor Arm, Left 0-->No drift, limb holds 90 (or 45) degrees for full 10 secs   5b. Motor Arm, Right 1-->Drift, limb holds 90 (or 45) degrees, but drifts down before full 10 secs, does not hit bed or other support   6a. Motor Leg, Left 0-->No drift, leg holds 30 degree position for full 5 secs   6b. Motor Leg, right 1-->Drift, leg falls by the end of the 5-sec  period but does not hit bed   7.   Limb Ataxia 0-->Absent   8.   Sensory 1-->Mild-to-moderate sensory loss, patient feels pinprick is less sharp or is dull on the affected side, or there is a loss of superficial pain with pinprick, but patient is aware of being touched   9.   Best Language 1-->Mild-to-moderate aphasia, some obvious loss of fluency or facility of comprehension, without significant limitation on ideas expressed or form of expression. Reduction of speech and/or comprehension, however, makes conversation. . . (see row details) (Decreased fluency & impaired repetition)   10. Dysarthria 1-->Mild-to-moderate dysarthria, patient slurs at least some words and, at worst, can be understood with some difficulty   11. Extinction and Inattention  0-->No abnormality   Total 7 (09/02/23 1141)       Labs and Imaging:      All relevant imaging and laboratory values personally reviewed.

## 2023-09-02 NOTE — PROVIDER NOTIFICATION
Stroke Code Nurse-Responder Note    Arrival Time to Stroke Code: 10:57    Stroke Code Team interventions:  Tenecteplase IV given as ordered by      Dr. Hal Birmingham, Neurology.  Hung and double checked by                         JULIO Worthy RN and Jaylene Alarcon [facility].      ED/Bedside Nurse providing handoff:  Sam Maxwell, ESDRAS RN provided handoff to Eloisa Mcfarlane RN, [place] and Angie Jackson RN, Bradley.    Time left for CT:  11:13    Time arrived to next location (ED/Unit/IR): Transferred from CT to 4E at 11:29    ED/Bedside Nurse given handoff (name/time): Eloisa Mcfarlane RN, [place]  and Angie Jackson RN, Bradley, provided handoff to JULIO Worthy RN at 11:30.    Juany Mcfarlane, RN

## 2023-09-02 NOTE — PROGRESS NOTES
09/02/23 1531   Appointment Info   Signing Clinician's Name / Credentials (SLP) Yenni Nicole MA CCC-SLP   General Information   Onset of Illness/Injury or Date of Surgery 08/31/23   Referring Physician Ramakrishna Mondragon MD   Patient/Family Therapy Goal Statement (SLP) None stated   Pertinent History of Current Problem Pt is a 47 year old female with PMH of HFrEF (EF = 17%), Class III, Stage C-D, DM2, HTN, HLD, PE admitted following RHC with CI 1.61 (8/31/2023) for further evaluation and consideration for advanced heart failure therapies. Stroke code activated 9/2/2023 @ 1101 for acute onset dizziness, numbness, weakness & difficulty speaking. LKW @ 1040. 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 Initial NIHSS = 7.     After verifying no contraindications, patient was consented for TNK. There was some delay in delivering TNK due to patient hesitation. Repeat exam showed some possible fluctuation in patient's Right-sided weakness with some suspected functional component. However, her aphasia, dysarthria and homonymous hemianopsia remained constant. Therefore recommendation remained unchanged to deliver TNK. After some deliberation, patient eventually agreed and received TNK @ 1142. Clinical swallow eval completed per MD order.   General Observations Upon SLP arrival, pt positioned upright in bed, awake, feeling nauseous, but willing to partiicpate.   Type of Evaluation   Type of Evaluation Swallow Evaluation   Oral Motor   Oral Musculature generally intact   Structural Abnormalities none present   Mucosal Quality good   Dentition (Oral Motor)   Dentition (Oral Motor) natural dentition;adequate dentition   Facial Symmetry (Oral Motor)   Facial Symmetry (Oral Motor) WNL   Lip Function (Oral Motor)   Lip Range of Motion (Oral Motor) WNL   Tongue Function (Oral Motor)   Tongue ROM (Oral  Motor) WNL   Jaw Function (Oral Motor)   Jaw Function (Oral Motor) WNL   Cough/Swallow/Gag Reflex (Oral Motor)   Volitional Throat Clear/Cough (Oral Motor) WNL   Vocal Quality/Secretion Management (Oral Motor)   Vocal Quality (Oral Motor) hoarse   Secretion Management (Oral Motor)   (Frequent throat clearing, endorses phlegm in throat)   General Swallowing Observations   Past History of Dysphagia No hx of dysphagia per chart review and pt report.   Respiratory Support (General Swallowing Observations) none   Current Diet/Method of Nutritional Intake (General Swallowing Observations, NIS) NPO   Swallowing Evaluation Clinical swallow evaluation   Clinical Swallow Evaluation   Feeding Assistance minimal assistance required   Clinical Swallow Evaluation Textures Trialed thin liquids;pureed;soft & bite-sized;solid foods   Clinical Swallow Eval: Thin Liquid Texture Trial   Mode of Presentation, Thin Liquids straw;cup;fed by clinician   Volume of Liquid or Food Presented 4 oz   Oral Phase of Swallow WFL   Pharyngeal Phase of Swallow intact   Diagnostic Statement No overt s/sx of aspiration.   Clinical Swallow Evaluation: Puree Solid Texture Trial   Mode of Presentation, Puree spoon;self-fed   Volume of Puree Presented 4 tbsp   Oral Phase, Puree WFL   Pharyngeal Phase, Puree intact   Diagnostic Statement No overt s/sx of aspiration.   Clinical Swallow Eval: Soft & Bite Sized   Mode of Presentation spoon;self-fed   Volume Presented 4 tbsp   Oral Phase   (Prolonged mastication)   Oral Residue mid posterior tongue   Pharyngeal Phase intact   Diagnostic Statement No overt s/sx of aspiration.   Clinical Swallow Evaluation: Solid Food Texture Trial   Mode of Presentation self-fed   Volume Presented 1/2 cracker   Oral Phase   (Prolonged mastication)   Oral Residue mid posterior tongue   Pharyngeal Phase intact   Diagnostic Statement No overt s/sx of aspiration.   Esophageal Phase of Swallow   Patient reports or presents with  symptoms of esophageal dysphagia No   Swallowing Recommendations   Diet Consistency Recommendations soft & bite-sized (level 6);thin liquids (level 0)   Supervision Level for Intake close supervision needed   Mode of Delivery Recommendations bolus size, small;slow rate of intake   Swallowing Maneuver Recommendations alternate food and liquid intake   Monitoring/Assistance Required (Eating/Swallowing) check mouth frequently for oral residue/pocketing;cue for finger/lingual sweep if oral pocketing present;stop eating activities when fatigue is present;monitor for cough or change in vocal quality with intake   Recommended Feeding/Eating Techniques (Swallow Eval) maintain upright sitting position for eating;provide assist with feeding;set-up and prepare tray;minimize distractions during oral intake   Medication Administration Recommendations, Swallowing (SLP) As tolerated   Instrumental Assessment Recommendations instrumental evaluation not recommended at this time   General Therapy Interventions   Planned Therapy Interventions Dysphagia Treatment   Dysphagia treatment Oropharyngeal exercise training;Modified diet education;Instruction of safe swallow strategies   Clinical Impression   Criteria for Skilled Therapeutic Interventions Met (SLP Eval) Yes, treatment indicated   SLP Diagnosis Mild oropharyngeal dysphagia   Problem List (SLP) Below baseline swallow mechanism   Risks & Benefits of therapy have been explained evaluation/treatment results reviewed;care plan/treatment goals reviewed;current/potential barriers reviewed;risks/benefits reviewed;participants voiced agreement with care plan;participants included;patient   Clinical Impression Comments Clinical swallow eval completed per MD order. Pt presents w/ mild oropharyngeal dyspahgia in setting of R sided weakness, slurred speech, and fatigue. Oral mech exam remarkable for mild dysarthria, R sided facial numbess, and hoarse vocal quality. No facial droop  appreciated. Pt w/ frequent throat clearing and endorsing phelgm in throat. Pt assessed w/ ice chips, thin liquids via cup and straw, puree, semisolids, and solids. Oral phase c/b prolonged mastication. Mild oral residue noted. Pharyngeal phase unremarkable, no overt s/sx of aspiration across trials. Suspect expressive aphasia. Recommend soft and bite sized diet (IDDSI 6) and thin liquids. Meds OK as tolerated. Ensure pt is fully upright and alert, taking small sips/bites at a slow rate. SLP to follow.   SLP Total Evaluation Time   Eval: oral/pharyngeal swallow function, clinical swallow Minutes (08714) 12   SLP Discharge Planning   SLP Plan Diet tolerance/upgrade, monitor dysarthria and aphasia   SLP Discharge Recommendation [facility] patient will benefit from intensive, interdisciplinary therapy.  Anticipate will be able to tolerate 3 hours of therapy per day;home with outpatient speech therapy   SLP Rationale for DC Rec Dysphagia, aphasia, dysarthria   SLP Brief overview of current status  Recommend soft and bite sized diet (IDDSI 6) and thin liquids. Meds OK as tolerated. Ensure pt is fully upright and alert, taking small sips/bites at a slow rate. SLP to follow.   Total Session Time   Total Session Time (sum of timed and untimed services) 17

## 2023-09-02 NOTE — CONSULTS
"[facility]    Stroke Consult Note    Reason for Consult: Stroke Code     Chief Complaint: Acute onset dizziness, numbness, weakness & difficulty speaking      HPI  Jamilah Jiménez is a 47 year old female with PMH of HFrEF (EF = 17%), Class III, Stage C-D, DM2, HTN, HLD, PE admitted following RHC with CI 1.61 (8/31/2023) for further evaluation and consideration for advanced heart failure therapies. Stroke code activated 9/2/2023 @ 1101 for acute onset dizziness, numbness, weakness & difficulty speaking. LKW @ 1040.     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 Initial NIHSS = 7.    After verifying no contraindications, patient was consented for TNK. There was some delay in delivering TNK due to patient hesitation. Repeat exam showed some possible fluctuation in patient's Right-sided weakness with some suspected functional component. However, her aphasia, dysarthria and homonymous hemianopsia remained constant. Therefore recommendation remained unchanged to deliver TNK. After some deliberation, patient eventually agreed and received TNK @ 1142.    Imaging Findings  -CTH: negative for bleed  -CTA: negative for occlusion    Intravenous Thrombolysis  Delivered @ 1142    Endovascular Treatment  Not initiated due to absence of proximal vessel occlusion    Impression   #Acute ischemic stroke of Left MCA territory due to undetermined etiology   The mechanism of the patient's stroke is most likely cardio-embolism given her complicated cardiac history. Final determination pending completed stroke workup.    Recommendations  - Use orderset: \"Ischemic Stroke Post-Thrombolytics/Thrombectomy ICU Admission\"  - Neurochecks and vital signs per post-thrombolytic orders and monitor closely for any evidence of CNS hemorrhage, bleeding, or orolingual " "angioedema  - Permissive HTN x 24 hours: BP Goal < 220 / 110  - Hold all antithrombotic and anticoagulant medications for 24 hrs post-thrombolytic  - Hold pharmacologic VTE prophylaxis for 24 hrs post-thrombolytic  - Statin:  Atorvastatin 80 mg QD  - Repeat Head CT 24 hrs post-thrombolytic  - MRI Brain with and without contrast  - TTE (with Bubble Study if age 60 yrs or less)  - Telemetry, EKG  - Bedside Glucose Monitoring  - A1c (9/2) = 9.4%  - LDL (9/2) = 137  - Troponin x 3  - PT/OT  - Stroke Education  - Smoking Cessation  - Depression Screen  - Apnea screening questions  - Euthermia, Euglycemia  - NPO pending SLP evaluation    Patient Follow-up     - final recommendation pending work-up    Thank you for this consult. Patient will be admitted to the [facility]. Stroke Team completed hand-off to the NCC Team.     This patient was discussed with the stroke attending faculty, Donald Patino.    Hal Birmingham DO  Neurology Resident PGY3  09/02/2023 12:30 PM  Stroke ASCOM: *[id number]  Stroke Pager: [phone]    ______________________________________________________    Clinically Significant Risk Factors        # Hypokalemia: Lowest K = 3.2 mmol/L in last 2 days, will replace as needed             # Hypertension: Noted on problem list    # Chronic heart failure with reduced ejection fraction: last echo with EF <40%      # DMII: A1C = 8.3 % (Ref range: <5.7 %) within past 6 months   # Overweight: Estimated body mass index is 26.31 kg/m  as calculated from the following:    Height as of this encounter: 1.702 m (5' 7.01\").    Weight as of this encounter: 76.2 kg (168 lb)., PRESENT ON ADMISSION      # ICD device present         Past Medical History   Past Medical History:   Diagnosis Date    Anxiety     Diabetes (H)     TYPE II    Hypertension      Past Surgical History   Past Surgical History:   Procedure Laterality Date    APPENDECTOMY      INCIDENTAL WITH C SECTION    CV CARDIAC CATHERIZATION      CV RIGHT HEART CATH " MEASUREMENTS RECORDED N/A 8/31/2023    Procedure: Heart Cath Right Heart Cath;  Surgeon: Alexander Stevens MD;  Location:  HEART CARDIAC CATH LAB    EP ICD      EYE SURGERY Left     DETACHED RETINA    GYN SURGERY      TUBAL LIGATION    ORTHOPEDIC SURGERY      SHOULDER SURGERY    ORTHOPEDIC SURGERY      KNEE ARTHROSCOPY     Medications   Home Meds  Prior to Admission medications    Medication Sig Start Date End Date Taking? Authorizing Provider   atorvastatin (LIPITOR) 20 MG tablet Take 20 mg by mouth At Bedtime 11/23/22  Yes Reported, Patient   benzonatate (TESSALON) 200 MG capsule Take 200 mg by mouth 3 times daily as needed for cough   Yes Unknown, Entered By History   dapagliflozin (FARXIGA) 10 MG TABS tablet Take 10 mg by mouth every morning 11/23/22 11/28/23 Yes Reported, Patient   digoxin (LANOXIN) 125 MCG tablet Take 250 mcg by mouth daily   Yes Unknown, Entered By History   folic acid (FOLVITE) 1 MG tablet Take 1 mg by mouth daily 6/16/22  Yes Reported, Patient   insulin aspart (NOVOLOG FLEXPEN) 100 UNIT/ML pen Inject 1-10 Units Subcutaneous 4 times daily (with meals and nightly) SLIDING SCALE   Yes Reported, Patient   insulin detemir (LEVEMIR PEN) 100 UNIT/ML pen Inject 30 Units Subcutaneous At Bedtime   Yes Reported, Patient   metolazone (ZAROXOLYN) 2.5 MG tablet Take 2.5 mg by mouth daily as needed 11/9/22  Yes Reported, Patient   nitroGLYcerin (NITROSTAT) 0.4 MG sublingual tablet Place 0.4 mg under the tongue every 5 minutes as needed for chest pain For chest pain place 1 tablet under the tongue every 5 minutes for 3 doses. If symptoms persist 5 minutes after 1st dose call 911.   Yes Unknown, Entered By History   potassium chloride ER (KLOR-CON M) 20 MEQ CR tablet Take 60 mEq by mouth 3 times daily 10/17/22  Yes Reported, Patient   sacubitril-valsartan (ENTRESTO)  MG per tablet Take 1 tablet by mouth 2 times daily   Yes Reported, Patient   sertraline (ZOLOFT) 100 MG tablet Take 100 mg by  mouth daily   Yes Unknown, Entered By History   spironolactone (ALDACTONE) 25 MG tablet Take 50 mg by mouth every morning 11/9/22  Yes Reported, Patient   torsemide (DEMADEX) 20 MG tablet Take 100 mg by mouth 2 times daily 11/9/22  Yes Reported, Patient   Vitamin D, Cholecalciferol, 10 MCG (400 UNIT) TABS Take 400 Units by mouth daily   Yes Unknown, Entered By History   carvedilol (COREG) 3.125 MG tablet Take 3.125 mg by mouth 2 times daily 6/15/22 6/20/23  Reported, Patient       Scheduled Meds   atorvastatin  20 mg Oral At Bedtime    dapagliflozin  10 mg Oral QAM    hydrALAZINE  25 mg Oral TID    insulin aspart  1-7 Units Subcutaneous TID AC    insulin aspart  1-5 Units Subcutaneous At Bedtime    insulin detemir  30 Units Subcutaneous At Bedtime    potassium chloride ER  60 mEq Oral TID    [Held by provider] sacubitril-valsartan  1 tablet Oral BID    sertraline  100 mg Oral Daily    sodium chloride (PF)  3 mL Intracatheter Q8H    spironolactone  50 mg Oral QAM    tenecteplase  19 mg Intravenous Once    torsemide  100 mg Oral BID       Infusion Meds   - MEDICATION INSTRUCTIONS -      - MEDICATION INSTRUCTIONS -      BETA BLOCKER NOT PRESCRIBED         PRN Meds  acetaminophen, - MEDICATION INSTRUCTIONS -, glucose **OR** dextrose **OR** glucagon, HOLD MEDICATION, hydrALAZINE, labetalol, lidocaine 4%, lidocaine (buffered or not buffered), - MEDICATION INSTRUCTIONS -, menthol (Topical Analgesic) 2.5%, ondansetron, BETA BLOCKER NOT PRESCRIBED, sodium chloride (PF)    Allergies   Allergies   Allergen Reactions    Aspirin Hives     Told nursing on 6/1/22 she breaks out in hives if she takes aspirin.    Ibuprofen Hives    Lisinopril Cough    Oxycodone-Acetaminophen Nausea and Vomiting     Family History   History reviewed. No pertinent family history.  Social History   Social History     Tobacco Use    Smoking status: Never    Smokeless tobacco: Never   Vaping Use    Vaping Use: Never used   Substance Use Topics    Alcohol  use: Not Currently    Drug use: Not Currently          PHYSICAL EXAMINATION  Temp:  [97.5  F (36.4  C)-98.8  F (37.1  C)] 98.2  F (36.8  C)  Pulse:  [78-90] 90  Resp:  [12-18] 12  BP: ()/() 126/83  SpO2:  [97 %-100 %] 99 %     Neurologic  Mental Status:  alert, oriented x 3, follows commands, mild-moderate aphasia = impaired fluency and repetition (only able to repeat the 1st word of a phrase) but preserved reception (following commands), orientation (correctly oriented to name, month & age), and naming (watch & pen including parts).  Cranial Nerves:  PERRL, EOMI with normal smooth pursuit, facial movements symmetric, hearing not formally tested but intact to conversation, palate elevation symmetric and uvula midline, shoulder shrug strong bilaterally, tongue protrusion midline; Right homonymous hemianopia, Right facial numbness, mild dysarthria  Motor:  normal muscle tone and bulk, no abnormal movements, able to move all limbs spontaneously; no drift & full 5/5 strength on Left side, mild drift & diminished 4/5 strength on Right extremities with functional/give-away comoponent  Reflexes:   No assessed  Sensory:  light touch sensation intact throughout upper and lower extremities but diminished on Right compared to Left; Right extinction with bilateral simultaneous tactile stimulation  Coordination:  normal finger-to-nose and heel-to-shin bilaterally without dysmetria  Station/Gait:  deferred    Dysphagia Screen  Failed screening - Strict NPO pending SLP evaluation  09/02/2023 1120    Stroke Scales    NIHSS  1a. Level of Consciousness 0-->Alert, keenly responsive   1b. LOC Questions 0-->Answers both questions correctly   1c. LOC Commands 0-->Performs both tasks correctly   2.   Best Gaze 0-->Normal   3.   Visual 2-->Complete hemianopia (Right side)   4.   Facial Palsy 0-->Normal symmetrical movements   5a. Motor Arm, Left 0-->No drift, limb holds 90 (or 45) degrees for full 10 secs   5b. Motor Arm, Right  1-->Drift, limb holds 90 (or 45) degrees, but drifts down before full 10 secs, does not hit bed or other support   6a. Motor Leg, Left 0-->No drift, leg holds 30 degree position for full 5 secs   6b. Motor Leg, right 1-->Drift, leg falls by the end of the 5-sec period but does not hit bed   7.   Limb Ataxia 0-->Absent   8.   Sensory 1-->Mild-to-moderate sensory loss, patient feels pinprick is less sharp or is dull on the affected side, or there is a loss of superficial pain with pinprick, but patient is aware of being touched   9.   Best Language 1-->Mild-to-moderate aphasia, some obvious loss of fluency or facility of comprehension, without significant limitation on ideas expressed or form of expression. Reduction of speech and/or comprehension, however, makes conversation. . . (see row details) (Decreased fluency & impaired repetition)   10. Dysarthria 1-->Mild-to-moderate dysarthria, patient slurs at least some words and, at worst, can be understood with some difficulty   11. Extinction and Inattention  0-->No abnormality   Total 7 (09/02/23 1141)       Modified [place] Score (Pre-morbid)  0-No deficits    Imaging  I personally reviewed all imaging; relevant findings per HPI.     Lab Results Data   CBC  Recent Labs   Lab 09/02/23  0718 08/31/23  0653   WBC 6.7 5.9   RBC 5.51* 4.95   HGB 15.6 13.8   HCT 45.6 41.2    214     Basic Metabolic Panel    Recent Labs   Lab 09/02/23  1132 09/02/23  1031 09/02/23  0826 09/02/23  0718 09/01/23  2230 09/01/23  2021 09/01/23  1110 09/01/23  0717 08/31/23  1458 08/31/23  0704   NA  --   --   --  136  --   --   --  136  --  140   POTASSIUM  --   --   --  3.2*  3.2*  --  3.3*  --  3.3*  --  3.4   CHLORIDE  --   --   --  99  --   --   --  100  --  104   CO2  --   --   --  19*  --   --   --  22  --  24   BUN  --   --   --  36.9*  --   --   --  27.2*  --  17.0   CR  --   --   --  1.52*  --   --   --  1.22*  --  1.02*   * 194* 206* 213*  213*   < >  --    < > 233*   <  > 178*   MICHEL  --   --   --  9.9  --   --   --  9.5  --  8.3*    < > = values in this interval not displayed.     Liver Panel  Recent Labs   Lab 09/02/23  0718 09/01/23  0717   PROTTOTAL 8.5* 8.2   ALBUMIN 4.5 4.3   BILITOTAL 1.0 1.2   ALKPHOS 93 94   AST 23 17   ALT 13 7     INR    Recent Labs   Lab Test 09/02/23  0718 08/31/23  0704   INR 1.05 1.05      Lipid Profile    Recent Labs   Lab Test 09/02/23  0718   CHOL 210*   HDL 42*   *   TRIG 154*     A1C    Recent Labs   Lab Test 09/02/23  0718   A1C 8.3*     Troponin  No results for input(s): CTROPT, TROPONINIS, TROPONINI, GHTROP in the last 168 hours.       Stroke Code Data Data   Stroke Code Data  (for stroke code without tele)  Stroke code activated 09/02/23   1101   First stroke provider response         Last known normal 09/02/23   1040   Time of discovery   (or onset of symptoms) 09/02/23   1043   Head CT read by Stroke Neuro Dr/Provider 09/02/23   1130   Was stroke code de-escalated? No (TNK delivered at 1142)

## 2023-09-03 ENCOUNTER — APPOINTMENT (OUTPATIENT)
Dept: CT IMAGING | Facility: CLINIC | Age: 48
End: 2023-09-03
Attending: NURSE PRACTITIONER
Payer: MEDICAID

## 2023-09-03 ENCOUNTER — APPOINTMENT (OUTPATIENT)
Dept: CARDIOLOGY | Facility: CLINIC | Age: 48
End: 2023-09-03
Payer: MEDICAID

## 2023-09-03 LAB
ABO/RH(D): NORMAL
ANION GAP SERPL CALCULATED.3IONS-SCNC: 19 MMOL/L (ref 7–15)
ANION GAP SERPL CALCULATED.3IONS-SCNC: 20 MMOL/L (ref 7–15)
APTT PPP: 30 SECONDS (ref 22–38)
BUN SERPL-MCNC: 45.2 MG/DL (ref 6–20)
BUN SERPL-MCNC: 51.2 MG/DL (ref 6–20)
CALCIUM SERPL-MCNC: 9.4 MG/DL (ref 8.6–10)
CALCIUM SERPL-MCNC: 9.6 MG/DL (ref 8.6–10)
CHLORIDE SERPL-SCNC: 93 MMOL/L (ref 98–107)
CHLORIDE SERPL-SCNC: 97 MMOL/L (ref 98–107)
CREAT SERPL-MCNC: 2 MG/DL (ref 0.51–0.95)
CREAT SERPL-MCNC: 2.24 MG/DL (ref 0.51–0.95)
DEPRECATED HCO3 PLAS-SCNC: 19 MMOL/L (ref 22–29)
DEPRECATED HCO3 PLAS-SCNC: 21 MMOL/L (ref 22–29)
ERYTHROCYTE [DISTWIDTH] IN BLOOD BY AUTOMATED COUNT: 14.2 % (ref 10–15)
GAMMA INTERFERON BACKGROUND BLD IA-ACNC: 0.04 IU/ML
GFR SERPL CREATININE-BSD FRML MDRD: 27 ML/MIN/1.73M2
GFR SERPL CREATININE-BSD FRML MDRD: 31 ML/MIN/1.73M2
GLUCOSE BLDC GLUCOMTR-MCNC: 167 MG/DL (ref 70–99)
GLUCOSE BLDC GLUCOMTR-MCNC: 192 MG/DL (ref 70–99)
GLUCOSE BLDC GLUCOMTR-MCNC: 199 MG/DL (ref 70–99)
GLUCOSE BLDC GLUCOMTR-MCNC: 266 MG/DL (ref 70–99)
GLUCOSE BLDC GLUCOMTR-MCNC: 335 MG/DL (ref 70–99)
GLUCOSE SERPL-MCNC: 249 MG/DL (ref 70–99)
GLUCOSE SERPL-MCNC: 278 MG/DL (ref 70–99)
HCT VFR BLD AUTO: 46.5 % (ref 35–47)
HGB BLD-MCNC: 15.6 G/DL (ref 11.7–15.7)
INR PPP: 1.15 (ref 0.85–1.15)
LVEF ECHO: NORMAL
M TB IFN-G BLD-IMP: NEGATIVE
M TB IFN-G CD4+ BCKGRND COR BLD-ACNC: 9.96 IU/ML
MAGNESIUM SERPL-MCNC: 2.6 MG/DL (ref 1.7–2.3)
MCH RBC QN AUTO: 27.8 PG (ref 26.5–33)
MCHC RBC AUTO-ENTMCNC: 33.5 G/DL (ref 31.5–36.5)
MCV RBC AUTO: 83 FL (ref 78–100)
MITOGEN IGNF BCKGRD COR BLD-ACNC: 0.01 IU/ML
MITOGEN IGNF BCKGRD COR BLD-ACNC: 0.03 IU/ML
PLATELET # BLD AUTO: 219 10E3/UL (ref 150–450)
POTASSIUM SERPL-SCNC: 3.2 MMOL/L (ref 3.4–5.3)
POTASSIUM SERPL-SCNC: 3.9 MMOL/L (ref 3.4–5.3)
QUANTIFERON MITOGEN: 10 IU/ML
QUANTIFERON NIL TUBE: 0.04 IU/ML
QUANTIFERON TB1 TUBE: 0.07 IU/ML
QUANTIFERON TB2 TUBE: 0.05
RBC # BLD AUTO: 5.62 10E6/UL (ref 3.8–5.2)
SODIUM SERPL-SCNC: 134 MMOL/L (ref 136–145)
SODIUM SERPL-SCNC: 135 MMOL/L (ref 136–145)
SPECIMEN EXPIRATION DATE: NORMAL
WBC # BLD AUTO: 8.5 10E3/UL (ref 4–11)

## 2023-09-03 PROCEDURE — 85027 COMPLETE CBC AUTOMATED: CPT

## 2023-09-03 PROCEDURE — 250N000011 HC RX IP 250 OP 636: Performed by: NURSE PRACTITIONER

## 2023-09-03 PROCEDURE — 99291 CRITICAL CARE FIRST HOUR: CPT | Performed by: NURSE PRACTITIONER

## 2023-09-03 PROCEDURE — 80048 BASIC METABOLIC PNL TOTAL CA: CPT

## 2023-09-03 PROCEDURE — 99291 CRITICAL CARE FIRST HOUR: CPT | Mod: 25 | Performed by: INTERNAL MEDICINE

## 2023-09-03 PROCEDURE — 70450 CT HEAD/BRAIN W/O DYE: CPT

## 2023-09-03 PROCEDURE — 93010 ELECTROCARDIOGRAM REPORT: CPT | Performed by: INTERNAL MEDICINE

## 2023-09-03 PROCEDURE — 86900 BLOOD TYPING SEROLOGIC ABO: CPT | Performed by: INTERNAL MEDICINE

## 2023-09-03 PROCEDURE — 93321 DOPPLER ECHO F-UP/LMTD STD: CPT

## 2023-09-03 PROCEDURE — 70450 CT HEAD/BRAIN W/O DYE: CPT | Mod: 26 | Performed by: STUDENT IN AN ORGANIZED HEALTH CARE EDUCATION/TRAINING PROGRAM

## 2023-09-03 PROCEDURE — 36415 COLL VENOUS BLD VENIPUNCTURE: CPT | Performed by: INTERNAL MEDICINE

## 2023-09-03 PROCEDURE — 97530 THERAPEUTIC ACTIVITIES: CPT | Mod: GO

## 2023-09-03 PROCEDURE — 80048 BASIC METABOLIC PNL TOTAL CA: CPT | Performed by: INTERNAL MEDICINE

## 2023-09-03 PROCEDURE — 93321 DOPPLER ECHO F-UP/LMTD STD: CPT | Mod: 26 | Performed by: INTERNAL MEDICINE

## 2023-09-03 PROCEDURE — 83735 ASSAY OF MAGNESIUM: CPT | Performed by: INTERNAL MEDICINE

## 2023-09-03 PROCEDURE — 97165 OT EVAL LOW COMPLEX 30 MIN: CPT | Mod: GO

## 2023-09-03 PROCEDURE — 250N000013 HC RX MED GY IP 250 OP 250 PS 637: Performed by: NURSE PRACTITIONER

## 2023-09-03 PROCEDURE — 250N000013 HC RX MED GY IP 250 OP 250 PS 637: Performed by: INTERNAL MEDICINE

## 2023-09-03 PROCEDURE — 93325 DOPPLER ECHO COLOR FLOW MAPG: CPT | Mod: 26 | Performed by: INTERNAL MEDICINE

## 2023-09-03 PROCEDURE — 92526 ORAL FUNCTION THERAPY: CPT | Mod: GN

## 2023-09-03 PROCEDURE — 85610 PROTHROMBIN TIME: CPT

## 2023-09-03 PROCEDURE — 999N000147 HC STATISTIC PT IP EVAL DEFER

## 2023-09-03 PROCEDURE — 255N000002 HC RX 255 OP 636: Performed by: INTERNAL MEDICINE

## 2023-09-03 PROCEDURE — 200N000002 HC R&B ICU UMMC

## 2023-09-03 PROCEDURE — 85730 THROMBOPLASTIN TIME PARTIAL: CPT

## 2023-09-03 PROCEDURE — 999N000208 ECHOCARDIOGRAM LIMITED

## 2023-09-03 PROCEDURE — 93308 TTE F-UP OR LMTD: CPT | Mod: 26 | Performed by: INTERNAL MEDICINE

## 2023-09-03 PROCEDURE — 93005 ELECTROCARDIOGRAM TRACING: CPT

## 2023-09-03 RX ORDER — POLYETHYLENE GLYCOL 3350 17 G/17G
17 POWDER, FOR SOLUTION ORAL DAILY
Status: DISCONTINUED | OUTPATIENT
Start: 2023-09-03 | End: 2023-09-11 | Stop reason: HOSPADM

## 2023-09-03 RX ORDER — AMOXICILLIN 250 MG
1-2 CAPSULE ORAL 2 TIMES DAILY
Status: DISCONTINUED | OUTPATIENT
Start: 2023-09-03 | End: 2023-09-11 | Stop reason: HOSPADM

## 2023-09-03 RX ORDER — POTASSIUM CHLORIDE 750 MG/1
40 TABLET, EXTENDED RELEASE ORAL ONCE
Status: COMPLETED | OUTPATIENT
Start: 2023-09-03 | End: 2023-09-03

## 2023-09-03 RX ORDER — IOPAMIDOL 755 MG/ML
67 INJECTION, SOLUTION INTRAVASCULAR ONCE
Status: DISCONTINUED | OUTPATIENT
Start: 2023-09-03 | End: 2023-09-03

## 2023-09-03 RX ORDER — ATORVASTATIN CALCIUM 80 MG/1
80 TABLET, FILM COATED ORAL AT BEDTIME
Status: DISCONTINUED | OUTPATIENT
Start: 2023-09-04 | End: 2023-09-11 | Stop reason: HOSPADM

## 2023-09-03 RX ORDER — HEPARIN SODIUM 5000 [USP'U]/.5ML
5000 INJECTION, SOLUTION INTRAVENOUS; SUBCUTANEOUS EVERY 8 HOURS
Status: DISCONTINUED | OUTPATIENT
Start: 2023-09-03 | End: 2023-09-11 | Stop reason: HOSPADM

## 2023-09-03 RX ADMIN — HUMAN ALBUMIN MICROSPHERES AND PERFLUTREN 6 ML: 10; .22 INJECTION, SOLUTION INTRAVENOUS at 09:58

## 2023-09-03 RX ADMIN — INSULIN ASPART 2 UNITS: 100 INJECTION, SOLUTION INTRAVENOUS; SUBCUTANEOUS at 08:12

## 2023-09-03 RX ADMIN — ATORVASTATIN CALCIUM 20 MG: 10 TABLET, FILM COATED ORAL at 20:15

## 2023-09-03 RX ADMIN — ACETAMINOPHEN 650 MG: 325 TABLET, FILM COATED ORAL at 08:04

## 2023-09-03 RX ADMIN — HEPARIN SODIUM 5000 UNITS: 5000 INJECTION, SOLUTION INTRAVENOUS; SUBCUTANEOUS at 17:17

## 2023-09-03 RX ADMIN — POTASSIUM CHLORIDE 40 MEQ: 750 TABLET, EXTENDED RELEASE ORAL at 18:51

## 2023-09-03 RX ADMIN — POLYETHYLENE GLYCOL 3350 17 G: 17 POWDER, FOR SOLUTION ORAL at 14:00

## 2023-09-03 RX ADMIN — SPIRONOLACTONE 50 MG: 50 TABLET ORAL at 08:04

## 2023-09-03 RX ADMIN — HYDRALAZINE HYDROCHLORIDE 25 MG: 25 TABLET, FILM COATED ORAL at 14:00

## 2023-09-03 RX ADMIN — HYDROXYZINE HYDROCHLORIDE 50 MG: 25 TABLET, FILM COATED ORAL at 14:29

## 2023-09-03 RX ADMIN — TORSEMIDE 100 MG: 100 TABLET ORAL at 08:05

## 2023-09-03 RX ADMIN — INSULIN ASPART 5 UNITS: 100 INJECTION, SOLUTION INTRAVENOUS; SUBCUTANEOUS at 12:08

## 2023-09-03 RX ADMIN — HYDROXYZINE HYDROCHLORIDE 50 MG: 25 TABLET, FILM COATED ORAL at 20:15

## 2023-09-03 RX ADMIN — SERTRALINE HYDROCHLORIDE 100 MG: 50 TABLET ORAL at 08:04

## 2023-09-03 RX ADMIN — DAPAGLIFLOZIN 10 MG: 10 TABLET, FILM COATED ORAL at 08:05

## 2023-09-03 RX ADMIN — HYDRALAZINE HYDROCHLORIDE 25 MG: 25 TABLET, FILM COATED ORAL at 20:15

## 2023-09-03 RX ADMIN — HYDRALAZINE HYDROCHLORIDE 25 MG: 25 TABLET, FILM COATED ORAL at 08:04

## 2023-09-03 RX ADMIN — HYDROXYZINE HYDROCHLORIDE 50 MG: 25 TABLET, FILM COATED ORAL at 08:34

## 2023-09-03 RX ADMIN — INSULIN DETEMIR 40 UNITS: 100 INJECTION, SOLUTION SUBCUTANEOUS at 22:08

## 2023-09-03 ASSESSMENT — VISUAL ACUITY
OU: NORMAL ACUITY

## 2023-09-03 ASSESSMENT — ACTIVITIES OF DAILY LIVING (ADL)
ADLS_ACUITY_SCORE: 20
PREVIOUS_RESPONSIBILITIES: MEAL PREP;HOUSEKEEPING;LAUNDRY;DRIVING
ADLS_ACUITY_SCORE: 20
ADLS_ACUITY_SCORE: 20

## 2023-09-03 NOTE — PHARMACY-CONSULT NOTE
Pharmacy Consult to evaluate for medication related stroke core measures    Jamilah Juany Jiménez, 47 year old female admitted for consideration for advanced heart failure therapies on 8/31/2023.    Thrombolytic was given on 9/2/2023 at 1142.    VTE Prophylaxis Heparin given on 9/3/2023 as appropriate prior to end of hospital day 2.    Antithrombotic: Pending MRI and discussion with cardiology. If indicated, will continue antithrombotic therapy on discharge to meet quality measures. Patient has a noted ASA allergy.     Anticoagulation if history of A-fib/flutter: Patient does not have history of A-fib/flutter - anticoagulation not required for medication related stroke core measures.     LDL Cholesterol Calculated   Date Value Ref Range Status   09/02/2023 139 (H) <=100 mg/dL Final       Patient currently receiving Lipitor (atorvastatin) continue statin on discharge to meet quality measures, unless contraindicated.    Recommendations:  Consider increasing atorvastatin to high-intensity  dosing (40 mg or 80 mg) for secondary prevention.    Thank you for the consult.    Jaylene Alarcon RPH 9/3/2023 4:44 PM

## 2023-09-03 NOTE — PROGRESS NOTES
Stroke Education Note    The following information has been reviewed with the patient:    1. Warning signs of stroke    2. Calling 911 if having warning signs of stroke    3. All modifiable risk factors: hypertension, CAD, atrial fib, diabetes, hypercholesterolemia, smoking, substance abuse, diet, physical inactivity, obesity, sleep apnea    4. Patient's risk factors for stroke which include:  hypertension  diabetes    5. Follow-up plan for after discharge    6. Discharge medications which include: N/A    In addition, the above information was given to the patient in writing as a part of the Understanding Stroke Handbook.     Learner's response to risk factors / lifestyle modification education: NA - Precontemplative     Almaz Fernandes RN

## 2023-09-03 NOTE — PLAN OF CARE
ICU End of Shift Summary. See flowsheets for vital signs and detailed assessment.    Changes this shift: AOVSS on room air. Q1H neuro exams remained unchanged for duration of shift. CROWELL. Makes needs known and follows commands appropriately. PRN atarax 50mg given for reports of anxiety with relief. NIH consistently zero. Upper and lower extremities equal/strong bilat. Sinus rhythm. Normotensive. Afebrile. Voids spontaneously- steady gait up to toilet, adequate UOP. No BM per this shift. Update given via bedside RN to POA overnight.     Plan: Continue to monitor neuro exams Q1H per stroke assessment audit till 1100. Will contact primary care team with questions and concerns.     Nancy Sanford RN   Critical Care Flex Team    Goal Outcome Evaluation:    Plan of Care Reviewed With: patient  Overall Patient Progress: improvingOverall Patient Progress: improving  Outcome Evaluation: NIH 0 duration of shift. no outward deficits. AOVSS    Problem: Plan of Care - These are the overarching goals to be used throughout the patient stay.    Goal: Plan of Care Review  Description: The Plan of Care Review/Shift note should be completed every shift.  The Outcome Evaluation is a brief statement about your assessment that the patient is improving, declining, or no change.  This information will be displayed automatically on your shift note.  Outcome: Progressing  Flowsheets (Taken 9/3/2023 5949)  Outcome Evaluation: NIH 0 duration of shift. no outward deficits. AOVSS  Plan of Care Reviewed With: patient  Overall Patient Progress: improving

## 2023-09-03 NOTE — PLAN OF CARE
4E Defer Physical Therapy - Per chart review and discussion with Occupational Therapy, Pt ambulating with SBA no AD currently and nearing baseline mobility with no new deficits since recent stroke affecting bed mobility, transfers, and gait at this time. Plan for OT to continue to follow to address functional independence as needed. Pt with no skilled inpatient PT needs, Will complete consult and defer evaluation, please reconsult as appropriate if patient has decline in functional mobility requiring further skilled inpatient PT needs. Defer Discharge recommendations to Occupational Therapy.

## 2023-09-03 NOTE — PROGRESS NOTES
09/03/23 0902   Appointment Info   Signing Clinician's Name / Credentials (OT) Leilani Mccoy OTR/L       Present no   Living Environment   People in Home other relative(s)   Current Living Arrangements house   Home Accessibility stairs within home   Number of Stairs, Within Home, Primary greater than 10 stairs;other (see comments)  (12 to basement, can avoid)   Transportation Anticipated family or friend will provide   Living Environment Comments Pt reports living in house w/ mother and nephew in [place], all needs met on main level, ~12 stairs to basement, can avoid if needed.   Self-Care   Usual Activity Tolerance good   Current Activity Tolerance good   Equipment Currently Used at Home none   Fall history within last six months no   Activity/Exercise/Self-Care Comment Pt IND with ADL tasks at baseline, no AD for functional mobility.   Instrumental Activities of Daily Living (IADL)   Previous Responsibilities meal prep;housekeeping;laundry;driving   IADL Comments Pt reports IADL IND at baseline, does not work, will have family assist at home.   General Information   Onset of Illness/Injury or Date of Surgery 08/31/23   Referring Physician Ramakrishna Mondragon MD   Patient/Family Therapy Goal Statement (OT) Return home and to PLOF   Additional Occupational Profile Info/Pertinent History of Current Problem Per EMR, Jamilah Juany Jiménez is a 47 year old female with PMH of HFrEF (EF = 17%), Class III, Stage C-D, DM2, HTN, HLD, PE admitted following RHC with CI 1.61 (8/31/2023) for further evaluation and consideration for advanced heart failure therapies. Stroke code activated 9/2/2023 @ 1101 for acute onset dizziness, numbness, weakness & difficulty speaking. LKW @ 1040.      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 Initial NIHSS = 7.     After verifying no  contraindications, patient was consented for TNK. There was some delay in delivering TNK due to patient hesitation. Repeat exam showed some possible fluctuation in patient's Right-sided weakness with some suspected functional component. However, her aphasia, dysarthria and homonymous hemianopsia remained constant. Therefore recommendation remained unchanged to deliver TNK. After some deliberation, patient eventually agreed and received TNK @ 1142.   Existing Precautions/Restrictions fall;cardiac   Limitations/Impairments safety/cognitive   Left Upper Extremity (Weight-bearing Status) full weight-bearing (FWB)   Right Upper Extremity (Weight-bearing Status) full weight-bearing (FWB)   Left Lower Extremity (Weight-bearing Status) full weight-bearing (FWB)   Right Lower Extremity (Weight-bearing Status) full weight-bearing (FWB)   General Observations and Info Activity: Advance Activity As Tolerated, with assist   Cognitive Status Examination   Orientation Status orientation to person, place and time   Cognitive Status Comments Cognition appears grossly intact, slightly anxious, will monitor   Visual Perception   Visual Impairment/Limitations WFL;corrective lenses full-time   Impact of Vision Impairment on Function (Vision) Denies acute vision changes, reports 'vision has resolved'   Sensory   Sensory Quick Adds sensation intact   Sensory Comments Denies UE/LE numbness and tingling this date   Pain Assessment   Patient Currently in Pain No   Posture   Posture not impaired   Posture Comments while sitting at EOB   Range of Motion Comprehensive   General Range of Motion bilateral upper extremity ROM WFL   Strength Comprehensive (MMT)   Comment, General Manual Muscle Testing (MMT) Assessment B UE MMT ~5/5 this date   Muscle Tone Assessment   Muscle Tone Quick Adds No deficits were identified   Coordination   Upper Extremity Coordination No deficits were identified   Coordination Comments B UE GMC and FMC appear WFL   Bed  Mobility   Bed Mobility sit-supine   Sit-Supine [place] (Bed Mobility) supervision   Comment (Bed Mobility) per clinical judgement   Transfers   Transfers sit-stand transfer;toilet transfer   Sit-Stand Transfer   Sit/Stand Transfer Comments SBA STS from EOB, no AD   Toilet Transfer   Type (Toilet Transfer) sit-stand   Toilet Transfer Comments per clinical judgement, SBA   Balance   Balance Assessment sitting balance: static   Balance Comments SBA static sitting balance at EOB   Activities of Daily Living   BADL Assessment/Intervention bathing;upper body dressing;lower body dressing;toileting   Bathing Assessment/Intervention   [place] Level (Bathing) contact guard assist;verbal cues;set up   Comment, (Bathing) per clinical judgement   Upper Body Dressing Assessment/Training   Comment, (Upper Body Dressing) per clinical judgement   [place] Level (Upper Body Dressing) independent   Lower Body Dressing Assessment/Training   Comment, (Lower Body Dressing) per clinical judgement   [place] Level (Lower Body Dressing) independent   Toileting   Comment, (Toileting) per clinical judgement   [place] Level (Toileting) supervision   Clinical Impression   Criteria for Skilled Therapeutic Interventions Met (OT) Yes, treatment indicated   OT Diagnosis Decreased ADL IND/safety, functional mobility, overall muscular strength/endurance, cardiopulmonary function   OT Problem List-Impairments impacting ADL problems related to;activity tolerance impaired;balance;mobility;range of motion (ROM);strength   Assessment of Occupational Performance 1-3 Performance Deficits   Identified Performance Deficits longer-distance mobility, IADL tasks, muscular strength/endurance, cardiopulmonary function   Planned Therapy Interventions (OT) IADL retraining;ROM;strengthening;transfer training;progressive activity/exercise   Clinical Decision Making Complexity (OT) low complexity   Anticipated Equipment Needs Upon Discharge (OT)  other (see comments)  (TBD)   Risk & Benefits of therapy have been explained evaluation/treatment results reviewed;care plan/treatment goals reviewed;participants included;patient   Clinical Impression Comments Pt near baseline function for ADL tasks, functional mobility, overall activity tolerance, decreased functional endurance. Will benefit from continued OT/CR to maintain cardiopulmonary function and ADL/IADL IND/safety pending on medical course.   OT Total Evaluation Time   OT Eval, Low Complexity Minutes (02974) 5   OT Goals   Therapy Frequency (OT) 1 time/wk   OT Predicted Duration/Target Date for Goal Attainment 09/12/23   OT Goals Cardiac Phase 1;OT Goal 1;Home Management   OT: Home Management Supervision/stand-by assist;with moderate demand household tasks;ambulatory level   OT: Understanding of cardiac education to maximize quality of life, condition management, and health outcomes Patient;Caregiver;Verbalize;Demonstrate   OT: Perform aerobic activity with stable cardiovascular response intermittent;5 minutes;10 minutes;ambulation;NuStep   OT: Functional/aerobic ambulation tolerance with stable cardiovascular response in order to return to home and community environment Supervision/SBA;Greater than 300 feet   OT: Navigation of stairs simulating home set up with stable cardiovascular response in order to return to home and community environment Supervision/SBA;Greater than 10 stairs   OT: Goal 1 Pt will be able to demonstrate UE HEP with SBA while seated in chair or in static standing prior to dc home.   OT Discharge Planning   OT Plan OT/CR: functional mobility, NuStep, CR, HEP, cardiac group?   OT Discharge Recommendation (DC Rec) home with outpatient cardiac rehab   OT Rationale for DC Rec Pt near baseline function for ADL tasks, mobility, overall activity tolerance, limited by decreased functional endurance. Anticipate with continued IP therapies, pt will be able to discharge home with OP CR to  progress ADL/IADL IND/safety and cardiopulmonary function pending on medical course. Will monitor.   OT Brief overview of current status SBA in-hallway   Total Session Time   Total Session Time (sum of timed and untimed services) 5

## 2023-09-03 NOTE — CARE PLAN
1034, pt unable to articulate name or date of birth. Speech was slow, slurred, and pt presented with difficulty of word finding. Throughout episode, pt continued to have purposeful, strong, equal strength bilaterally. Neurocrit immediately at bedside. CT scan performed.     Jes Antonio RN on 9/3/2023 at 12:55 PM

## 2023-09-03 NOTE — PROGRESS NOTES
Neurocritical Care Progress Note    Reason for critical care admission: Acute HFrEF   Admitting Team: Cardiology    Date of Service:  09/03/2023  Date of Admission:  8/31/2023  Hospital Day: 4    Assessment/Plan  Jamilah Jiménez is a 47-year-old female with HFrEF, Class III, Stage C-D, DM2, HTN, PE admitted following RHC with CI 1.61, who has been admitted since 8/31 for further evaluation and consideration for advanced heart failure therapies. She developed acute-onset expressive aphasia and right-sided weakness/numbness on 9/2, with LKW at 1040. Initial NIHSS of 7 for a right homonomous hemianopia, RUE and RLE drift, sensory loss over the right hemibody, mild expressive aphasia, and mild dysarthria. CTH was negative for acute bleeding, but did show an area of hypodensity in the left parietal lobe consistent with old stroke. CTA was negative for LVO. Given a clinical syndrome consistent with acute ischemic stroke localizing to the left frontal and parietal lobes, and an absence of contraindications to thrombolytic therapy, tenecteplase (TNK) was administered at 1142 on 9/2. Patient was transferred to the neurocritical care unit for further management. Stroke mechanism is undetermined at this time, but patient does have multiple risk factors for cardioembolic processes given her severely reduced EF.    24 hour events: PRN Hydroxyzine 50 mg given x1 for anxiety with relief. NIHSS consistently zero. No acute events overnight.      Neuro  #Concern for acute ischemic stroke of the left cerebral hemisphere s/p TNK administration, uncertain mechanism at this time  -Neuro checks and vital signs per post-thrombolytic orders and monitor closely for any evidence of CNS hemorrhage, bleeding, or orolingual angioedema  -Statin: Atorvastatin 80 mg QD  -Repeat CT head without hemorrhage post TNK  -MRI Brain without contrast ordered (needs coordination with device nurse)  -A1c (9/2) = 9.4%  -LDL (9/2) = 137  -PT/OT  -Stroke  "Education  -Depression Screen  -Apnea screening questions    #Analgesics & sedation  RASS goal: 0 to -1  -Tylenol PRN    Psych  #Depression  #Anxiety  -Continue Sertraline 100 mg daily  -Continue Hydroxyzine 25-50 Q6h PRN     CV  #HFrEF (LVEF 18%), non-ischemic cardiomyopathy (genetic) s/p ICD, ACC/AHA Stage D, NYHA Symptom Class IV  #HTN   #HLD   Per primary team , \"patient with progressively worsening dyspnea, edema, overall functional status over the last ~ 2 years. 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 Has been on diuretics, GDMT, has ICD in place.\"  -Was being diuresed with torsemide 100 mg BID, with reasonable response but continues to be hypervolemic   -Cardiac monitoring  -SBP goal < 180 mmHg  -PRN Labetalol and Hydralazine  -Guideline directed medical therapy:   -ACE-I/ARB/ARNi: Entresto  mg BID. Held in the setting of low BP during/before code stroke called 9/2/2023.   -BB: Carvedilol 3.125 mg BID (will hold in setting of low CI)   -Aldosterone antagonist: Spironolactone 50 mg; Potassium Chloride 60 mEq TID (Held for hyperkalemia)  -SGLT2i: Dapagliflozin 10 mg daily   -Hydral/Nitrates: Hydralazine 25 mg TID   -DVT prophylaxis: Start Heparin subcutaneous 5000 Q8h   -Statin: Atorvastatin 20 mg at bedtime     Resp  Oxygen/vent: Room air  -Continuous pulse ox  -Maintain O2 saturations greater than 92%    GI  Diet: Soft & bite sized; thin liquids  -2000 ML fluid restriction   Last BM: Nothing recorded since admission   GI prophylaxis: Not indicated  -Bowel regimen: Start scheduled Senna-docusate BID and Miralax daily     Renal/  #Hyponatremia  #ION  -Daily BMP  -IV fluids: None  -Electrolyte replacement protocol    Endo  #Type 2 DM  #Hyperglycemia    Most recent Hgb A1c 7.8 on 08/10/2023  -Increase Detemir to 40 U at bedtime  -Stop medium insulin sliding scale; and initiate high insulin sliding scale    Heme  -Daily CBC  -Hgb goal >7, plt goal >50k  -Transfuse " "to meet Hgb and plt goals    ID  -Afebrile   -Daily CBC  -Follow temperature curve    ICU Checklist  Lines/tubes/drains: PIV x2  FEN: Soft & bite sized; thin liquids, replacement   PPX: DVT - SCDs, HSQ; GI - not indicated.  Code: Full code   Dispo: Ok to transfer to floor under Cardiology      Clinically Significant Risk Factors        # Hypokalemia: Lowest K = 3.2 mmol/L in last 2 days, will replace as needed  # Hyperkalemia: Highest K = 5.4 mmol/L in last 2 days, will monitor as appropriate      # Anion Gap Metabolic Acidosis: Highest Anion Gap = 19 mmol/L in last 2 days, will monitor and treat as appropriate     # Acute Kidney Injury, unspecified: based on a >150% or 0.3 mg/dL increase in last creatinine compared to past 90 day average, will monitor renal function    # Hypertension: Noted on problem list    # Chronic heart failure with reduced ejection fraction: last echo with EF <40%      # DMII: A1C = 8.8 % (Ref range: <5.7 %) within past 6 months     # Overweight: Estimated body mass index is 27 kg/m  as calculated from the following:    Height as of this encounter: 1.702 m (5' 7.01\").    Weight as of this encounter: 78.2 kg (172 lb 6.4 oz).  , PRESENT ON ADMISSION      # ICD device present    TIME SPENT ON THIS ENCOUNTER   I spent 50 minutes of my time on the unit/floor managing the care of Jamilah Jiménez excluding time performing procedures. Upon evaluation, this patient had an acute onset of aphasia s/p TNK which required my direct attention, intervention, and personal management. Greater than 50% of my time was spent at the bedside counseling the patient and/or coordinating care including chart review, history, exam, documentation, and further activities per this note. I have personally reviewed the following data/imaging over the past 24 hours.     The patient was discussed with the NCC attending, Dr. Mercado.    Niesha ORELLANA, HENRY  Neurocritical care nurse practitioner  Pager: " [phone]  Ascom: *[phone] available [facility] [phone] [place] [phone]     24 Hour Vital Signs Summary:  Temp: 97.9  F (36.6  C) Temp  Min: 97.5  F (36.4  C)  Max: 97.9  F (36.6  C)  Resp: 22 Resp  Min: 9  Max: 27  SpO2: 99 % SpO2  Min: 86 %  Max: 99 %  Pulse: 96 Pulse  Min: 70  Max: 96  BP: 113/73 Systolic (24hrs), Av , Min:94 , Max:129   Diastolic (24hrs), Av, Min:53, Max:87    Respiratory monitoring:   Resp: 22  Room air     I/O last 3 completed shifts:  In: 8 [P.O.:185]  Out: 550 [Urine:550]    Current Medications:   atorvastatin  20 mg Oral or Feeding Tube At Bedtime    dapagliflozin  10 mg Oral or Feeding Tube QAM    heparin ANTICOAGULANT  5,000 Units Subcutaneous Q8H    hydrALAZINE  25 mg Oral or Feeding Tube TID    insulin aspart  1-10 Units Subcutaneous TID AC    insulin aspart  1-7 Units Subcutaneous At Bedtime    insulin detemir  40 Units Subcutaneous At Bedtime    polyethylene glycol  17 g Oral or Feeding Tube Daily    [Held by provider] potassium chloride  60 mEq Oral or Feeding Tube TID    [Held by provider] sacubitril-valsartan  1 tablet Oral BID    senna-docusate  1-2 tablet Oral or Feeding Tube BID    sertraline  100 mg Oral or Feeding Tube Daily    sodium chloride (PF)  3 mL Intracatheter Q8H    [Held by provider] spironolactone  50 mg Oral or Feeding Tube QAM    [Held by provider] torsemide  100 mg Oral or Feeding Tube BID       PRN Medications:  acetaminophen, - MEDICATION INSTRUCTIONS -, glucose **OR** dextrose **OR** glucagon, HOLD MEDICATION, hydrALAZINE, hydrOXYzine **OR** hydrOXYzine, labetalol, lidocaine 4%, lidocaine (buffered or not buffered), - MEDICATION INSTRUCTIONS -, menthol (Topical Analgesic) 2.5%, ondansetron, prochlorperazine, BETA BLOCKER NOT PRESCRIBED, sodium chloride (PF)    Infusions:   - MEDICATION INSTRUCTIONS -      - MEDICATION INSTRUCTIONS -      BETA BLOCKER NOT PRESCRIBED         Allergies   Allergen Reactions    Aspirin Hives     Told nursing on 22 she breaks out  "in hives if she takes aspirin.    Ibuprofen Hives    Lisinopril Cough    Oxycodone-Acetaminophen Nausea and Vomiting       Physical Examination:  Vitals: /73 (BP Location: Left arm, Cuff Size: Adult Regular)   Pulse 96   Temp 97.9  F (36.6  C) (Oral)   Resp 22   Ht 1.702 m (5' 7.01\")   Wt 78.2 kg (172 lb 6.4 oz)   SpO2 99%   BMI 27.00 kg/m    General: Adult female patient, lying in bed  HEENT: Normocephalic, atraumatic, no icterus  Cardiac: Sinus rhythm on bedside monitor   Pulm: Unlabored, expansion symmetric, no retractions or use of accessory muscles  Abdomen: Soft, non-distended abdomen   Extremities: Warm, no edema, appears adequately perfused  Skin: No rash or lesion observed on exposed skin   Psych: Calm and cooperative  Neuro:  Mental status: Awake, alert, attentive, oriented to self, time, place, and circumstance. Language is fluent and coherent with intact comprehension of complex commands, naming and repetition.  Cranial nerves: VFF, PERRL, conjugate gaze, EOMI, facial sensation intact, face symmetric, shoulder shrug strong, tongue midline, no dysarthria.   Motor: Normal bulk and tone. No abnormal movements. 5/5 strength in 4/4 extremities.   Sensory: Intact to light touch x 4 extremities  Coordination: FNF and HS without ataxia or dysmetria.   Gait: REBECCA, deferred.    [employer] Stroke Scale  Exam Interval: 0900   Score    Level of consciousness: (0)   Alert, keenly responsive    LOC questions: (0)   Answers both questions correctly    LOC commands: (0)   Performs both tasks correctly    Best gaze: (0)   Normal    Visual: (0)   No visual loss    Facial palsy: (0)   Normal symmetrical movements    Motor arm (left): (0)   No drift    Motor arm (right): (0)   No drift    Motor leg (left): (0)   No drift    Motor leg (right): (0)   No drift    Limb ataxia: (0)   Absent    Sensory: (0)   Normal- no sensory loss    Best language: (0)   Normal- no aphasia    Dysarthria: (0)   " Normal    Extinction and inattention: (0)   No abnormality        Total Score:  0      Labs and Imaging:    Results for orders placed or performed during the hospital encounter of 08/31/23 (from the past 24 hour(s))   Potassium   Result Value Ref Range    Potassium 4.0 3.4 - 5.3 mmol/L   Extra Tube    Narrative    The following orders were created for panel order Extra Tube.  Procedure                               Abnormality         Status                     ---------                               -----------         ------                     Extra Purple Top Tube[[id number]]                            Final result                 Please view results for these tests on the individual orders.   Extra Purple Top Tube   Result Value Ref Range    Hold Specimen JIC    Glucose by meter   Result Value Ref Range    GLUCOSE BY METER POCT 187 (H) 70 - 99 mg/dL   Glucose by meter   Result Value Ref Range    GLUCOSE BY METER POCT 192 (H) 70 - 99 mg/dL   CBC with platelets   Result Value Ref Range    WBC Count 8.5 4.0 - 11.0 10e3/uL    RBC Count 5.62 (H) 3.80 - 5.20 10e6/uL    Hemoglobin 15.6 11.7 - 15.7 g/dL    Hematocrit 46.5 35.0 - 47.0 %    MCV 83 78 - 100 fL    MCH 27.8 26.5 - 33.0 pg    MCHC 33.5 31.5 - 36.5 g/dL    RDW 14.2 10.0 - 15.0 %    Platelet Count 219 150 - 450 10e3/uL   Basic metabolic panel   Result Value Ref Range    Sodium 135 (L) 136 - 145 mmol/L    Potassium 3.9 3.4 - 5.3 mmol/L    Chloride 97 (L) 98 - 107 mmol/L    Carbon Dioxide (CO2) 19 (L) 22 - 29 mmol/L    Anion Gap 19 (H) 7 - 15 mmol/L    Urea Nitrogen 45.2 (H) 6.0 - 20.0 mg/dL    Creatinine 2.00 (H) 0.51 - 0.95 mg/dL    Calcium 9.4 8.6 - 10.0 mg/dL    Glucose 249 (H) 70 - 99 mg/dL    GFR Estimate 31 (L) >60 mL/min/1.73m2   INR   Result Value Ref Range    INR 1.15 0.85 - 1.15   Partial thromboplastin time   Result Value Ref Range    aPTT 30 22 - 38 Seconds   ABO and Rh   Result Value Ref Range    ABO/RH(D) O POS     SPECIMEN EXPIRATION DATE  [id number]    Glucose by meter   Result Value Ref Range    GLUCOSE BY METER POCT 199 (H) 70 - 99 mg/dL   Echo Limited   Result Value Ref Range    LVEF  15-20% (severely reduced)     [facility]    [id number]  [id number]  [id number]  [id number]^JANAY     [facility],[place]  Echocardiography Laboratory  [address] [place] [address]     Name: IRTO CHRISTY  MRN: [id number]  : 1975  Study Date: 2023 09:42 AM  Age: 47 yrs  Gender: Female  Patient Location: [facility]  Reason For Study: CVA  Ordering Physician: JANAY MEDINA  Referring Physician: DELFINA COOPER  Performed By: Luis Alberto Mccoy     BSA: 1.9 m2  Height: 67 in  Weight: 168 lb  HR: 91  BP: 97/74 mmHg  ______________________________________________________________________________  Procedure  Limited Portable Echo Adult. Contrast Optison. Optison (NDC #7674-7180-31)  given intravenously. Patient was given 6 ml mixture of 3 ml Optison and 6 ml  saline. 3 ml wasted.  ______________________________________________________________________________  Interpretation Summary  No cardiac source of embolus identified.  Severe left ventricular dilation is present. Left ventricular function is  decreased. The ejection fraction is 15-20% (severely reduced).  Global right ventricular function is normal.  No pericardial effusion is present.  This study was compared with the study from 23: No significant changes  noted.     ______________________________________________________________________________  Left Ventricle  Severe left ventricular dilation is present. Left ventricular function is  decreased. The ejection fraction is 15-20% (severely reduced). Severe diffuse  hypokinesis is present. There is no thrombus seen in the left ventricle.     Right Ventricle  The right ventricle is normal size. Global right ventricular function is  normal. A pacemaker lead is noted in the right ventricle.     Atria  The atria cannot be  assessed.     Mitral Valve  The mitral valve is normal.     Aortic Valve  The valve leaflets are not well visualized. On Doppler interrogation, there is  no significant stenosis or regurgitation.     Vessels  The inferior vena cava was normal in size with preserved respiratory  variability. IVC diameter <2.1 cm collapsing >50% with sniff suggests a normal  RA pressure of 3 mmHg.     Pericardium  No pericardial effusion is present.     Compared to Previous Study  This study was compared with the study from 8/31/23 . No significant changes  noted.     ______________________________________________________________________________  MMode/2D Measurements & Calculations  IVSd: 1.2 cm  LVIDd: 6.2 cm  LVIDs: 5.5 cm  LVPWd: 0.92 cm  FS: 12.0 %  LV mass(C)d: 278.5 grams  LV mass(C)dI: 148.3 grams/m2  RWT: 0.29     ______________________________________________________________________________  Report approved by: France Roe 09/03/2023 02:38 PM         CT Head w/o Contrast    Narrative    EXAM: CT HEAD W/O CONTRAST  9/3/2023 10:50 AM     HISTORY: acute onset aphasia s/p TNK on 9/2       COMPARISON: 9/2/2023    TECHNIQUE: Using multidetector thin collimation helical acquisition  technique, axial, coronal and sagittal CT images from the skull base  to the vertex were obtained without intravenous contrast.   (topogram) image(s) also obtained and reviewed.    FINDINGS:  No acute intracranial hemorrhage, mass effect, or midline shift. No  acute loss of gray-white matter differentiation in the cerebral  hemispheres. Mild patchy hypoattenuation of the left parietal  periventricular white matter, similar to prior. Ventricles are  proportionate to the cerebral sulci. Clear basal cisterns.    The bony calvaria and the bones of the skull base are normal. The  visualized portions of the paranasal sinuses and mastoid air cells are  clear. Grossly normal orbits.       Impression    IMPRESSION:   1. No CT evidence of  intracranial hemorrhage.  2. Stable appearance of patchy hypoattenuation of the left parietal  periventricular white matter.     I have personally reviewed the examination and initial interpretation  and I agree with the findings.    JACOB BARTH MD         SYSTEM ID:  [id number]   Glucose by meter   Result Value Ref Range    GLUCOSE BY METER POCT 335 (H) 70 - 99 mg/dL   EKG 12-lead, complete   Result Value Ref Range    Systolic Blood Pressure  mmHg    Diastolic Blood Pressure  mmHg    Ventricular Rate 80 BPM    Atrial Rate 80 BPM    VT Interval 178 ms    QRS Duration 112 ms     ms    QTc 489 ms    P Axis 41 degrees    R AXIS -38 degrees    T Axis 90 degrees    Interpretation ECG       Sinus rhythm  Possible Left atrial enlargement  Left axis deviation  Left ventricular hypertrophy ( R in aVL , Herberth product )  Prolonged QT  Abnormal ECG  When compared with ECG of 31-AUG-2023 21:01,  QRS duration has increased  ST no longer depressed in Inferior leads          All relevant imaging and laboratory values personally reviewed.

## 2023-09-03 NOTE — PROGRESS NOTES
"  [facility]   Cardiology II Service / Advanced Heart Failure  Daily Progress Note  Date of Service: 9/1/2023      Patient: Jamilah Jiménez  MRN: [id number]  Admission Date: 8/31/2023  Hospital Day # 3    Assessment and Plan:  Jamilah Jiménez is a 47-year-old female with HFrEF, Class III, Stage C-D, DM2, HTN, PE admitted following RHC with CI 1.61. Admitted for further evaluation and consideration for advanced heart failure therapies.     9/3:  -Was noted to be at baseline in the early morning and was walking around until mid morning when, \"pt unable to articulate name or date of birth. Speech was slow, slurred, and pt presented with difficulty of word finding. Throughout episode, pt continued to have purposeful, strong, equal strength bilaterally.\" Repeat CT Head without evidence of intracranial hemorrhage, stable appearance of patchy hypoattenuation of the left parietal periventricular white matter.   - MRI Brain 09/05/2023 (ICD)   - Creatinine 2.00 (from 1.52); had CTA Head Neck w/contrast 09/02, will hold Entresto, spironolactone, and torsemide    #Nausea  #Aphasia  #Right-sided numbness and weakness   On 09/02, she developed acute-onset expressive aphasia and right-sided weakness/numbness, CTH was negative for acute bleeding, but did show an area of hypodensity in the left parietal lobe consistent with old stroke. CTA was negative for LVO. Given a clinical syndrome consistent with acute ischemic stroke localizing to the left frontal and parietal lobes, and an absence of contraindications to thrombolytic therapy, tenecteplase (TNK) was administered at She was transferred to the neurocritical care unit for further management. Similar episode 09/03 where she was unable to articulate her name or date of birth with slow and slurred speech and word finding difficulty. Throughout the episode, was noted to have purposeful, strong, equal strength bilaterally. Repeat Head CT without evidence of " intracranial hemorrhage, stable appearance of patchy hypoattenuation of the left parietal periventricular matter.         #HFrEF (LVEF 18%), non-ischemic cardiomyopathy (genetic) s/p ICD  #HTN   #HLD   ACC/AHA Stage D, NYHA Symptom Class IV  Patient with progressively worsening dyspnea, edema, overall functional status over the last ~ 2 years. 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 Has been on diuretics, GDMT, has ICD in place.     Primary Cardiologist: Dr. Saucedo Last seen 12/06/2022   Ischemic Eval: Coronary angiogram 12/24/2020 with no significant coronary artery disease, elevated LVEDP 24 mmHg   RHC 08/31/2023: RA 9; PA 41/22/30; Amina CO/CI 3.04/1.61   CMR 08/31/2023: Severely dilated LV with global systolic function severely reduced, LVEF 18%, severe diffuse hypokinesis, RV with normal cavity size and normal global systolic function, RVEF 53%  Cardiopulmonary Stress Test 09/01: Peak VO2 12.70 ml/kg/min; VE/VCO2 59.11; RER 0.99      Volume: hypervolemic, ascites   Diuretic: Torsemide 100 mg BID (hold, ION)  ACE-I/ARB/ARNi: Entresto  mg BID (hold, ION)   BB: Carvedilol 3.125 mg BID (will  hold in setting of low CI)   Aldosterone antagonist: Spironolactone 25 mg (hold, ION)  SGLT2i: dapagliflozin 10 mg   Hydral/Nitrates: hydralazine 25 TID   SCD prophylaxis: ICD 11/2022   Statin: Atorvastatin 20 mg        DATE MAP CVP PAP PCWP Amina CO Amina CI SVR MVo2 Therapies Weight    08/31/2023   9 41/22/30 19 3.04 1.61       172 lbs    10/2022   12 76/35/50 38 3.25 1.72       169 lbs      LVAD/Transplant Evaluation Checklist  [] Labs (CBC, CMP, PT/INR, cystatin C, prealbumin, UA + micro)  [] Infectious (Hep A/B/C, HIV, treponema, HSV 1/2 IgG, CMV IgG, Toxo IgG, EBV IgG, varicella IgG, Quant gold, COVID vaccine/PCR)  [] Utox/nicotine and cotinine/PeTH   [x] Immunocompatibility (last transfusion, ABO, HLA tissue typing, PRA)  [x] ECG, Echo - 08/31/2023: Severely dilated LV with  "global systolic function severely reduced, LVEF 18%, severe diffuse hypokinesis, RV with normal cavity size and normal global systolic function, RVEF 53%  [x] CPX 9/1/2023- Peak VO2 12.70 ml/kg/min; VE/VCO2 59.11; RER 0.99   [] 6MWT   [x] RHC - 8/31/2023: RA 9; PA 41/22/30; Amina CO/CI 3.04/1.61   [x] CVTS consult  [] Social work consult  [] Palliative care consult  [] Neuropsych consult  [x] Nutrition consult  [x] CT Dental   [] Dental consult  [x] Abd US + doppler  [x] Extremity US and ABIs  [x] Carotid US (if DM or ICM or >49yo)  [] PFTs  [] Dexa  [x] CT head non-contrast  [x] CT CAP non-contrast -  Sub-6 mm pulmonary nodules. Optional follow-up CT of the chest in  one year, if patient is high risk per Fleischner criteria  [] colonoscopy (>51 yo)  [x] HCG  [] mammogram (>39 yo)  [] Pap test     #Type 2 DM   Most recent Hgb A1c 7.8 on 08/10/2023  - Detemir 30 U at bedtime  - [facility] [place]      Aron Bañuelos MD  Internal Medicine-Pediatrics, PGY-3     ================================================================    Interval History/ROS:   -Was noted to be at baseline in the early morning and was walking around until mid morning when, \"pt unable to articulate name or date of birth. Speech was slow, slurred, and pt presented with difficulty of word finding. Throughout episode, pt continued to have purposeful, strong, equal strength bilaterally.\" Repeat CT Head without evidence of intracranial hemorrhage, stable appearance of patchy hypoattenuation of the left parietal periventricular white matter.     Medications: Reviewed in EPIC.     Physical Exam:   Temp:  [97.5  F (36.4  C)-98.2  F (36.8  C)] 97.6  F (36.4  C)  Pulse:  [] 75  Resp:  [10-21] 15  BP: ()/() 99/65  SpO2:  [94 %-99 %] 94 %      GEN: No acute distress   HEENT: EOMI, no icterus, moist mucous membranes   CV: RRR, normal s1/s2, no murmurs. JVP mid neck.   CHEST: Normal work of breathing. Lungs CTAB, no wheezes or crackles.   ABD: Soft, Non-tender. " Fluid wave and abdomenal distention present.    EXT: Warm and well-perfused, 1+ pitting LE edema  NEURO: Awake, alert, answering questions appropriately, motor grossly nonfocal  PSYCH: cooperative, affect appropriate    Data:   Latest Reference Range & Units 09/02/23 07:18   WBC 4.0 - 11.0 10e3/uL 6.7   Hemoglobin 11.7 - 15.7 g/dL 15.6   Hematocrit 35.0 - 47.0 % 45.6   Platelet Count 150 - 450 10e3/uL 234   RBC Count 3.80 - 5.20 10e6/uL 5.51 (H)   MCV 78 - 100 fL 83   MCH 26.5 - 33.0 pg 28.3   MCHC 31.5 - 36.5 g/dL 34.2      Latest Reference Range & Units 09/01/23 21:19   Color Urine Colorless, Straw, Light Yellow, Yellow  Light Yellow   Appearance Urine Clear  Slightly Cloudy !   Glucose Urine Negative mg/dL Negative   Bilirubin Urine Negative  Negative   Ketones Urine Negative mg/dL Negative   Specific Gravity Urine 1.003 - 1.035  1.011   pH Urine 5.0 - 7.0  5.0   Protein Albumin Urine Negative mg/dL Negative   Urobilinogen mg/dL Normal, 2.0 mg/dL Normal   Nitrite Urine Negative  Negative   Blood Urine Negative  Negative   Leukocyte Esterase Urine Negative  Small !   WBC Urine <=5 /HPF 5   RBC Urine <=2 /HPF 1   Squamous Epithelial /HPF Urine <=1 /HPF 6 (H)   Transitional Epi <=1 /HPF <1   Mucus Urine None Seen /LPF Present !   Hyaline Casts <=2 /LPF 7 (H)   Granular Casts None Seen /LPF 1 (H)     Imaging:    CT Head w/o Contrast (09/03/2023)  MPRESSION:   1. No CT evidence of intracranial hemorrhage.  2. Stable appearance of patchy hypoattenuation of the left parietal  periventricular white matter.     CT Head w/o Contrast (09/02/2023)  Impression:   1. No evidence of intracranial hemorrhage.  2. Head CTA demonstrates no aneurysm or stenosis of the major  intracranial arteries.   3. Neck CTA demonstrates no stenosis of the major cervical arteries.  Mild atherosclerotic changes of bilateral carotid bifurcations.  4. Mild patchy low attenuation within the left parietal white matter;   stable from yesterday's study;  this might represent old infarct or  gliotic changes. However may consider MRI to exclude acute infarct  given the limitations of C    CTA Head Neck (09/02/2023)  Impression:   1. No evidence of intracranial hemorrhage.  2. Head CTA demonstrates no aneurysm or stenosis of the major  intracranial arteries.   3. Neck CTA demonstrates no stenosis of the major cervical arteries.  Mild atherosclerotic changes of bilateral carotid bifurcations.  4. Mild patchy low attenuation within the left parietal white matter;   stable from yesterday's study; this might represent old infarct or  gliotic changes. However may consider MRI to exclude acute infarct  given the limitations of CT.     Chest CT 9/1/2023  IMPRESSION:   1. No acute or suspicious abnormalities in the chest, abdomen, or  pelvis.  2. Sub-6 mm pulmonary nodules. Optional follow-up CT of the chest in  one year, if patient is high risk per Fleischner criteria    CT Head w/o contrast 9/1/2023  No suspicious intracranial finding. Subtle patchy  hypodensities in the left parietal subcortical white matter, likely  related to gliosis from underlying chronic small vessel disease.    CT Dental wo contrast 9/1/2023  Multifocal scattered dental caries and dental  reconstruction. Most notably there is a large cavity in the left  maxillary first premolar tooth, associated with periapical lucency and  dehiscence of the adjacent outer maxillary cortical bone, compatible  with periapical periodontitis.    CXR 9/1/2023  ICD in place.    US BI Doppler 9/1/2023  1. RIGHT:       A. Resting MARLENE is normal, 1.18.     2. LEFT:       A. Resting MARLENE is normal, 1.11.    Upper Extremity US 9/1/2023  RIGHT:  Subclavian artery, medial: 64/0 cm/s, triphasic, 10.6 mm  Subclavian artery, mid: 62/0 cm/s, triphasic, 8.7 mm  Subclavian artery, lateral: 62/0 cm/s, triphasic, 9.1 mm     Axillary artery: 56/0 cm/s, triphasic, 8.2 mm     LEFT:  Subclavian artery, medial: 76/0 cm/s, triphasic, 8.5  mm  Subclavian artery, mid: 105/0 cm/s, triphasic, 8.6 mm  Subclavian artery, lateral: 71/0 cm/s, triphasic, 7.9 mm     Axillary artery: 69/0 cm/s, triphasic, 8.1 mm                                                                      IMPRESSION: Patent bilateral subclavian and axillary arteries with  measurements as in the report.    Carotid US 9/1/2023  RIGHT ICA: Less than 50% diameter narrowing by grayscale imaging  and sonographic velocity criteria.     LEFT ICA:  Less than 50% diameter narrowing by grayscale imaging  and sonographic velocity criteria

## 2023-09-03 NOTE — PLAN OF CARE
Major Shift Events: Prior to event, NIH score 0. at 1034, pt presented with slurred, slow speech and inability to answer A&O questions. Otherwise neuros remained intact. Post event, NIH score 2. Neurocrit immediately at bedside, STAT CT scan ordered. Pt still able to communicate via writing, able to answer all A&O questions via writing, can still understand all communication.     Neuro: Neurologically completely intact except for aforementioned severe expressive aphasia (except for being able to completely communicate via writing). R pupil continues to be > L pupil. PRN Atarax given x2 for anxiety. Standby assist talking aroudn unit x3.     CV: NSR 90-110s. Diaphoretic but afebrile. Permanent ICU in place. Normotensive. Qtc on bedside monitor showing prolongation of 620 ms. 12-lead EKG taken showing Qtc of 489ms. BMP, mag and creatinine taken.     Resp: LS clear on RA.     GI/: Small, bite sized diet. Voiding in toilet independently. 1 BM this shift.     Access: R neck cath site WNL. PIV x2 on R. Both saline locked.    Plan: Transfer to  pending bed availability.     Jes Antonio, RN on 9/3/2023 at 6:30 PM

## 2023-09-04 LAB
ANION GAP SERPL CALCULATED.3IONS-SCNC: 15 MMOL/L (ref 7–15)
BUN SERPL-MCNC: 49.9 MG/DL (ref 6–20)
CALCIUM SERPL-MCNC: 9.3 MG/DL (ref 8.6–10)
CHLORIDE SERPL-SCNC: 99 MMOL/L (ref 98–107)
CREAT SERPL-MCNC: 1.91 MG/DL (ref 0.51–0.95)
DEPRECATED HCO3 PLAS-SCNC: 23 MMOL/L (ref 22–29)
ERYTHROCYTE [DISTWIDTH] IN BLOOD BY AUTOMATED COUNT: 14 % (ref 10–15)
GFR SERPL CREATININE-BSD FRML MDRD: 32 ML/MIN/1.73M2
GLUCOSE BLDC GLUCOMTR-MCNC: 127 MG/DL (ref 70–99)
GLUCOSE BLDC GLUCOMTR-MCNC: 135 MG/DL (ref 70–99)
GLUCOSE BLDC GLUCOMTR-MCNC: 149 MG/DL (ref 70–99)
GLUCOSE BLDC GLUCOMTR-MCNC: 211 MG/DL (ref 70–99)
GLUCOSE BLDC GLUCOMTR-MCNC: 278 MG/DL (ref 70–99)
GLUCOSE SERPL-MCNC: 206 MG/DL (ref 70–99)
HCT VFR BLD AUTO: 43.7 % (ref 35–47)
HGB BLD-MCNC: 14.3 G/DL (ref 11.7–15.7)
MAGNESIUM SERPL-MCNC: 2.9 MG/DL (ref 1.7–2.3)
MCH RBC QN AUTO: 27.4 PG (ref 26.5–33)
MCHC RBC AUTO-ENTMCNC: 32.7 G/DL (ref 31.5–36.5)
MCV RBC AUTO: 84 FL (ref 78–100)
PLATELET # BLD AUTO: 212 10E3/UL (ref 150–450)
POTASSIUM SERPL-SCNC: 3.7 MMOL/L (ref 3.4–5.3)
RBC # BLD AUTO: 5.21 10E6/UL (ref 3.8–5.2)
SODIUM SERPL-SCNC: 137 MMOL/L (ref 136–145)
WBC # BLD AUTO: 6.4 10E3/UL (ref 4–11)

## 2023-09-04 PROCEDURE — 250N000013 HC RX MED GY IP 250 OP 250 PS 637: Performed by: INTERNAL MEDICINE

## 2023-09-04 PROCEDURE — 250N000013 HC RX MED GY IP 250 OP 250 PS 637: Performed by: NURSE PRACTITIONER

## 2023-09-04 PROCEDURE — 250N000011 HC RX IP 250 OP 636: Mod: JZ | Performed by: INTERNAL MEDICINE

## 2023-09-04 PROCEDURE — 99233 SBSQ HOSP IP/OBS HIGH 50: CPT | Mod: GC | Performed by: INTERNAL MEDICINE

## 2023-09-04 PROCEDURE — 83735 ASSAY OF MAGNESIUM: CPT | Performed by: INTERNAL MEDICINE

## 2023-09-04 PROCEDURE — 250N000013 HC RX MED GY IP 250 OP 250 PS 637

## 2023-09-04 PROCEDURE — 99232 SBSQ HOSP IP/OBS MODERATE 35: CPT | Performed by: NURSE PRACTITIONER

## 2023-09-04 PROCEDURE — 99222 1ST HOSP IP/OBS MODERATE 55: CPT | Performed by: PSYCHIATRY & NEUROLOGY

## 2023-09-04 PROCEDURE — 200N000002 HC R&B ICU UMMC

## 2023-09-04 PROCEDURE — 80048 BASIC METABOLIC PNL TOTAL CA: CPT | Performed by: INTERNAL MEDICINE

## 2023-09-04 PROCEDURE — 85027 COMPLETE CBC AUTOMATED: CPT | Performed by: INTERNAL MEDICINE

## 2023-09-04 PROCEDURE — 250N000011 HC RX IP 250 OP 636: Performed by: NURSE PRACTITIONER

## 2023-09-04 PROCEDURE — 36415 COLL VENOUS BLD VENIPUNCTURE: CPT | Performed by: INTERNAL MEDICINE

## 2023-09-04 RX ORDER — TRAZODONE HYDROCHLORIDE 50 MG/1
50 TABLET, FILM COATED ORAL
Status: DISCONTINUED | OUTPATIENT
Start: 2023-09-04 | End: 2023-09-11 | Stop reason: HOSPADM

## 2023-09-04 RX ORDER — HYDRALAZINE HYDROCHLORIDE 20 MG/ML
20 INJECTION INTRAMUSCULAR; INTRAVENOUS EVERY 6 HOURS PRN
Status: DISCONTINUED | OUTPATIENT
Start: 2023-09-04 | End: 2023-09-11 | Stop reason: HOSPADM

## 2023-09-04 RX ORDER — NALOXONE HYDROCHLORIDE 0.4 MG/ML
0.2 INJECTION, SOLUTION INTRAMUSCULAR; INTRAVENOUS; SUBCUTANEOUS
Status: DISCONTINUED | OUTPATIENT
Start: 2023-09-04 | End: 2023-09-11 | Stop reason: HOSPADM

## 2023-09-04 RX ORDER — HYDRALAZINE HYDROCHLORIDE 50 MG/1
50 TABLET, FILM COATED ORAL EVERY 6 HOURS
Status: DISCONTINUED | OUTPATIENT
Start: 2023-09-04 | End: 2023-09-07

## 2023-09-04 RX ORDER — NALOXONE HYDROCHLORIDE 0.4 MG/ML
0.4 INJECTION, SOLUTION INTRAMUSCULAR; INTRAVENOUS; SUBCUTANEOUS
Status: DISCONTINUED | OUTPATIENT
Start: 2023-09-04 | End: 2023-09-11 | Stop reason: HOSPADM

## 2023-09-04 RX ORDER — HYDROXYZINE HYDROCHLORIDE 25 MG/1
25 TABLET, FILM COATED ORAL EVERY 6 HOURS PRN
Status: DISCONTINUED | OUTPATIENT
Start: 2023-09-04 | End: 2023-09-11 | Stop reason: HOSPADM

## 2023-09-04 RX ORDER — OXYCODONE HYDROCHLORIDE 5 MG/1
5 TABLET ORAL EVERY 6 HOURS PRN
Status: DISCONTINUED | OUTPATIENT
Start: 2023-09-04 | End: 2023-09-11 | Stop reason: HOSPADM

## 2023-09-04 RX ORDER — DEXTROSE MONOHYDRATE 25 G/50ML
25-50 INJECTION, SOLUTION INTRAVENOUS
Status: DISCONTINUED | OUTPATIENT
Start: 2023-09-04 | End: 2023-09-04

## 2023-09-04 RX ORDER — NICOTINE POLACRILEX 4 MG
15-30 LOZENGE BUCCAL
Status: DISCONTINUED | OUTPATIENT
Start: 2023-09-04 | End: 2023-09-04

## 2023-09-04 RX ORDER — HYDRALAZINE HYDROCHLORIDE 20 MG/ML
20 INJECTION INTRAMUSCULAR; INTRAVENOUS EVERY 6 HOURS PRN
Status: DISCONTINUED | OUTPATIENT
Start: 2023-09-04 | End: 2023-09-04

## 2023-09-04 RX ADMIN — HYDRALAZINE HYDROCHLORIDE 20 MG: 20 INJECTION INTRAMUSCULAR; INTRAVENOUS at 16:43

## 2023-09-04 RX ADMIN — HYDROXYZINE HYDROCHLORIDE 50 MG: 25 TABLET, FILM COATED ORAL at 01:41

## 2023-09-04 RX ADMIN — HYDRALAZINE HYDROCHLORIDE 50 MG: 50 TABLET, FILM COATED ORAL at 20:03

## 2023-09-04 RX ADMIN — HYDRALAZINE HYDROCHLORIDE 25 MG: 25 TABLET, FILM COATED ORAL at 14:26

## 2023-09-04 RX ADMIN — HYDRALAZINE HYDROCHLORIDE 25 MG: 25 TABLET, FILM COATED ORAL at 08:21

## 2023-09-04 RX ADMIN — HYDROXYZINE HYDROCHLORIDE 25 MG: 25 TABLET, FILM COATED ORAL at 15:23

## 2023-09-04 RX ADMIN — HEPARIN SODIUM 5000 UNITS: 5000 INJECTION, SOLUTION INTRAVENOUS; SUBCUTANEOUS at 08:21

## 2023-09-04 RX ADMIN — ATORVASTATIN CALCIUM 80 MG: 80 TABLET, FILM COATED ORAL at 20:03

## 2023-09-04 RX ADMIN — ACETAMINOPHEN 650 MG: 325 TABLET, FILM COATED ORAL at 13:08

## 2023-09-04 RX ADMIN — INSULIN DETEMIR 40 UNITS: 100 INJECTION, SOLUTION SUBCUTANEOUS at 21:37

## 2023-09-04 RX ADMIN — TRAZODONE HYDROCHLORIDE 50 MG: 50 TABLET ORAL at 21:37

## 2023-09-04 RX ADMIN — HYDROXYZINE HYDROCHLORIDE 50 MG: 25 TABLET, FILM COATED ORAL at 08:21

## 2023-09-04 RX ADMIN — HEPARIN SODIUM 5000 UNITS: 5000 INJECTION, SOLUTION INTRAVENOUS; SUBCUTANEOUS at 01:41

## 2023-09-04 RX ADMIN — HEPARIN SODIUM 5000 UNITS: 5000 INJECTION, SOLUTION INTRAVENOUS; SUBCUTANEOUS at 17:49

## 2023-09-04 RX ADMIN — OXYCODONE HYDROCHLORIDE 5 MG: 5 TABLET ORAL at 14:33

## 2023-09-04 RX ADMIN — SERTRALINE HYDROCHLORIDE 100 MG: 50 TABLET ORAL at 08:21

## 2023-09-04 RX ADMIN — ACETAMINOPHEN 650 MG: 325 TABLET, FILM COATED ORAL at 17:52

## 2023-09-04 RX ADMIN — ACETAMINOPHEN 650 MG: 325 TABLET, FILM COATED ORAL at 08:22

## 2023-09-04 ASSESSMENT — ACTIVITIES OF DAILY LIVING (ADL)
ADLS_ACUITY_SCORE: 20

## 2023-09-04 ASSESSMENT — VISUAL ACUITY: OU: BASELINE

## 2023-09-04 NOTE — PROGRESS NOTES
Neurocritical Care Progress Note    Reason for critical care admission: Acute HFrEF   Admitting Team: Cardiology    Date of Service:  09/04/2023  Date of Admission:  8/31/2023  Hospital Day: 5    Assessment/Plan  Jamilah Jiménez is a 47-year-old female with HFrEF, Class III, Stage C-D, DM2, HTN, PE admitted following RHC with CI 1.61, who has been admitted since 8/31 for further evaluation and consideration for advanced heart failure therapies. She developed acute-onset expressive aphasia and right-sided weakness/numbness on 9/2, with LKW at 1040. Initial NIHSS of 7 for a right homonomous hemianopia, RUE and RLE drift, sensory loss over the right hemibody, mild expressive aphasia, and mild dysarthria. CTH was negative for acute bleeding, but did show an area of hypodensity in the left parietal lobe consistent with old stroke. CTA was negative for LVO. Given a clinical syndrome consistent with acute ischemic stroke localizing to the left frontal and parietal lobes, and an absence of contraindications to thrombolytic therapy, tenecteplase (TNK) was administered at 1142 on 9/2. Patient was transferred to the neurocritical care unit for further management. Stroke mechanism is undetermined at this time, but patient does have multiple risk factors for cardioembolic processes given her severely reduced EF.    24 hour events: NO acute events overnight.    Neuro  #Acute onset aphasia, transient    #Concern for acute ischemic stroke of the left cerebral hemisphere s/p TNK administration, uncertain mechanism at this time  -Neuro checks Q4h  -Statin: Atorvastatin 80 mg QD  -Repeat CT head without hemorrhage post TNK  -MRI Brain without contrast ordered (needs coordination with EP device nurse)  -Patient allergic to Aspirin, hold on antiplatelet/anticoagulation pending MR brain results and future need of transplant and/or LVAD placement if needed  -A1c (9/2) = 9.4%  -LDL (9/2) = 137  -PT/OT  -Stroke Education  -Depression  Screen  -Apnea screening questions      TIME SPENT ON THIS ENCOUNTER   I spent 45 minutes of my time on the unit/floor managing the care of Jamilah Jiménez excluding time performing procedures. Upon evaluation, this patient had an acute onset of aphasia s/p TNK which required my direct attention, intervention, and personal management. Greater than 50% of my time was spent at the bedside counseling the patient and/or coordinating care including chart review, history, exam, documentation, and further activities per this note. I have personally reviewed the following data/imaging over the past 24 hours.     The patient was discussed with the NCC attending, Dr. Mercado.    Niesha ORELLANA CNP  Neurocritical care nurse practitioner  Pager: [phone]  Ascom: [phone] [facility] [phone] [facility] [phone]     24 Hour Vital Signs Summary:  Temp: 97.9  F (36.6  C) Temp  Min: 97.9  F (36.6  C)  Max: 98.5  F (36.9  C)  Resp: 16 Resp  Min: 13  Max: 27  SpO2: 96 % SpO2  Min: 86 %  Max: 99 %  Pulse: 82 Pulse  Min: 72  Max: 96  BP: 116/73 Systolic (24hrs), Av , Min:94 , Max:134   Diastolic (24hrs), Av, Min:53, Max:87    Respiratory monitoring:   Resp: 16  Room air     I/O last 3 completed shifts:  In:  [P.O.:1792; I.V.:10]  Out: -     Current Medications:   atorvastatin  80 mg Oral or Feeding Tube At Bedtime    [Held by provider] dapagliflozin  10 mg Oral or Feeding Tube QAM    heparin ANTICOAGULANT  5,000 Units Subcutaneous Q8H    hydrALAZINE  25 mg Oral or Feeding Tube TID    insulin aspart  1-10 Units Subcutaneous TID AC    insulin aspart  1-7 Units Subcutaneous At Bedtime    insulin detemir  40 Units Subcutaneous At Bedtime    polyethylene glycol  17 g Oral or Feeding Tube Daily    [Held by provider] potassium chloride  60 mEq Oral or Feeding Tube TID    [Held by provider] sacubitril-valsartan  1 tablet Oral BID    senna-docusate  1-2 tablet Oral or Feeding Tube BID    sertraline  100 mg Oral or Feeding Tube Daily  "   sodium chloride (PF)  3 mL Intracatheter Q8H    [Held by provider] spironolactone  50 mg Oral or Feeding Tube QAM    [Held by provider] torsemide  100 mg Oral or Feeding Tube BID       PRN Medications:  acetaminophen, - MEDICATION INSTRUCTIONS -, glucose **OR** dextrose **OR** glucagon, HOLD MEDICATION, hydrALAZINE, hydrOXYzine **OR** hydrOXYzine, labetalol, lidocaine 4%, lidocaine (buffered or not buffered), - MEDICATION INSTRUCTIONS -, menthol (Topical Analgesic) 2.5%, ondansetron, prochlorperazine, BETA BLOCKER NOT PRESCRIBED, sodium chloride (PF)    Infusions:   - MEDICATION INSTRUCTIONS -      - MEDICATION INSTRUCTIONS -      BETA BLOCKER NOT PRESCRIBED         Allergies   Allergen Reactions    Aspirin Hives     Told nursing on 6/1/22 she breaks out in hives if she takes aspirin.    Ibuprofen Hives    Lisinopril Cough    Oxycodone-Acetaminophen Nausea and Vomiting       Physical Examination:  Vitals: /73   Pulse 82   Temp 97.9  F (36.6  C) (Oral)   Resp 16   Ht 1.702 m (5' 7.01\")   Wt 78.2 kg (172 lb 6.4 oz)   SpO2 96%   BMI 27.00 kg/m    General: Adult female patient, lying in bed  HEENT: Normocephalic, atraumatic, no icterus  Cardiac: Sinus rhythm on bedside monitor   Pulm: Unlabored, expansion symmetric, no retractions or use of accessory muscles  Abdomen: Soft, non-distended abdomen   Extremities: Warm, no edema, appears adequately perfused  Skin: No rash or lesion observed on exposed skin   Psych: Calm and cooperative  Neuro:  Mental status: Awake, alert, attentive, oriented to self, time, place, and circumstance. Language is fluent and coherent with intact comprehension of complex commands, naming and repetition.  Cranial nerves: VFF, PERRL, conjugate gaze, EOMI, facial sensation intact, face symmetric, shoulder shrug strong, tongue midline, no dysarthria.   Motor: Normal bulk and tone. No abnormal movements. 5/5 strength in 4/4 extremities.   Sensory: Intact to light touch x 4 " extremities  Coordination: FNF and HS without ataxia or dysmetria.   Gait: REBECCA, deferred.    National [employer] Stroke Scale  Exam Interval: 0900   Score    Level of consciousness: (0)   Alert, keenly responsive    LOC questions: (0)   Answers both questions correctly    LOC commands: (0)   Performs both tasks correctly    Best gaze: (0)   Normal    Visual: (0)   No visual loss    Facial palsy: (0)   Normal symmetrical movements    Motor arm (left): (0)   No drift    Motor arm (right): (0)   No drift    Motor leg (left): (0)   No drift    Motor leg (right): (0)   No drift    Limb ataxia: (0)   Absent    Sensory: (0)   Normal- no sensory loss    Best language: (0)   Normal- no aphasia    Dysarthria: (0)   Normal    Extinction and inattention: (0)   No abnormality        Total Score:  0      Labs and Imaging:    Results for orders placed or performed during the hospital encounter of 23 (from the past 24 hour(s))   Echo Limited   Result Value Ref Range    LVEF  15-20% (severely reduced)     Narrative    [id number]  [id number]  [id number]  [id number]^JANYA     [facility],[place]  Echocardiography Laboratory  [address] [place] [address]     Name: RITO CHRISTY  MRN: [id number]  : 1975  Study Date: 2023 09:42 AM  Age: 47 yrs  Gender: Female  Patient Location: [facility]  Reason For Study: CVA  Ordering Physician: JANAY MEDINA  Referring Physician: DELFINA COOPER  Performed By: Luis Alberto Mccoy     BSA: 1.9 m2  Height: 67 in  Weight: 168 lb  HR: 91  BP: 97/74 mmHg  ______________________________________________________________________________  Procedure  Limited Portable Echo Adult. Contrast Optison. Optison (NDC #8504-8424-16)  given intravenously. Patient was given 6 ml mixture of 3 ml Optison and 6 ml  saline. 3 ml wasted.  ______________________________________________________________________________  Interpretation Summary  No cardiac source of  embolus identified.  Severe left ventricular dilation is present. Left ventricular function is  decreased. The ejection fraction is 15-20% (severely reduced).  Global right ventricular function is normal.  No pericardial effusion is present.  This study was compared with the study from 8/31/23: No significant changes  noted.     ______________________________________________________________________________  Left Ventricle  Severe left ventricular dilation is present. Left ventricular function is  decreased. The ejection fraction is 15-20% (severely reduced). Severe diffuse  hypokinesis is present. There is no thrombus seen in the left ventricle.     Right Ventricle  The right ventricle is normal size. Global right ventricular function is  normal. A pacemaker lead is noted in the right ventricle.     Atria  The atria cannot be assessed.     Mitral Valve  The mitral valve is normal.     Aortic Valve  The valve leaflets are not well visualized. On Doppler interrogation, there is  no significant stenosis or regurgitation.     Vessels  The inferior vena cava was normal in size with preserved respiratory  variability. IVC diameter <2.1 cm collapsing >50% with sniff suggests a normal  RA pressure of 3 mmHg.     Pericardium  No pericardial effusion is present.     Compared to Previous Study  This study was compared with the study from 8/31/23 . No significant changes  noted.     ______________________________________________________________________________  MMode/2D Measurements & Calculations  IVSd: 1.2 cm  LVIDd: 6.2 cm  LVIDs: 5.5 cm  LVPWd: 0.92 cm  FS: 12.0 %  LV mass(C)d: 278.5 grams  LV mass(C)dI: 148.3 grams/m2  RWT: 0.29     ______________________________________________________________________________  Report approved by: France Roe 09/03/2023 02:38 PM         CT Head w/o Contrast    Narrative    EXAM: CT HEAD W/O CONTRAST  9/3/2023 10:50 AM     HISTORY: acute onset aphasia s/p TNK on 9/2        COMPARISON: 9/2/2023    TECHNIQUE: Using multidetector thin collimation helical acquisition  technique, axial, coronal and sagittal CT images from the skull base  to the vertex were obtained without intravenous contrast.   (topogram) image(s) also obtained and reviewed.    FINDINGS:  No acute intracranial hemorrhage, mass effect, or midline shift. No  acute loss of gray-white matter differentiation in the cerebral  hemispheres. Mild patchy hypoattenuation of the left parietal  periventricular white matter, similar to prior. Ventricles are  proportionate to the cerebral sulci. Clear basal cisterns.    The bony calvaria and the bones of the skull base are normal. The  visualized portions of the paranasal sinuses and mastoid air cells are  clear. Grossly normal orbits.       Impression    IMPRESSION:   1. No CT evidence of intracranial hemorrhage.  2. Stable appearance of patchy hypoattenuation of the left parietal  periventricular white matter.     I have personally reviewed the examination and initial interpretation  and I agree with the findings.    JACOB BARTH MD         SYSTEM ID:  [id number]   Glucose by meter   Result Value Ref Range    GLUCOSE BY METER POCT 335 (H) 70 - 99 mg/dL   EKG 12-lead, complete   Result Value Ref Range    Systolic Blood Pressure  mmHg    Diastolic Blood Pressure  mmHg    Ventricular Rate 80 BPM    Atrial Rate 80 BPM    SD Interval 178 ms    QRS Duration 112 ms     ms    QTc 489 ms    P Axis 41 degrees    R AXIS -38 degrees    T Axis 90 degrees    Interpretation ECG       Sinus rhythm  Possible Left atrial enlargement  Left axis deviation  Left ventricular hypertrophy ( R in aVL , Carson product )  Prolonged QT  Abnormal ECG  When compared with ECG of 31-AUG-2023 21:01,  QRS duration has increased  ST no longer depressed in Inferior leads     Glucose by meter   Result Value Ref Range    GLUCOSE BY METER POCT 266 (H) 70 - 99 mg/dL   Basic metabolic panel   Result Value Ref  Range    Sodium 134 (L) 136 - 145 mmol/L    Potassium 3.2 (L) 3.4 - 5.3 mmol/L    Chloride 93 (L) 98 - 107 mmol/L    Carbon Dioxide (CO2) 21 (L) 22 - 29 mmol/L    Anion Gap 20 (H) 7 - 15 mmol/L    Urea Nitrogen 51.2 (H) 6.0 - 20.0 mg/dL    Creatinine 2.24 (H) 0.51 - 0.95 mg/dL    Calcium 9.6 8.6 - 10.0 mg/dL    Glucose 278 (H) 70 - 99 mg/dL    GFR Estimate 27 (L) >60 mL/min/1.73m2   Magnesium   Result Value Ref Range    Magnesium 2.6 (H) 1.7 - 2.3 mg/dL   Glucose by meter   Result Value Ref Range    GLUCOSE BY METER POCT 167 (H) 70 - 99 mg/dL   Magnesium   Result Value Ref Range    Magnesium 2.9 (H) 1.7 - 2.3 mg/dL   Basic metabolic panel   Result Value Ref Range    Sodium 137 136 - 145 mmol/L    Potassium 3.7 3.4 - 5.3 mmol/L    Chloride 99 98 - 107 mmol/L    Carbon Dioxide (CO2) 23 22 - 29 mmol/L    Anion Gap 15 7 - 15 mmol/L    Urea Nitrogen 49.9 (H) 6.0 - 20.0 mg/dL    Creatinine 1.91 (H) 0.51 - 0.95 mg/dL    Calcium 9.3 8.6 - 10.0 mg/dL    Glucose 206 (H) 70 - 99 mg/dL    GFR Estimate 32 (L) >60 mL/min/1.73m2   CBC with platelets   Result Value Ref Range    WBC Count 6.4 4.0 - 11.0 10e3/uL    RBC Count 5.21 (H) 3.80 - 5.20 10e6/uL    Hemoglobin 14.3 11.7 - 15.7 g/dL    Hematocrit 43.7 35.0 - 47.0 %    MCV 84 78 - 100 fL    MCH 27.4 26.5 - 33.0 pg    MCHC 32.7 31.5 - 36.5 g/dL    RDW 14.0 10.0 - 15.0 %    Platelet Count 212 150 - 450 10e3/uL   Glucose by meter   Result Value Ref Range    GLUCOSE BY METER POCT 149 (H) 70 - 99 mg/dL        All relevant imaging and laboratory values personally reviewed.

## 2023-09-04 NOTE — PLAN OF CARE
"ICU End of Shift Summary. See flowsheets for vital signs and detailed assessment.    Changes this shift: AOVSS on room air. As of 0200 pt is no longer aphasic and speaking without any obvious deficits. PRN Atarax 50mg given x2 for complaints of severe anxiety with relief. Ambulated in hallway without difficulty. Voids spontaneously via toilet, adequate UOP. No BM per this shift. 1,528mL left of 2L fluid restriction.   0645 more labs pending.     Plan: Psych consult? Transfer to lower level of care upon availability. Will contact primary care team with questions and concerns.     Nancy Sanford RN   Critical Care Flex Team     Goal Outcome Evaluation:      Plan of Care Reviewed With: patient    Overall Patient Progress: improvingOverall Patient Progress: improving    Outcome Evaluation: AOVSS. Sleeping between cares. Severly aphasic betweeen 2000-0200, resolved following 0200 assessment    Problem: Plan of Care - These are the overarching goals to be used throughout the patient stay.    Goal: Plan of Care Review  Description: The Plan of Care Review/Shift note should be completed every shift.  The Outcome Evaluation is a brief statement about your assessment that the patient is improving, declining, or no change.  This information will be displayed automatically on your shift note.  Outcome: Progressing  Flowsheets (Taken 9/4/2023 0432)  Outcome Evaluation: AOVSS. Sleeping between cares. Severly aphasic betweeen 2000-0200, resolved following 0200 assessment  Overall Patient Progress: improving  Goal: Patient-Specific Goal (Individualized)  Description: You can add care plan individualizations to a care plan. Examples of Individualization might be:  \"Parent requests to be called daily at 9am for status\", \"I have a hard time hearing out of my right ear\", or \"Do not touch me to wake me up as it startles me\".  Outcome: Progressing  Goal: Absence of Hospital-Acquired Illness or Injury  Outcome: Progressing  Intervention: " Identify and Manage Fall Risk  Recent Flowsheet Documentation  Taken 9/4/2023 0400 by Nancy Sanford RN  Safety Promotion/Fall Prevention:   activity supervised   assistive device/personal items within reach   clutter free environment maintained   increased rounding and observation   increase visualization of patient   lighting adjusted   nonskid shoes/slippers when out of bed   patient and family education   room door open   room near nurse's station   room organization consistent   safety round/check completed   supervised activity   treat reversible contributory factors  Taken 9/4/2023 0000 by Nancy Sanford RN  Safety Promotion/Fall Prevention:   activity supervised   assistive device/personal items within reach   clutter free environment maintained   increased rounding and observation   increase visualization of patient   lighting adjusted   nonskid shoes/slippers when out of bed   patient and family education   room door open   room near nurse's station   room organization consistent   safety round/check completed   supervised activity   treat reversible contributory factors  Taken 9/3/2023 2000 by Nancy Sanford RN  Safety Promotion/Fall Prevention:   activity supervised   assistive device/personal items within reach   clutter free environment maintained   increased rounding and observation   increase visualization of patient   lighting adjusted   nonskid shoes/slippers when out of bed   patient and family education   room door open   room near nurse's station   room organization consistent   safety round/check completed   supervised activity   treat reversible contributory factors  Intervention: Prevent Skin Injury  Recent Flowsheet Documentation  Taken 9/4/2023 0400 by Nancy Sanford RN  Body Position: position changed independently  Taken 9/4/2023 0200 by Nancy Sanford RN  Body Position: position changed independently  Taken 9/4/2023 0000 by Nancy Sanford RN  Body Position: position changed  independently  Taken 9/3/2023 2200 by Nancy Sanford RN  Body Position: position changed independently  Taken 9/3/2023 2000 by Nancy Sanford RN  Body Position: position changed independently  Intervention: Prevent and Manage VTE (Venous Thromboembolism) Risk  Recent Flowsheet Documentation  Taken 9/4/2023 0400 by Nancy Sanford RN  VTE Prevention/Management: (ordered, awaiting arrival) other (see comments)  Taken 9/4/2023 0000 by Nancy Sanford RN  VTE Prevention/Management: (ordered, awaiting arrival) other (see comments)  Taken 9/3/2023 2000 by Nancy Sanford RN  VTE Prevention/Management: (ordered, awaiting arrival) other (see comments)  Goal: Optimal Comfort and Wellbeing  Outcome: Progressing  Intervention: Monitor Pain and Promote Comfort  Recent Flowsheet Documentation  Taken 9/4/2023 0400 by Nancy Sanford RN  Pain Management Interventions:   medication (see MAR)   care clustered   emotional support   pain management plan reviewed with patient/caregiver   pillow support provided   quiet environment facilitated   relaxation techniques promoted   repositioned   rest  Taken 9/4/2023 0000 by Nancy Sanford RN  Pain Management Interventions:   medication (see MAR)   care clustered   emotional support   pain management plan reviewed with patient/caregiver   pillow support provided   quiet environment facilitated   relaxation techniques promoted   repositioned   rest  Taken 9/3/2023 2000 by Nancy Sanford RN  Pain Management Interventions:   medication (see MAR)   care clustered   emotional support   pain management plan reviewed with patient/caregiver   pillow support provided   quiet environment facilitated   relaxation techniques promoted   repositioned   rest  Intervention: Provide Person-Centered Care  Recent Flowsheet Documentation  Taken 9/4/2023 0400 by Nancy Sanford RN  Trust Relationship/Rapport:   care explained   choices provided   emotional support provided   empathic listening  provided   questions answered   questions encouraged   thoughts/feelings acknowledged   reassurance provided  Taken 9/4/2023 0000 by Nancy Sanford RN  Trust Relationship/Rapport:   care explained   choices provided   emotional support provided   empathic listening provided   questions answered   questions encouraged   thoughts/feelings acknowledged   reassurance provided  Taken 9/3/2023 2000 by Nancy Sanford RN  Trust Relationship/Rapport:   care explained   choices provided   emotional support provided   empathic listening provided   questions answered   questions encouraged   thoughts/feelings acknowledged   reassurance provided  Goal: Readiness for Transition of Care  Outcome: Progressing

## 2023-09-04 NOTE — PROGRESS NOTES
[facility]   Cardiology II Service / Advanced Heart Failure  Daily Progress Note  Date of Service: 9/1/2023      Patient: Jamilah Jiménez  MRN: [id number]  Admission Date: 8/31/2023  Hospital Day # 4    Assessment and Plan:  Jamilah Jiménez is a 47-year-old female with HFrEF, Class III, Stage C-D, DM2, HTN, PE admitted following RHC with CI 1.61. Admitted for further evaluation and consideration for advanced heart failure therapies.     9/4:  - consult psych for concerns of functional component of   - continue to hold diuretic and spironolactone given Cr trending down.       #Nausea  #Aphasia  #Right-sided numbness and weakness   On 09/02, she developed acute-onset expressive aphasia and right-sided weakness/numbness, CTH was negative for acute bleeding, but did show an area of hypodensity in the left parietal lobe consistent with old stroke. CTA was negative for LVO. Given a clinical syndrome consistent with acute ischemic stroke localizing to the left frontal and parietal lobes, and an absence of contraindications to thrombolytic therapy, tenecteplase (TNK) was administered. She was transferred to the neurocritical care unit for further management. Similar episode 09/03 where she was unable to articulate her name or date of birth with slow and slurred speech and word finding difficulty. Throughout the episode, was noted to have purposeful, strong, equal strength bilaterally. Repeat Head CT without evidence of intracranial hemorrhage, stable appearance of patchy hypoattenuation of the left parietal periventricular matter.   - seen by neurology   - MRI scan on [facility] pt has ICD   - Psych consulted for possible functional component of Sx.          #HFrEF (LVEF 18%), non-ischemic cardiomyopathy (genetic) s/p ICD  #HTN   #HLD   ACC/AHA Stage D, NYHA Symptom Class IV  Patient with progressively worsening dyspnea, edema, overall functional status over the last ~ 2 years. Suspected non-ischemic  cardiomyopathy in the setting of genetic cardiomyopathy. Has been on diuretics, GDMT, has ICD in place.     Primary Cardiologist: Dr. Saucedo Last seen 12/06/2022   Ischemic Eval: Coronary angiogram 12/24/2020 with no significant coronary artery disease, elevated LVEDP 24 mmHg   RHC 08/31/2023: RA 9; PA 41/22/30; Amina CO/CI 3.04/1.61   CMR 08/31/2023: Severely dilated LV with global systolic function severely reduced, LVEF 18%, severe diffuse hypokinesis, RV with normal cavity size and normal global systolic function, RVEF 53%  Cardiopulmonary Stress Test 09/01: Peak VO2 12.70 ml/kg/min; VE/VCO2 59.11; RER 0.99      Volume: hypervolemic, ascites   Diuretic: Torsemide 100 mg BID (held 09/03/23, ION)  ACE-I/ARB/ARNi: Entresto  mg BID (held on 09/03/23, ION)   BB: Carvedilol 3.125 mg BID (will  hold in setting of low CI)   Aldosterone antagonist: Spironolactone 25 mg (held 09/03/23, ION)  SGLT2i: dapagliflozin 10 mg   Hydral/Nitrates: hydralazine 25 TID   SCD prophylaxis: ICD 11/2022   Statin: Atorvastatin 80 mg        DATE MAP CVP PAP PCWP Amina CO Amina CI SVR MVo2 Therapies Weight    08/31/2023   9 41/22/30 19 3.04 1.61       172 lbs    10/2022   12 76/35/50 38 3.25 1.72       169 lbs      LVAD/Transplant Evaluation Checklist  [] Labs (CBC, CMP, PT/INR, cystatin C, prealbumin, UA + micro)  [] Infectious (Hep A/B/C, HIV, treponema, HSV 1/2 IgG, CMV IgG, Toxo IgG, EBV IgG, varicella IgG, Quant gold, COVID vaccine/PCR)  [] Utox/nicotine and cotinine/PeTH   [x] Immunocompatibility (last transfusion, ABO, HLA tissue typing, PRA)  [x] ECG, Echo - 08/31/2023: Severely dilated LV with global systolic function severely reduced, LVEF 18%, severe diffuse hypokinesis, RV with normal cavity size and normal global systolic function, RVEF 53%  [x] CPX 9/1/2023- Peak VO2 12.70 ml/kg/min; VE/VCO2 59.11; RER 0.99   [] 6MWT   [x] RH - 8/31/2023: RA 9; PA 41/22/30; Amina CO/CI 3.04/1.61   [x] CVTS consult  [] Social work consult  []  Palliative care consult  [] Neuropsych consult  [x] Nutrition consult  [x] CT Dental   [] Dental consult  [x] Abd US + doppler  [x] Extremity US and ABIs  [x] Carotid US (if DM or ICM or >49yo)  [] PFTs  [] Dexa  [x] CT head non-contrast  [x] CT CAP non-contrast -  Sub-6 mm pulmonary nodules. Optional follow-up CT of the chest in  one year, if patient is high risk per Fleischner criteria  [] colonoscopy (>51 yo)  [x] HCG  [] mammogram (>39 yo)  [] Pap test     #Type 2 DM   Most recent Hgb A1c 7.8 on 08/10/2023  - Detemir 30 U at bedtime  - [facility]      Rosa Maria Dunbar MD  PGY-2   Internal Medicine Resident     ================================================================    Interval History/ROS:   No acute event overnight. Per nursing had some word finding difficulty associated with anxiety that improved with Atarax overnight. No additional concerns noted today.     Medications: Reviewed in EPIC.     Physical Exam:   Temp:  [97.9  F (36.6  C)-98.5  F (36.9  C)] 97.9  F (36.6  C)  Pulse:  [72-96] 82  Resp:  [16-27] 16  BP: ()/(53-87) 116/73  SpO2:  [86 %-99 %] 96 %      GEN: No acute distress   HEENT: EOMI, no icterus, moist mucous membranes   CV: RRR, normal s1/s2, no murmurs. JVP mid neck.   CHEST: Normal work of breathing. Lungs CTAB, no wheezes or crackles.   ABD: Soft, Non-tender. Fluid wave and abdomenal distention present.    EXT: Warm and well-perfused, 1+ pitting LE edema  NEURO: Awake, alert, answering questions appropriately, motor grossly nonfocal  PSYCH: cooperative, affect appropriate    Data:  Lab Results   Component Value Date    WBC 6.4 09/04/2023    HGB 14.3 09/04/2023    HCT 43.7 09/04/2023     09/04/2023     09/04/2023    POTASSIUM 3.7 09/04/2023    CHLORIDE 99 09/04/2023    CO2 23 09/04/2023    BUN 49.9 (H) 09/04/2023    CR 1.91 (H) 09/04/2023     (H) 09/04/2023    NTBNPI 1,425 (H) 08/31/2023    AST 23 09/02/2023    ALT 13 09/02/2023    ALKPHOS 93 09/02/2023    BILITOTAL  1.0 09/02/2023    INR 1.15 09/03/2023           Lab Results   Component Value Date     09/04/2023     09/03/2023     09/03/2023    Lab Results   Component Value Date    CHLORIDE 99 09/04/2023    CHLORIDE 93 09/03/2023    CHLORIDE 97 09/03/2023    Lab Results   Component Value Date    BUN 49.9 09/04/2023    BUN 51.2 09/03/2023    BUN 45.2 09/03/2023      Lab Results   Component Value Date    POTASSIUM 3.7 09/04/2023    POTASSIUM 3.2 09/03/2023    POTASSIUM 3.9 09/03/2023    Lab Results   Component Value Date    CO2 23 09/04/2023    CO2 21 09/03/2023    CO2 19 09/03/2023    Lab Results   Component Value Date    CR 1.91 09/04/2023    CR 2.24 09/03/2023    CR 2.00 09/03/2023            Imaging:    CT Head w/o Contrast (09/03/2023)  MPRESSION:   1. No CT evidence of intracranial hemorrhage.  2. Stable appearance of patchy hypoattenuation of the left parietal  periventricular white matter.     CT Head w/o Contrast (09/02/2023)  Impression:   1. No evidence of intracranial hemorrhage.  2. Head CTA demonstrates no aneurysm or stenosis of the major  intracranial arteries.   3. Neck CTA demonstrates no stenosis of the major cervical arteries.  Mild atherosclerotic changes of bilateral carotid bifurcations.  4. Mild patchy low attenuation within the left parietal white matter;   stable from yesterday's study; this might represent old infarct or  gliotic changes. However may consider MRI to exclude acute infarct  given the limitations of C    CTA Head Neck (09/02/2023)  Impression:   1. No evidence of intracranial hemorrhage.  2. Head CTA demonstrates no aneurysm or stenosis of the major  intracranial arteries.   3. Neck CTA demonstrates no stenosis of the major cervical arteries.  Mild atherosclerotic changes of bilateral carotid bifurcations.  4. Mild patchy low attenuation within the left parietal white matter;   stable from yesterday's study; this might represent old infarct or  gliotic changes. However  may consider MRI to exclude acute infarct  given the limitations of CT.     Chest CT 9/1/2023  IMPRESSION:   1. No acute or suspicious abnormalities in the chest, abdomen, or  pelvis.  2. Sub-6 mm pulmonary nodules. Optional follow-up CT of the chest in  one year, if patient is high risk per Fleischner criteria    CT Head w/o contrast 9/1/2023  No suspicious intracranial finding. Subtle patchy  hypodensities in the left parietal subcortical white matter, likely  related to gliosis from underlying chronic small vessel disease.    CT Dental wo contrast 9/1/2023  Multifocal scattered dental caries and dental  reconstruction. Most notably there is a large cavity in the left  maxillary first premolar tooth, associated with periapical lucency and  dehiscence of the adjacent outer maxillary cortical bone, compatible  with periapical periodontitis.    CXR 9/1/2023  ICD in place.    US BI Doppler 9/1/2023  1. RIGHT:       A. Resting MARLENE is normal, 1.18.     2. LEFT:       A. Resting MARLENE is normal, 1.11.    Upper Extremity US 9/1/2023  RIGHT:  Subclavian artery, medial: 64/0 cm/s, triphasic, 10.6 mm  Subclavian artery, mid: 62/0 cm/s, triphasic, 8.7 mm  Subclavian artery, lateral: 62/0 cm/s, triphasic, 9.1 mm     Axillary artery: 56/0 cm/s, triphasic, 8.2 mm     LEFT:  Subclavian artery, medial: 76/0 cm/s, triphasic, 8.5 mm  Subclavian artery, mid: 105/0 cm/s, triphasic, 8.6 mm  Subclavian artery, lateral: 71/0 cm/s, triphasic, 7.9 mm     Axillary artery: 69/0 cm/s, triphasic, 8.1 mm                                                                      IMPRESSION: Patent bilateral subclavian and axillary arteries with  measurements as in the report.    Carotid US 9/1/2023  RIGHT ICA: Less than 50% diameter narrowing by grayscale imaging  and sonographic velocity criteria.     LEFT ICA:  Less than 50% diameter narrowing by grayscale imaging  and sonographic velocity criteria

## 2023-09-04 NOTE — PLAN OF CARE
ICU End of Shift Summary. See flowsheets for vital signs and detailed assessment. Handoff report given to oncoming RN.     Major Events: AO x 4, anxiety throughout shift- PRN atarax given. R pupil slightly larger than L. Slow, slightly slurred speech. Headache throughout day (team aware)- PRN Tylenol and oxy given. SR, SBP goal maintained with PRN hydralazine x 1. Afebrile. RA. Continent of urine. No BM this shift. Soft, bite size diet, 2L fluid restriction. Ambulated to [facility] and up in chair for several hours.     Plan: Transfer to  when bed available. Notify team with changes.

## 2023-09-04 NOTE — CONSULTS
"          Initial Psychiatric Consult   Consult date: September 4, 2023         Reason for Consult, requesting source:    Anxiety, conversion?   Requesting source: Edvin Price    Labs and imaging reviewed. Discussed with team.    Total time spent in chart review, patient interview and coordination of care; 70 min          HPI:   From 9/2 neuro note;  \"Jamilah Jiménez is a 47-year-old female with HFrEF, Class III, Stage C-D, DM2, HTN, PE admitted following RHC with CI 1.61, who has been admitted since 8/31 for further evaluation and consideration for advanced heart failure therapies. She developed acute-onset expressive aphasia and right-sided weakness/numbness on 9/2, with LKW at 1040. Initial NIHSS of 7 for a right homonomous hemianopia, RUE and RLE drift, sensory loss over the right hemibody, mild expressive aphasia, and mild dysarthria. CTH was negative for acute bleeding, but did show an area of hypodensity in the left parietal lobe consistent with old stroke. CTA was negative for LVO. Given a clinical syndrome consistent with acute ischemic stroke localizing to the left frontal and parietal lobes, and an absence of contraindications to thrombolytic therapy, tenecteplase (TNK) was administered at 1142 on 9/2. Patient was transferred to the neurocritical care unit for further management. Stroke mechanism is undetermined at this time, but patient does have multiple risk factors for cardioembolic processes given her severely reduced EF.\"    With me she did not have any evidence of an expressive aphasia; her speech was fluent, she did not have any word-finding difficulties or delay in answering questions.  She mentions that she has had about a 3-year history of anxiety manifested by excessive rumination to the point where it is very difficult for her to fall asleep.  She has a lot of worry but no panic attacks, or mood cycling.  She does not have any depression symptoms.   She was started on sertraline " about a year ago but does not find it to be particularly helpful.         Past Psychiatric History:   Has only been on sertraline         Substance Use and History:   Non smoker, no excessive alcohol use, no drugs.           Past Medical History:   PAST MEDICAL HISTORY:   Past Medical History:   Diagnosis Date    Anxiety     Diabetes (H)     TYPE II    Hypertension        PAST SURGICAL HISTORY:   Past Surgical History:   Procedure Laterality Date    APPENDECTOMY      INCIDENTAL WITH C SECTION    CV CARDIAC CATHERIZATION      CV RIGHT HEART CATH MEASUREMENTS RECORDED N/A 8/31/2023    Procedure: Heart Cath Right Heart Cath;  Surgeon: Alexander Stevens MD;  Location:  HEART CARDIAC CATH LAB    EP ICD      EYE SURGERY Left     DETACHED RETINA    GYN SURGERY      TUBAL LIGATION    ORTHOPEDIC SURGERY      SHOULDER SURGERY    ORTHOPEDIC SURGERY      KNEE ARTHROSCOPY             Family History:   FAMILY HISTORY: Anxiety is present on both sides of the family.        Social History:   She is a native of [place] and continues to reside there.  She has 4 children and she says 2 of the older children in their 20s have been acting out a bit adding to her anxiety.         Physical ROS:   The 10 point Review of Systems is negative other than noted in the HPI or here.           Medications:      atorvastatin  80 mg Oral or Feeding Tube At Bedtime    [Held by provider] dapagliflozin  10 mg Oral or Feeding Tube QAM    heparin ANTICOAGULANT  5,000 Units Subcutaneous Q8H    hydrALAZINE  25 mg Oral or Feeding Tube TID    [START ON 9/5/2023] insulin aspart   Subcutaneous Daily with breakfast    insulin aspart   Subcutaneous Daily with lunch    insulin aspart  1-10 Units Subcutaneous TID AC    insulin aspart  1-7 Units Subcutaneous At Bedtime    insulin detemir  40 Units Subcutaneous At Bedtime    polyethylene glycol  17 g Oral or Feeding Tube Daily    [Held by provider] potassium chloride  60 mEq Oral or Feeding Tube TID     [Held by provider] sacubitril-valsartan  1 tablet Oral BID    senna-docusate  1-2 tablet Oral or Feeding Tube BID    sertraline  100 mg Oral or Feeding Tube Daily    sodium chloride (PF)  3 mL Intracatheter Q8H    [Held by provider] spironolactone  50 mg Oral or Feeding Tube QAM    [Held by provider] torsemide  100 mg Oral or Feeding Tube BID              Allergies:     Allergies   Allergen Reactions    Aspirin Hives     Told nursing on 6/1/22 she breaks out in hives if she takes aspirin.    Ibuprofen Hives    Lisinopril Cough    Oxycodone-Acetaminophen Nausea and Vomiting          Labs:     Recent Results (from the past 48 hour(s))   Carbapenemase Resistant Organism, PCR    Collection Time: 09/02/23  2:17 PM    Specimen: Rectum; Swab   Result Value Ref Range    IMP target DNA Not Detected Not Detected    VIM target DNA Not Detected Not Detected    NDM target DNA Not Detected Not Detected    KPC target DNA Not Detected Not Detected    OXA-48 target DNA Not Detected Not Detected   Glucose by meter    Collection Time: 09/02/23  4:42 PM   Result Value Ref Range    GLUCOSE BY METER POCT 231 (H) 70 - 99 mg/dL   Quantiferon TB Gold Plus Yellow Tube    Collection Time: 09/02/23  8:12 PM    Specimen: Arm, Right; Blood   Result Value Ref Range    Quantiferon TB2 Tube 0.05    Potassium    Collection Time: 09/02/23  8:12 PM   Result Value Ref Range    Potassium 4.0 3.4 - 5.3 mmol/L   Extra Purple Top Tube    Collection Time: 09/02/23  9:13 PM   Result Value Ref Range    Hold Specimen JIC    Glucose by meter    Collection Time: 09/02/23 10:31 PM   Result Value Ref Range    GLUCOSE BY METER POCT 187 (H) 70 - 99 mg/dL   Glucose by meter    Collection Time: 09/03/23  4:08 AM   Result Value Ref Range    GLUCOSE BY METER POCT 192 (H) 70 - 99 mg/dL   CBC with platelets    Collection Time: 09/03/23  5:44 AM   Result Value Ref Range    WBC Count 8.5 4.0 - 11.0 10e3/uL    RBC Count 5.62 (H) 3.80 - 5.20 10e6/uL    Hemoglobin 15.6 11.7 -  15.7 g/dL    Hematocrit 46.5 35.0 - 47.0 %    MCV 83 78 - 100 fL    MCH 27.8 26.5 - 33.0 pg    MCHC 33.5 31.5 - 36.5 g/dL    RDW 14.2 10.0 - 15.0 %    Platelet Count 219 150 - 450 10e3/uL   Basic metabolic panel    Collection Time: 09/03/23  5:44 AM   Result Value Ref Range    Sodium 135 (L) 136 - 145 mmol/L    Potassium 3.9 3.4 - 5.3 mmol/L    Chloride 97 (L) 98 - 107 mmol/L    Carbon Dioxide (CO2) 19 (L) 22 - 29 mmol/L    Anion Gap 19 (H) 7 - 15 mmol/L    Urea Nitrogen 45.2 (H) 6.0 - 20.0 mg/dL    Creatinine 2.00 (H) 0.51 - 0.95 mg/dL    Calcium 9.4 8.6 - 10.0 mg/dL    Glucose 249 (H) 70 - 99 mg/dL    GFR Estimate 31 (L) >60 mL/min/1.73m2   INR    Collection Time: 09/03/23  5:44 AM   Result Value Ref Range    INR 1.15 0.85 - 1.15   Partial thromboplastin time    Collection Time: 09/03/23  5:44 AM   Result Value Ref Range    aPTT 30 22 - 38 Seconds   ABO and Rh    Collection Time: 09/03/23  5:44 AM   Result Value Ref Range    ABO/RH(D) O POS     SPECIMEN EXPIRATION DATE [id number]    Glucose by meter    Collection Time: 09/03/23  8:12 AM   Result Value Ref Range    GLUCOSE BY METER POCT 199 (H) 70 - 99 mg/dL   Echo Limited    Collection Time: 09/03/23 10:22 AM   Result Value Ref Range    LVEF  15-20% (severely reduced)    Glucose by meter    Collection Time: 09/03/23 12:07 PM   Result Value Ref Range    GLUCOSE BY METER POCT 335 (H) 70 - 99 mg/dL   EKG 12-lead, complete    Collection Time: 09/03/23  5:10 PM   Result Value Ref Range    Systolic Blood Pressure  mmHg    Diastolic Blood Pressure  mmHg    Ventricular Rate 80 BPM    Atrial Rate 80 BPM    DC Interval 178 ms    QRS Duration 112 ms     ms    QTc 489 ms    P Axis 41 degrees    R AXIS -38 degrees    T Axis 90 degrees    Interpretation ECG       Sinus rhythm  Possible Left atrial enlargement  Left axis deviation  Left ventricular hypertrophy ( R in aVL , [place] product )  Prolonged QT  Abnormal ECG  When compared with ECG of 31-AUG-2023 21:01,  QRS  duration has increased  ST no longer depressed in Inferior leads     Glucose by meter    Collection Time: 09/03/23  5:20 PM   Result Value Ref Range    GLUCOSE BY METER POCT 266 (H) 70 - 99 mg/dL   Basic metabolic panel    Collection Time: 09/03/23  5:25 PM   Result Value Ref Range    Sodium 134 (L) 136 - 145 mmol/L    Potassium 3.2 (L) 3.4 - 5.3 mmol/L    Chloride 93 (L) 98 - 107 mmol/L    Carbon Dioxide (CO2) 21 (L) 22 - 29 mmol/L    Anion Gap 20 (H) 7 - 15 mmol/L    Urea Nitrogen 51.2 (H) 6.0 - 20.0 mg/dL    Creatinine 2.24 (H) 0.51 - 0.95 mg/dL    Calcium 9.6 8.6 - 10.0 mg/dL    Glucose 278 (H) 70 - 99 mg/dL    GFR Estimate 27 (L) >60 mL/min/1.73m2   Magnesium    Collection Time: 09/03/23  5:25 PM   Result Value Ref Range    Magnesium 2.6 (H) 1.7 - 2.3 mg/dL   Glucose by meter    Collection Time: 09/03/23 10:06 PM   Result Value Ref Range    GLUCOSE BY METER POCT 167 (H) 70 - 99 mg/dL   Magnesium    Collection Time: 09/04/23  5:56 AM   Result Value Ref Range    Magnesium 2.9 (H) 1.7 - 2.3 mg/dL   Basic metabolic panel    Collection Time: 09/04/23  5:56 AM   Result Value Ref Range    Sodium 137 136 - 145 mmol/L    Potassium 3.7 3.4 - 5.3 mmol/L    Chloride 99 98 - 107 mmol/L    Carbon Dioxide (CO2) 23 22 - 29 mmol/L    Anion Gap 15 7 - 15 mmol/L    Urea Nitrogen 49.9 (H) 6.0 - 20.0 mg/dL    Creatinine 1.91 (H) 0.51 - 0.95 mg/dL    Calcium 9.3 8.6 - 10.0 mg/dL    Glucose 206 (H) 70 - 99 mg/dL    GFR Estimate 32 (L) >60 mL/min/1.73m2   CBC with platelets    Collection Time: 09/04/23  5:56 AM   Result Value Ref Range    WBC Count 6.4 4.0 - 11.0 10e3/uL    RBC Count 5.21 (H) 3.80 - 5.20 10e6/uL    Hemoglobin 14.3 11.7 - 15.7 g/dL    Hematocrit 43.7 35.0 - 47.0 %    MCV 84 78 - 100 fL    MCH 27.4 26.5 - 33.0 pg    MCHC 32.7 31.5 - 36.5 g/dL    RDW 14.0 10.0 - 15.0 %    Platelet Count 212 150 - 450 10e3/uL   Glucose by meter    Collection Time: 09/04/23  8:13 AM   Result Value Ref Range    GLUCOSE BY METER POCT 149 (H)  "70 - 99 mg/dL   Glucose by meter    Collection Time: 09/04/23  1:06 PM   Result Value Ref Range    GLUCOSE BY METER POCT 278 (H) 70 - 99 mg/dL          Physical and Psychiatric Examination:     /78 (BP Location: Left arm, Cuff Size: Adult Regular)   Pulse 73   Temp 97.9  F (36.6  C) (Oral)   Resp 18   Ht 1.702 m (5' 7.01\")   Wt 78.2 kg (172 lb 6.4 oz)   SpO2 97%   BMI 27.00 kg/m    Weight is 172 lbs 6.4 oz  Body mass index is 27 kg/m .    Physical Exam:  I have reviewed the physical exam as documented by by the medical team and agree with findings and assessment and have no additional findings to add at this time.         MSE:   Appearance: awake, alert and adequately groomed  Attitude:  cooperative  Eye Contact:  good  Mood:  \"OK\"  Affect:  slightly restricted  Speech:  clear, coherent  Psychomotor Behavior:  no evidence of tardive dyskinesia, dystonia, or tics  Muscle strength and tone: intact, no facial asymmetry or difference in arm strength  Thought Process:  logical and linear  Associations:  no loose associations  Thought Content:  no evidence of suicidal ideation or homicidal ideation  Insight:  good  Judgement:  intact  Oriented to:  time, person, and place  Attention Span and Concentration:  intact  Recent and Remote Memory:  intact      QTc: 489 ms 9/3, 445 ms 9/2          DSM-5 Diagnosis:   300.02 (F41.1) Generalized Anxiety Disorder  Possible mild conversion disorder           Assessment:   She may have had some mild conversion disorder but seems to have resolved at this point. She is having incomplete relief of anxiety symptoms with current dose of sertraline.  Her prolonged QTc is of some concern and limits of the meds we can use, will need to follow QTc.           Summary of Recommendations:   I would increase the sertraline dose to 150 mg/day until she eventually might need to 100 mg.  It is probably best to limit the hydroxyzine to 25 mg per dose due to the QTc effect and " "anticholinergic burden  Try trazodone 50 mg at bedtime; at this dose that should not have too much QTc effect  The book \"Mind over Mood\" was suggested so as to learn CBT   Input from psychology may be helpful.   .Page me or re-consult psychiatry as needed (psychiatry is signing off).     Mihir Nino M.D.   Consult liaison psychiatry   [facility]   Contact information available via [facility] Paging/Directory.  If I am not available, then [facility] intake ([phone]) should know who   Is on call       \"This dictation was performed with voice recognition software and may contain errors,  omissions and inadvertent word substitution.\"           "

## 2023-09-05 ENCOUNTER — ANCILLARY PROCEDURE (OUTPATIENT)
Dept: CARDIOLOGY | Facility: CLINIC | Age: 48
End: 2023-09-05
Attending: INTERNAL MEDICINE
Payer: MEDICAID

## 2023-09-05 ENCOUNTER — APPOINTMENT (OUTPATIENT)
Dept: MRI IMAGING | Facility: CLINIC | Age: 48
End: 2023-09-05
Payer: MEDICAID

## 2023-09-05 DIAGNOSIS — I42.8 NONISCHEMIC CARDIOMYOPATHY (H): Primary | ICD-10-CM

## 2023-09-05 DIAGNOSIS — I42.8 NONISCHEMIC CARDIOMYOPATHY (H): ICD-10-CM

## 2023-09-05 LAB
AMPHETAMINES SERPL QL SCN: NEGATIVE NG/ML
ANION GAP SERPL CALCULATED.3IONS-SCNC: 13 MMOL/L (ref 7–15)
ANNOTATION COMMENT IMP: NORMAL
ATRIAL RATE - MUSE: 80 BPM
BARBITURATES SERPL QL SCN: NEGATIVE NG/ML
BENZODIAZ SERPL QL SCN: NEGATIVE NG/ML
BUN SERPL-MCNC: 38.1 MG/DL (ref 6–20)
BUPRENORPHINE SERPL-MCNC: NEGATIVE NG/ML
CALCIUM SERPL-MCNC: 9.2 MG/DL (ref 8.6–10)
CANNABINOIDS SERPL QL SCN: NEGATIVE NG/ML
CARDIOPULMONARY BLOOD PRESSURE REST: NORMAL MMHG
CARDIOPULMONARY BREATHING RESERVE REST: 77.8
CARDIOPULMONARY BREATHING RESERVE V02MAX: 43
CARDIOPULMONARY CO2 OUTPUT REST: 387 ML/MIN
CARDIOPULMONARY CO2 OUTPUT VO2MAX: 1013 ML/MIN
CARDIOPULMONARY FEV 1.0 (L) ACTUAL: 3.09
CARDIOPULMONARY FEV 1.0 (L) PRECENT: 96 %
CARDIOPULMONARY FEV 1.0 (L) PREDICTED: 3.21
CARDIOPULMONARY FEV 1.0 FVC (%) ACTUAL: 84
CARDIOPULMONARY FEV 1.0 FVC (%) PERCENT: 103 %
CARDIOPULMONARY FEV 1.0 FVC (%) PREDICTED: 81.3
CARDIOPULMONARY FUNCTIONAL CAPACITY MAX ML/KG/MIN: 12.7 ML/KG/MIN
CARDIOPULMONARY FUNCTIONAL CAPACITY PERCENT: 43 %
CARDIOPULMONARY FUNCTIONAL CAPACITY PREDICTED: 29.8 ML/KG/MIN
CARDIOPULMONARY FVC (L) ACTUAL: 3.68
CARDIOPULMONARY FVC (L) PERCENT: 92 %
CARDIOPULMONARY FVC (L) PREDICTED: 4
CARDIOPULMONARY HEART RATE REST: 92 BPM
CARDIOPULMONARY MET'S REST: 1.4
CARDIOPULMONARY MINUTE VENTILATION REST: 24 L/MIN
CARDIOPULMONARY MINUTE VENTILATION VO2MAX: 61.5 L/MIN
CARDIOPULMONARY MYOCARDIAC O2 DEMAND MAX: NORMAL
CARDIOPULMONARY OXYGEN CONSUMPTION REST: 4.7 ML/KG/MIN
CARDIOPULMONARY OXYGEN CONSUMPTION VO2MAX: 12.7 ML/KG/MIN
CARDIOPULMONARY OXYGEN PULSE REST: 4 ML/BEAT
CARDIOPULMONARY OXYGEN PULSE VO2MAX: 5.6 ML/BEAT
CARDIOPULMONARY OXYGEN SATURATION- OXIMETRY REST: 100 %
CARDIOPULMONARY OXYGEN SATURATION- OXIMETRY VO2MAX: 100 %
CARDIOPULMONARY PET C02 REST: 18
CARDIOPULMONARY PET C02 VO2MAX: 19
CARDIOPULMONARY PET02 REST: 130
CARDIOPULMONARY PET02 V02 MAX: 127
CARDIOPULMONARY RER: 0.99
CARDIOPULMONARY RESPIRALORY EXCHANGE RATIO VO2MAX: 0.99
CARDIOPULMONARY RESPIRALORY EXCHANGE RATIO: 1.07
CARDIOPULMONARY RESPIRATORY RATE REST: 22 BR/MIN
CARDIOPULMONARY RESPIRATORY RATE VO2MAX: 23 BR/MIN
CARDIOPULMONARY STRESS BASE 1 BP MMHG: NORMAL MMHG
CARDIOPULMONARY STRESS BASE 1 BPA: 98 BPM
CARDIOPULMONARY STRESS BASE 1 SPO2: 100 % SPO2
CARDIOPULMONARY STRESS BASE 1 TIME: 1 MINS
CARDIOPULMONARY STRESS BASE 2 BP MMHG: NORMAL MMHG
CARDIOPULMONARY STRESS BASE 2 BPA: 91 BPM
CARDIOPULMONARY STRESS BASE 2 SPO2: 100 % SPO2
CARDIOPULMONARY STRESS BASE 2 TIME: 3 MINS
CARDIOPULMONARY STRESS BASE 3 BP MMHG: NORMAL MMHG
CARDIOPULMONARY STRESS BASE 3 BPA: 88 BPM
CARDIOPULMONARY STRESS BASE 3 SPO2: 100 % SPO2
CARDIOPULMONARY STRESS BASE 3 TIME: 7 MINS
CARDIOPULMONARY STRESS PHASE 1 BP MMHG: NORMAL MMHG
CARDIOPULMONARY STRESS PHASE 1 BPM: 101 BPM
CARDIOPULMONARY STRESS PHASE 1 SPO2: 100 % SPO2
CARDIOPULMONARY STRESS PHASE 1 TIME: 2 MINS
CARDIOPULMONARY STRESS PHASE 2 BP MMHG: NORMAL MMHG
CARDIOPULMONARY STRESS PHASE 2 BPM: 101 BPM
CARDIOPULMONARY STRESS PHASE 2 SPO2: 100 % SPO2
CARDIOPULMONARY STRESS PHASE 2 TIME: 4 MINS
CARDIOPULMONARY STRESS PHASE 3 BP MMHG: NORMAL MMHG
CARDIOPULMONARY STRESS PHASE 3 BPM: 104 BPM
CARDIOPULMONARY STRESS PHASE 3 SPO2: 100 % SPO2
CARDIOPULMONARY STRESS PHASE 3 TIME SEC: 12 SEC
CARDIOPULMONARY STRESS PHASE 3 TIME: 5 MINS
CARDIOPULMONARY SVC (L) ACTUAL: 3.6
CARDIOPULMONARY SVC (L) PERCENT: 90 %
CARDIOPULMONARY SVC (L) PREDICTED: 4
CARDIOPULMONARY TIDAL VOLUME REST: 1089 ML
CARDIOPULMONARY TIDAL VOLUME VO2MAX: 2651 ML
CARDIOPULMONARY VE/VCO2 SLOPE: 59.11
CARDIOPULMONARY VENTILATORY EQUIVALENT 02 REST: 66
CARDIOPULMONARY VENTILATORY EQUIVALENT 02 V02: 60
CARDIOPULMONARY VENTILATORY EQUIVALENT C02 REST: 62
CARDIOPULMONARY VENTILATORY EQUIVALENT C02 SLOPE VO2MAX: 59.11
CARDIOPULMONARY VENTILATORY EQUIVALENT C02 VO2MAX: 61
CELL TYPE AUTO: NORMAL
CHANNELSHIFTAUTOB1: 32
CHANNELSHIFTAUTOT1: 28
CHLORIDE SERPL-SCNC: 99 MMOL/L (ref 98–107)
CMV IGG SERPL IA-ACNC: >10 U/ML
CMV IGG SERPL IA-ACNC: ABNORMAL
COCAINE SERPL QL SCN: NEGATIVE NG/ML
COTININE SERPL-MCNC: <5 NG/ML
CREAT SERPL-MCNC: 1.46 MG/DL (ref 0.51–0.95)
CROSSMATCHDATEAUTO: NORMAL
CV STRESS MAX HR HE: 106
DEPRECATED HCO3 PLAS-SCNC: 23 MMOL/L (ref 22–29)
DIASTOLIC BLOOD PRESSURE - MUSE: NORMAL MMHG
DONOR AUTO: NORMAL
DONORCELLDATE AUTO: NORMAL
DRVVT SCREEN RATIO: 1.07
EBV VCA IGG SER IA-ACNC: >750 U/ML
EBV VCA IGG SER IA-ACNC: POSITIVE
ERYTHROCYTE [DISTWIDTH] IN BLOOD BY AUTOMATED COUNT: 13.7 % (ref 10–15)
GFR SERPL CREATININE-BSD FRML MDRD: 45 ML/MIN/1.73M2
GLUCOSE BLDC GLUCOMTR-MCNC: 147 MG/DL (ref 70–99)
GLUCOSE BLDC GLUCOMTR-MCNC: 198 MG/DL (ref 70–99)
GLUCOSE BLDC GLUCOMTR-MCNC: 199 MG/DL (ref 70–99)
GLUCOSE BLDC GLUCOMTR-MCNC: 210 MG/DL (ref 70–99)
GLUCOSE SERPL-MCNC: 191 MG/DL (ref 70–99)
HCT VFR BLD AUTO: 41.6 % (ref 35–47)
HGB BLD-MCNC: 13.8 G/DL (ref 11.7–15.7)
HSV1 IGG SERPL QL IA: 41.9 INDEX
HSV1 IGG SERPL QL IA: ABNORMAL
HSV2 IGG SERPL QL IA: 16 INDEX
HSV2 IGG SERPL QL IA: ABNORMAL
INTERPRETATION ECG - MUSE: NORMAL
LA PPP-IMP: NEGATIVE
LABORATORY REPORT: NORMAL
LUPUS INTERPRETATION: NORMAL
MAGNESIUM SERPL-MCNC: 2.8 MG/DL (ref 1.7–2.3)
MCH RBC QN AUTO: 28.3 PG (ref 26.5–33)
MCHC RBC AUTO-ENTMCNC: 33.2 G/DL (ref 31.5–36.5)
MCV RBC AUTO: 85 FL (ref 78–100)
METHADONE SERPL QL SCN: NEGATIVE NG/ML
METHAMPHET SERPL QL: NEGATIVE NG/ML
NICOTINE SERPL-MCNC: <5 NG/ML
OPIATES SERPL QL SCN: NEGATIVE NG/ML
OXYCODONE SERPL QL: NEGATIVE NG/ML
P AXIS - MUSE: 41 DEGREES
PCP SERPL QL SCN: NEGATIVE NG/ML
PHOSPHATE SERPL-MCNC: 3.7 MG/DL (ref 2.5–4.5)
PLATELET # BLD AUTO: 195 10E3/UL (ref 150–450)
PLPETH BLD-MCNC: <10 NG/ML
POPETH BLD-MCNC: <10 NG/ML
POS CUT OFF AUTO B: >69
POS CUT OFF AUTO T: >53
POTASSIUM SERPL-SCNC: 3.7 MMOL/L (ref 3.4–5.3)
PR INTERVAL - MUSE: 178 MS
PREALB SERPL IA-MCNC: 26 MG/DL (ref 15–45)
PREDICTED VO2MAX: 29.8
PTT RATIO: 1.11
QRS DURATION - MUSE: 112 MS
QT - MUSE: 424 MS
QTC - MUSE: 489 MS
R AXIS - MUSE: -38 DEGREES
RATED PERCEIVED EXERTION: 14
RBC # BLD AUTO: 4.87 10E6/UL (ref 3.8–5.2)
RESULT AUTO B1: NORMAL
RESULT AUTO T1: NORMAL
SERUM DATE AUTO B1: NORMAL
SERUM DATE AUTO T1: NORMAL
SODIUM SERPL-SCNC: 135 MMOL/L (ref 136–145)
STRESS ANGINA INDEX: 2
STRESS ECHO BASELINE BP: NORMAL MMHG
STRESS ECHO BASELINE HR: 78 BPM
STRESS ECHO CALCULATED PERCENT HR: 61 %
STRESS ECHO LAST STRESS BP: NORMAL MMHG
STRESS ECHO POST ESTIMATED WORKLOAD: 3.6 METS
STRESS ECHO POST EXERCISE DUR MIN: 5 MIN
STRESS ECHO POST EXERCISE DUR SEC: 12 SEC
STRESS ECHO TARGET HR: 175
SYSTOLIC BLOOD PRESSURE - MUSE: NORMAL MMHG
T AXIS - MUSE: 90 DEGREES
T GONDII IGG SER QL: 5.7 IU/ML (ref 0–7.1)
TESTMETHODAUTO: NORMAL
THROMBIN TIME: 17.5 SECONDS (ref 13–19)
TREATMENT AUTO B1: NORMAL
TREATMENT AUTO T1: NORMAL
VENTRICULAR RATE- MUSE: 80 BPM
VZV IGG SER QL IA: 576.6 INDEX
VZV IGG SER QL IA: POSITIVE
WBC # BLD AUTO: 5.2 10E3/UL (ref 4–11)

## 2023-09-05 PROCEDURE — 250N000013 HC RX MED GY IP 250 OP 250 PS 637: Performed by: NURSE PRACTITIONER

## 2023-09-05 PROCEDURE — 99232 SBSQ HOSP IP/OBS MODERATE 35: CPT | Performed by: NURSE PRACTITIONER

## 2023-09-05 PROCEDURE — 250N000013 HC RX MED GY IP 250 OP 250 PS 637

## 2023-09-05 PROCEDURE — 83735 ASSAY OF MAGNESIUM: CPT | Performed by: THORACIC SURGERY (CARDIOTHORACIC VASCULAR SURGERY)

## 2023-09-05 PROCEDURE — 93287 PERI-PX DEVICE EVAL & PRGR: CPT

## 2023-09-05 PROCEDURE — 70551 MRI BRAIN STEM W/O DYE: CPT | Mod: 26 | Performed by: RADIOLOGY

## 2023-09-05 PROCEDURE — 250N000013 HC RX MED GY IP 250 OP 250 PS 637: Performed by: THORACIC SURGERY (CARDIOTHORACIC VASCULAR SURGERY)

## 2023-09-05 PROCEDURE — 99233 SBSQ HOSP IP/OBS HIGH 50: CPT | Mod: 25 | Performed by: INTERNAL MEDICINE

## 2023-09-05 PROCEDURE — 200N000002 HC R&B ICU UMMC

## 2023-09-05 PROCEDURE — 70551 MRI BRAIN STEM W/O DYE: CPT

## 2023-09-05 PROCEDURE — 85027 COMPLETE CBC AUTOMATED: CPT

## 2023-09-05 PROCEDURE — 250N000013 HC RX MED GY IP 250 OP 250 PS 637: Performed by: INTERNAL MEDICINE

## 2023-09-05 PROCEDURE — 84100 ASSAY OF PHOSPHORUS: CPT | Performed by: THORACIC SURGERY (CARDIOTHORACIC VASCULAR SURGERY)

## 2023-09-05 PROCEDURE — 93287 PERI-PX DEVICE EVAL & PRGR: CPT | Mod: 26 | Performed by: INTERNAL MEDICINE

## 2023-09-05 PROCEDURE — 92526 ORAL FUNCTION THERAPY: CPT | Mod: GN

## 2023-09-05 PROCEDURE — 80048 BASIC METABOLIC PNL TOTAL CA: CPT

## 2023-09-05 PROCEDURE — 36415 COLL VENOUS BLD VENIPUNCTURE: CPT

## 2023-09-05 PROCEDURE — 250N000011 HC RX IP 250 OP 636: Performed by: NURSE PRACTITIONER

## 2023-09-05 RX ORDER — POTASSIUM CHLORIDE 750 MG/1
20 TABLET, EXTENDED RELEASE ORAL ONCE
Status: COMPLETED | OUTPATIENT
Start: 2023-09-05 | End: 2023-09-05

## 2023-09-05 RX ADMIN — ACETAMINOPHEN 650 MG: 325 TABLET, FILM COATED ORAL at 19:32

## 2023-09-05 RX ADMIN — OXYCODONE HYDROCHLORIDE 5 MG: 5 TABLET ORAL at 02:20

## 2023-09-05 RX ADMIN — OXYCODONE HYDROCHLORIDE 5 MG: 5 TABLET ORAL at 14:52

## 2023-09-05 RX ADMIN — ACETAMINOPHEN 650 MG: 325 TABLET, FILM COATED ORAL at 00:08

## 2023-09-05 RX ADMIN — HYDRALAZINE HYDROCHLORIDE 50 MG: 50 TABLET, FILM COATED ORAL at 19:32

## 2023-09-05 RX ADMIN — HYDROXYZINE HYDROCHLORIDE 25 MG: 25 TABLET, FILM COATED ORAL at 19:39

## 2023-09-05 RX ADMIN — ACETAMINOPHEN 650 MG: 325 TABLET, FILM COATED ORAL at 11:02

## 2023-09-05 RX ADMIN — HEPARIN SODIUM 5000 UNITS: 5000 INJECTION, SOLUTION INTRAVENOUS; SUBCUTANEOUS at 09:14

## 2023-09-05 RX ADMIN — SERTRALINE HYDROCHLORIDE 150 MG: 100 TABLET ORAL at 07:24

## 2023-09-05 RX ADMIN — POTASSIUM CHLORIDE 20 MEQ: 750 TABLET, EXTENDED RELEASE ORAL at 09:14

## 2023-09-05 RX ADMIN — SENNOSIDES AND DOCUSATE SODIUM 1 TABLET: 50; 8.6 TABLET ORAL at 19:32

## 2023-09-05 RX ADMIN — TRAZODONE HYDROCHLORIDE 50 MG: 50 TABLET ORAL at 22:18

## 2023-09-05 RX ADMIN — INSULIN DETEMIR 40 UNITS: 100 INJECTION, SOLUTION SUBCUTANEOUS at 22:19

## 2023-09-05 RX ADMIN — HYDRALAZINE HYDROCHLORIDE 50 MG: 50 TABLET, FILM COATED ORAL at 13:19

## 2023-09-05 RX ADMIN — HEPARIN SODIUM 5000 UNITS: 5000 INJECTION, SOLUTION INTRAVENOUS; SUBCUTANEOUS at 02:09

## 2023-09-05 RX ADMIN — HYDRALAZINE HYDROCHLORIDE 50 MG: 50 TABLET, FILM COATED ORAL at 08:05

## 2023-09-05 RX ADMIN — ATORVASTATIN CALCIUM 80 MG: 80 TABLET, FILM COATED ORAL at 19:32

## 2023-09-05 RX ADMIN — HEPARIN SODIUM 5000 UNITS: 5000 INJECTION, SOLUTION INTRAVENOUS; SUBCUTANEOUS at 17:01

## 2023-09-05 ASSESSMENT — ACTIVITIES OF DAILY LIVING (ADL)
ADLS_ACUITY_SCORE: 20

## 2023-09-05 ASSESSMENT — VISUAL ACUITY: OU: BASELINE

## 2023-09-05 NOTE — PROGRESS NOTES
[facility]   Cardiology II Service / Advanced Heart Failure  Daily Progress Note  Date of Service: 9/1/2023      Patient: Jamilah Jiménez  MRN: [id number]  Admission Date: 8/31/2023  Hospital Day # 5    Assessment and Plan:  Jamilah Jiménez is a 47-year-old female with HFrEF, Class III, Stage C-D, DM2, HTN, PE admitted following RHC with CI 1.61. Admitted for further evaluation and consideration for advanced heart failure therapies.     Changes today:  - MRI scan today, will follow with reading   - Continue to hold diuretics, Spironolactone, Entresto.  - Continue LVAD/Transplant evaluation as able      #Nausea  #Aphasia  #Right-sided numbness and weakness   On 09/02, she developed acute-onset expressive aphasia and right-sided weakness/numbness, CTH was negative for acute bleeding, but did show an area of hypodensity in the left parietal lobe consistent with old stroke. CTA was negative for LVO. Given a clinical syndrome consistent with acute ischemic stroke localizing to the left frontal and parietal lobes, and an absence of contraindications to thrombolytic therapy, tenecteplase (TNK) was administered. She was transferred to the neurocritical care unit for further management. Similar episode 09/03 where she was unable to articulate her name or date of birth with slow and slurred speech and word finding difficulty. Throughout the episode, was noted to have purposeful, strong, equal strength bilaterally. Repeat Head CT without evidence of intracranial hemorrhage, stable appearance of patchy hypoattenuation of the left parietal periventricular matter.  Seen by psych 09/04/23 noted to have some component of conversion disorder.   - seen by neurology and psychiatry  - MRI scan 09/05/23 will follow up   - Increased sertraline to 150 mg, trazodone 50 mg at bedtime           #HFrEF (LVEF 18%), non-ischemic cardiomyopathy (genetic) s/p ICD  #HTN   #HLD   ACC/AHA Stage D, NYHA Symptom Class  IV  Patient with progressively worsening dyspnea, edema, overall functional status over the last ~ 2 years. 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 Has been on diuretics, GDMT, has ICD in place.     Primary Cardiologist: Dr. Saucedo Last seen 12/06/2022   Ischemic Eval: Coronary angiogram 12/24/2020 with no significant coronary artery disease, elevated LVEDP 24 mmHg   RHC 08/31/2023: RA 9; PA 41/22/30; Amina CO/CI 3.04/1.61   CMR 08/31/2023: Severely dilated LV with global systolic function severely reduced, LVEF 18%, severe diffuse hypokinesis, RV with normal cavity size and normal global systolic function, RVEF 53%  Cardiopulmonary Stress Test 09/01: Peak VO2 12.70 ml/kg/min; VE/VCO2 59.11; RER 0.99      Volume: Euvolemic   Diuretic: Torsemide 100 mg BID (held 09/03/23, ION)  ACE-I/ARB/ARNi: Entresto  mg BID (held on 09/03/23, ION)   BB: Carvedilol 3.125 mg BID (will  hold in setting of low CI)   Aldosterone antagonist: Spironolactone 25 mg (held 09/03/23, ION)  SGLT2i: dapagliflozin 10 mg   Hydral/Nitrates: hydralazine 25 TID   SCD prophylaxis: ICD 11/2022   Statin: Atorvastatin 80 mg        DATE MAP CVP PAP PCWP Amina CO Amina CI SVR MVo2 Therapies Weight    08/31/2023   9 41/22/30 19 3.04 1.61       172 lbs    10/2022   12 76/35/50 38 3.25 1.72       169 lbs      LVAD/Transplant Evaluation Checklist  [] Labs (CBC, CMP, PT/INR, cystatin C, prealbumin, UA + micro)  [] Infectious (Hep A/B/C, HIV, treponema, HSV 1/2 IgG, CMV IgG, Toxo IgG, EBV IgG, varicella IgG, Quant gold, COVID vaccine/PCR)  [] Utox/nicotine and cotinine/PeTH   [x] Immunocompatibility (last transfusion, ABO, HLA tissue typing, PRA)  [x] ECG, Echo - 08/31/2023: Severely dilated LV with global systolic function severely reduced, LVEF 18%, severe diffuse hypokinesis, RV with normal cavity size and normal global systolic function, RVEF 53%  [x] CPX 9/1/2023- Peak VO2 12.70 ml/kg/min; VE/VCO2 59.11; RER 0.99    [] 6MWT   [x] [facility] - 8/31/2023: RA 9; PA 41/22/30; Amina CO/CI 3.04/1.61   [x] CVTS consult  [] Social work consult  [] Palliative care consult  [] Neuropsych consult  [x] Nutrition consult  [x] CT Dental   [] Dental consult  [x] Abd US + doppler  [x] Extremity US and ABIs  [x] Carotid US (if DM or ICM or >51yo)  [] PFTs  [] Dexa  [x] CT head non-contrast  [x] CT CAP non-contrast -  Sub-6 mm pulmonary nodules. Optional follow-up CT of the chest in  one year, if patient is high risk per Fleischner criteria  [] colonoscopy (>49 yo)  [x] HCG  [] mammogram (>39 yo)  [] Pap test     #Type 2 DM   Most recent Hgb A1c 7.8 on 08/10/2023  - Detemir 30 U at bedtime  - [facility]      Rosa Maria Dunbar MD  PGY-2   Internal Medicine Resident     ================================================================    Interval History/ROS:   No acute event overnight. Per nursing had some concerns about variation in pupil size not noted on exam today.  No additional concerns noted today.     Medications: Reviewed in EPIC.     Physical Exam:   Temp:  [97.9  F (36.6  C)-98.2  F (36.8  C)] 98.2  F (36.8  C)  Pulse:  [72-95] 86  Resp:  [12-23] 19  BP: ()/(52-96) 118/72  SpO2:  [93 %-100 %] 97 %      GEN: No acute distress   HEENT: EOMI, no icterus, moist mucous membranes   CV: RRR, normal s1/s2, no murmurs. JVP ~8cm   CHEST: Normal work of breathing. Lungs CTAB, no wheezes or crackles.   ABD: Soft, Non-tender. Fluid wave and abdomenal distention present.    EXT: Warm and well-perfused, trace edema  NEURO: Awake, alert, answering questions appropriately, motor grossly nonfocal  PSYCH: cooperative, affect appropriate    Data:  Lab Results   Component Value Date    WBC 6.4 09/04/2023    HGB 14.3 09/04/2023    HCT 43.7 09/04/2023     09/04/2023     09/04/2023    POTASSIUM 3.7 09/04/2023    CHLORIDE 99 09/04/2023    CO2 23 09/04/2023    BUN 49.9 (H) 09/04/2023    CR 1.91 (H) 09/04/2023     (H) 09/04/2023    NTBNPI 1,425 (H)  08/31/2023    AST 23 09/02/2023    ALT 13 09/02/2023    ALKPHOS 93 09/02/2023    BILITOTAL 1.0 09/02/2023    INR 1.15 09/03/2023                Lab Results   Component Value Date     09/05/2023    Lab Results   Component Value Date    CHLORIDE 99 09/05/2023    Lab Results   Component Value Date    BUN 38.1 09/05/2023      Lab Results   Component Value Date    POTASSIUM 3.7 09/05/2023    Lab Results   Component Value Date    CO2 23 09/05/2023    Lab Results   Component Value Date    CR 1.46 09/05/2023        Lab Results   Component Value Date    WBC 5.2 09/05/2023    HGB 13.8 09/05/2023    HCT 41.6 09/05/2023    MCV 85 09/05/2023     09/05/2023         Imaging:    CT Head w/o Contrast (09/03/2023)  MPRESSION:   1. No CT evidence of intracranial hemorrhage.  2. Stable appearance of patchy hypoattenuation of the left parietal  periventricular white matter.     CT Head w/o Contrast (09/02/2023)  Impression:   1. No evidence of intracranial hemorrhage.  2. Head CTA demonstrates no aneurysm or stenosis of the major  intracranial arteries.   3. Neck CTA demonstrates no stenosis of the major cervical arteries.  Mild atherosclerotic changes of bilateral carotid bifurcations.  4. Mild patchy low attenuation within the left parietal white matter;   stable from yesterday's study; this might represent old infarct or  gliotic changes. However may consider MRI to exclude acute infarct  given the limitations of C    CTA Head Neck (09/02/2023)  Impression:   1. No evidence of intracranial hemorrhage.  2. Head CTA demonstrates no aneurysm or stenosis of the major  intracranial arteries.   3. Neck CTA demonstrates no stenosis of the major cervical arteries.  Mild atherosclerotic changes of bilateral carotid bifurcations.  4. Mild patchy low attenuation within the left parietal white matter;   stable from yesterday's study; this might represent old infarct or  gliotic changes. However may consider MRI to exclude acute  infarct  given the limitations of CT.     Chest CT 9/1/2023  IMPRESSION:   1. No acute or suspicious abnormalities in the chest, abdomen, or  pelvis.  2. Sub-6 mm pulmonary nodules. Optional follow-up CT of the chest in  one year, if patient is high risk per Fleischner criteria    CT Head w/o contrast 9/1/2023  No suspicious intracranial finding. Subtle patchy  hypodensities in the left parietal subcortical white matter, likely  related to gliosis from underlying chronic small vessel disease.    CT Dental wo contrast 9/1/2023  Multifocal scattered dental caries and dental  reconstruction. Most notably there is a large cavity in the left  maxillary first premolar tooth, associated with periapical lucency and  dehiscence of the adjacent outer maxillary cortical bone, compatible  with periapical periodontitis.    CXR 9/1/2023  ICD in place.    US BI Doppler 9/1/2023  1. RIGHT:       A. Resting MARLENE is normal, 1.18.     2. LEFT:       A. Resting MARLENE is normal, 1.11.    Upper Extremity US 9/1/2023  RIGHT:  Subclavian artery, medial: 64/0 cm/s, triphasic, 10.6 mm  Subclavian artery, mid: 62/0 cm/s, triphasic, 8.7 mm  Subclavian artery, lateral: 62/0 cm/s, triphasic, 9.1 mm     Axillary artery: 56/0 cm/s, triphasic, 8.2 mm     LEFT:  Subclavian artery, medial: 76/0 cm/s, triphasic, 8.5 mm  Subclavian artery, mid: 105/0 cm/s, triphasic, 8.6 mm  Subclavian artery, lateral: 71/0 cm/s, triphasic, 7.9 mm     Axillary artery: 69/0 cm/s, triphasic, 8.1 mm                                                                      IMPRESSION: Patent bilateral subclavian and axillary arteries with  measurements as in the report.    Carotid US 9/1/2023  RIGHT ICA: Less than 50% diameter narrowing by grayscale imaging  and sonographic velocity criteria.     LEFT ICA:  Less than 50% diameter narrowing by grayscale imaging  and sonographic velocity criteria

## 2023-09-05 NOTE — PROGRESS NOTES
Neurocritical Care Progress Note    Reason for critical care admission: Acute HFrEF   Admitting Team: Cardiology    Date of Service:  09/05/2023  Date of Admission:  8/31/2023  Hospital Day: 6    Assessment/Plan  Jamilah Jiménez is a 47-year-old female with HFrEF, Class III, Stage C-D, DM2, HTN, PE admitted following RHC with CI 1.61, who has been admitted since 8/31 for further evaluation and consideration for advanced heart failure therapies. She developed acute-onset expressive aphasia and right-sided weakness/numbness on 9/2, with LKW at 1040. Initial NIHSS of 7 for a right homonomous hemianopia, RUE and RLE drift, sensory loss over the right hemibody, mild expressive aphasia, and mild dysarthria. CTH was negative for acute bleeding, but did show an area of hypodensity in the left parietal lobe consistent with old stroke. CTA was negative for LVO. Given a clinical syndrome consistent with acute ischemic stroke localizing to the left frontal and parietal lobes, and an absence of contraindications to thrombolytic therapy, tenecteplase (TNK) was administered at 1142 on 9/2. Patient was transferred to the neurocritical care unit for further management. Stroke mechanism is undetermined at this time, but patient does have multiple risk factors for cardioembolic processes given her severely reduced EF.    24 hour events: No acute events overnight. Awaiting floor bed.    MR brain obtained this afternoon which did not show evidence of acute ischemic stroke. However, given prompt administration of TNK we cannot exclude that she didn't have one altogether. Therefore, we would recommend a secondary stroke prevention such as Aspirin for 3 months, but, given her allergy to Aspirin we would recommend Plavix 75 mg instead.      Neuro  #Acute onset aphasia, transient    #Concern for acute ischemic stroke of the left cerebral hemisphere s/p TNK administration, uncertain mechanism at this time  -9/5 MR brain without acute  ischemic stroke  -Given Aspirin allergy, would recommend Plavix for secondary stroke prevention x3 months  -Follow up with primary care provider in 3 months to see if Plavix should be continued   -Continue Atorvastatin 80 mg daily   -PT/OT  -Stroke Education  -Depression Screen  -Apnea screening questions  -NCC team will sign off. Please call [phone] for any further questions or concerns.      TIME SPENT ON THIS ENCOUNTER   I spent 45 minutes of my time on the unit/floor managing the care of Jamilah Jiménez excluding time performing procedures. Upon evaluation, this patient had an acute onset of aphasia s/p TNK which required my direct attention, intervention, and personal management. Greater than 50% of my time was spent at the bedside counseling the patient and/or coordinating care including chart review, history, exam, documentation, and further activities per this note. I have personally reviewed the following data/imaging over the past 24 hours.     The patient was discussed with the NCC attending, Dr. Mercado.    Niesha ORELLANA CNP  Neurocritical care nurse practitioner  Pager: [phone]  Ascom: *[phone] available M-Orellana [phone] to [phone]     24 Hour Vital Signs Summary:  Temp: 97.9  F (36.6  C) Temp  Min: 97.9  F (36.6  C)  Max: 98.2  F (36.8  C)  Resp: 14 Resp  Min: 12  Max: 23  SpO2: 99 % SpO2  Min: 93 %  Max: 100 %  Pulse: 81 Pulse  Min: 72  Max: 95  BP: 114/66 Systolic (24hrs), Av , Min:92 , Max:170   Diastolic (24hrs), Av, Min:52, Max:96    Respiratory monitoring:   Resp: 14  Room air     I/O last 3 completed shifts:  In: 1369 [P.O.:1369]  Out: 1550 [Urine:1550]    Current Medications:   atorvastatin  80 mg Oral or Feeding Tube At Bedtime    [Held by provider] dapagliflozin  10 mg Oral or Feeding Tube QAM    heparin ANTICOAGULANT  5,000 Units Subcutaneous Q8H    hydrALAZINE  50 mg Oral or Feeding Tube Q6H    insulin aspart   Subcutaneous Daily with breakfast    insulin aspart   Subcutaneous  "Daily with lunch    insulin aspart  1-10 Units Subcutaneous TID AC    insulin aspart  1-7 Units Subcutaneous At Bedtime    insulin detemir  40 Units Subcutaneous At Bedtime    polyethylene glycol  17 g Oral or Feeding Tube Daily    [Held by provider] potassium chloride  60 mEq Oral or Feeding Tube TID    [Held by provider] sacubitril-valsartan  1 tablet Oral BID    senna-docusate  1-2 tablet Oral or Feeding Tube BID    sertraline  150 mg Oral or Feeding Tube Daily    sodium chloride (PF)  3 mL Intracatheter Q8H    [Held by provider] spironolactone  50 mg Oral or Feeding Tube QAM    [Held by provider] torsemide  100 mg Oral or Feeding Tube BID       PRN Medications:  acetaminophen, - MEDICATION INSTRUCTIONS -, glucose **OR** dextrose **OR** glucagon, HOLD MEDICATION, hydrALAZINE, hydrOXYzine, lidocaine 4%, lidocaine (buffered or not buffered), - MEDICATION INSTRUCTIONS -, menthol (Topical Analgesic) 2.5%, naloxone **OR** naloxone **OR** naloxone **OR** naloxone, ondansetron, oxyCODONE, prochlorperazine, BETA BLOCKER NOT PRESCRIBED, sodium chloride (PF), traZODone    Infusions:   - MEDICATION INSTRUCTIONS -      - MEDICATION INSTRUCTIONS -      BETA BLOCKER NOT PRESCRIBED         Allergies   Allergen Reactions    Aspirin Hives     Told nursing on 6/1/22 she breaks out in hives if she takes aspirin.    Ibuprofen Hives    Lisinopril Cough    Oxycodone-Acetaminophen Nausea and Vomiting       Physical Examination:  Vitals: /66   Pulse 81   Temp 97.9  F (36.6  C) (Oral)   Resp 14   Ht 1.702 m (5' 7.01\")   Wt 77.8 kg (171 lb 8.3 oz)   SpO2 99%   BMI 26.86 kg/m    General: Adult female patient, lying in bed  HEENT: Normocephalic, atraumatic, no icterus  Cardiac: Sinus rhythm on bedside monitor   Pulm: Unlabored, expansion symmetric, no retractions or use of accessory muscles  Abdomen: Soft, non-distended abdomen   Extremities: Warm, no edema, appears adequately perfused  Skin: No rash or lesion observed on " exposed skin   Psych: Calm and cooperative  Neuro:  Mental status: Awake, alert, attentive, oriented to self, time, place, and circumstance. Language is fluent and coherent with intact comprehension of complex commands, naming and repetition.  Cranial nerves: VFF, PERRL, conjugate gaze, EOMI, facial sensation intact, face symmetric, shoulder shrug strong, tongue midline, mild dysarthria (transient).  Motor: Normal bulk and tone. No abnormal movements. 5/5 strength in 4/4 extremities.   Sensory: Intact to light touch x 4 extremities  Coordination: FNF and HS without ataxia or dysmetria.   Gait: REBECCA, deferred.      [employer] Stroke Scale  Exam Interval: 0730   Score    Level of consciousness: (0)   Alert, keenly responsive    LOC questions: (0)   Answers both questions correctly    LOC commands: (0)   Performs both tasks correctly    Best gaze: (0)   Normal    Visual: (0)   No visual loss    Facial palsy: (0)   Normal symmetrical movements    Motor arm (left): (0)   No drift    Motor arm (right): (0)   No drift    Motor leg (left): (0)   No drift    Motor leg (right): (0)   No drift    Limb ataxia: (0)   Absent    Sensory: (0)   Normal- no sensory loss    Best language: (0)   No aphasia, normal     Dysarthria: (1)   Mild to moderate dysarthria    Extinction and inattention: (0)   No abnormality        Total Score:  1        Labs and Imaging:    Results for orders placed or performed during the hospital encounter of 08/31/23 (from the past 24 hour(s))   Glucose by meter   Result Value Ref Range    GLUCOSE BY METER POCT 149 (H) 70 - 99 mg/dL   Glucose by meter   Result Value Ref Range    GLUCOSE BY METER POCT 278 (H) 70 - 99 mg/dL   Glucose by meter   Result Value Ref Range    GLUCOSE BY METER POCT 135 (H) 70 - 99 mg/dL   Glucose by meter   Result Value Ref Range    GLUCOSE BY METER POCT 127 (H) 70 - 99 mg/dL   Glucose by meter   Result Value Ref Range    GLUCOSE BY METER POCT 211 (H) 70 - 99 mg/dL    Glucose by meter   Result Value Ref Range    GLUCOSE BY METER POCT 147 (H) 70 - 99 mg/dL        All relevant imaging and laboratory values personally reviewed.

## 2023-09-05 NOTE — CONSULTS
Stroke Education Note     The following information has been reviewed with the patient and family:  Did stroke education with patient and her cousin.      1. Warning signs of stroke     2. Calling [phone] if having warning signs of stroke     3. All modifiable risk factors: hypertension, CAD, atrial fib, diabetes, hypercholesterolemia, smoking, substance abuse, diet, physical inactivity, obesity, sleep apnea.     4. Patient's risk factors for stroke which include: heart disease, DMT2, HTN     5. Follow-up plan for after discharge     6. Discharge medications which include: Lipitor, hydralazine, other meds TBD     In addition, the above information was given to the patient and family in writing as a part of the [facility] Stroke Class Handout.     Learner's response to risk factors / lifestyle modification education: Need to change      Nunu Edmonds RN

## 2023-09-05 NOTE — PROGRESS NOTES
Major Shift Events:  Neuro: No acute events overnight. Neuro status unchanged. R pupil slightly larger than left. Slow and slightly slurred speech, no change. Has headache, prn meds given. Hemodynamically stable on room air. 0200 dose of hydralazine held for systolic <100. Voiding spontaneously.   Plan: Continue plan of care, transfer when bed available.   For vital signs and complete assessments, please see documentation flowsheets.

## 2023-09-05 NOTE — CONSULTS
Dental Hygiene Consult Service        Jamilah Jiménez MRN# [id number]   YOB: 1975 Age: 47 year old     Date of Admission: 8/31/2023  PCP is No Ref-Primary, Physician  Date of Service: 9/5/2023           Reason for Consult:   Referring MD & Reason for Visit: I was asked by Anthony Cruz MD, to see Jamilah Jiménez for oral assessment regarding heart transplant evaluation.         History of Present Illness:   This patient is a 47 year old female with a history of HFrEF, Class III, Stage C-D, DM2, HTN, PE admitted following RHC with CI 1.61. Admitted for further evaluation and consideration for advanced heart failure therapies.  Dental and oropharyngeal history is pertinent for requiring dental clearance prior to heart transplant; dental CT completed 9/1/23 (findings and impression follow); pt has established dental home. The patient reports breaking a tooth approximately one month ago while eating a pistachio (pt points to #12 - upper left premolar); there has been no pain, as this tooth has been RCT treated. Pt states that she has been trying to get scheduled with her dentist to have it removed, but has been unable to due to recent health concerns.  #27-F (lower right canine) tooth was also chipped when pt fell and hit her face - no pain or sensitivity experienced.    Dental CT:  FINDINGS: Multifocal scattered dental caries and dental  reconstruction. Bilateral mandibular unerupted third molar teeth.  Second and third maxillary molar teeth are removed. Bilateral  mandibular second molar and mandibular second premolar teeth. Most  notably there is a large cavity in the left maxillary first premolar  tooth with periapical lucency and dehiscence of the adjacent outer  maxillary cortical bone. No significant soft tissue swelling or mass.  Normal facial bone alignment. Mucosal thickening in the floor of right  maxillary sinus. Temporomandibular joints are grossly normal.                                                                    IMPRESSION:Multifocal scattered dental caries and dental  reconstruction. Most notably there is a large cavity in the left  maxillary first premolar tooth, associated with periapical lucency and  dehiscence of the adjacent outer maxillary cortical bone, compatible  with periapical periodontitis.       General Observations   Level of support Patient is fully independent   Patient currently in pain No   Patient wears dentures No   Current oral care routine Toothbrushing BID using soft-bristled manual toothbrush, fluoridated toothpaste; has been unable to floss since admit (typically flosses daily); rare use of antibacterial rinse.   Patient has oral care products Toothbrush, Toothpaste and Mouthrinse   Extraoral assessment   General appearance Symmetrical and Normal TMJ function   Lymph nodes Symmetrical, no abnormalities, non-tender   Oral Health Assessment Tool (OHAT)   Lips 0=Healthy: smooth, pink, moist   Tongue 0=Healthy: normal, moist, roughness, pink   Gums and Tissues 0=Healthy: pink, moist, smooth, no bleeding   Saliva 0=Healthy: moist tissues, watery and free flowing saliva   Natural Teeth Yes/No 1=Changes: (ie. 1-3 decayed or broken teeth/roots or very worn down teeth) - Tooth #12 is broken near the gumline, #27-F along gumline is fractured   Dentures Yes/No Patient does not wear dentures   Oral Cleanliness 0=Healthy: clean and no food particles or tartar in mouth or dentures   Dental Pain 0=Healthy: no behavioural, verbal, or physical signs of dental pain   OHAT Total Score (0-16) 1   Other Dental Concerns Patient does not have dental home and Infrequent professional dental care   Care provided during assessment Oral Assessment, Connect with other provider and Patient education   Follow up  assessments required? No   Connect with [facility] or [place] care? Yes: [place] dental team will review DH findings and CT results   For Dental Team Service Requesting Consult: CARDS 2 -  HF  Clearance/Reason: heart txp/LVAD eval  Dental CT status: completed 9/1  Upcoming Sx date(s), if known: TBD  Expected discharge date, if known: TBD  Ability to transfer to [facility]: yes (pt has pacemaker and ICD)  Pain reported, by pt: none  Swelling present: none  Current dental Rx regimen: none   Oral Care Plan Continue toothbrushing BID using soft-bristled manual toothbrush and fluoridated toothpaste (both provided during today's assessment). Interdental cleaning daily, gave pt sample of soft picks. Pt can use mouthrinse if desired BID. Explained importance of continuing diligent oral care with current cardiovascular and endocrine concerns.    Encouraged pt to resume routine preventive dental care when medically cleared to do so. Explained that if [facility] dental clinic is unable to complete recommended treatment while pt is inpatient, then pt should try to schedule an appointment at her established dental clinic on an outpatient basis.             Assessment and Plan:   Assessment:  This patient is a 47 year old female with a history of HFrEF, Class III, Stage C-D, DM2, HTN, PE admitted following RHC with CI 1.61. Admitted for further evaluation and consideration for advanced heart failure therapies, oral exam concerning for tooth #12 is broken near the gumline, #27-F is fractured along gumline; no swelling, bleeding, or pain reported by pt.    Plan:    Dr. Junaid Murphy, DMD will review DH note and dental CT results to determine patient's dental needs and clearance status.    Pt to implement oral care plan as described above. Pt is currently independent in oral cares.    SHAKIR Gant  Pager: [phone]      Past Medical History   I have reviewed this patient's medical history and updated it with pertinent information if needed.  Past Medical History:   Diagnosis Date     Anxiety      Diabetes (H)     TYPE II     Hypertension        Past Surgical History   I have reviewed this patient's surgical history  and updated it with pertinent information if needed.  Past Surgical History:   Procedure Laterality Date     APPENDECTOMY      INCIDENTAL WITH C SECTION     CV CARDIAC CATHERIZATION       CV RIGHT HEART CATH MEASUREMENTS RECORDED N/A 8/31/2023    Procedure: Heart Cath Right Heart Cath;  Surgeon: Alexander Stevens MD;  Location:  HEART CARDIAC CATH LAB     EP ICD       EYE SURGERY Left     DETACHED RETINA     GYN SURGERY      TUBAL LIGATION     ORTHOPEDIC SURGERY      SHOULDER SURGERY     ORTHOPEDIC SURGERY      KNEE ARTHROSCOPY       Social History   I have reviewed this patient's social history and updated it with pertinent information if needed.  Social History     Tobacco Use     Smoking status: Never     Smokeless tobacco: Never   Vaping Use     Vaping Use: Never used   Substance Use Topics     Alcohol use: Not Currently     Drug use: Not Currently     Prior to Admission Medications   Prior to Admission Medications   Prescriptions Last Dose Informant Patient Reported? Taking?   Vitamin D, Cholecalciferol, 10 MCG (400 UNIT) TABS  Self Yes Yes   Sig: Take 400 Units by mouth daily   atorvastatin (LIPITOR) 20 MG tablet 8/30/2023 at 2200  Yes Yes   Sig: Take 20 mg by mouth At Bedtime   benzonatate (TESSALON) 200 MG capsule  Self Yes Yes   Sig: Take 200 mg by mouth 3 times daily as needed for cough   carvedilol (COREG) 3.125 MG tablet   Yes No   Sig: Take 3.125 mg by mouth 2 times daily   dapagliflozin (FARXIGA) 10 MG TABS tablet 8/30/2023 at 0830  Yes Yes   Sig: Take 10 mg by mouth every morning   digoxin (LANOXIN) 125 MCG tablet  Self Yes Yes   Sig: Take 250 mcg by mouth daily   folic acid (FOLVITE) 1 MG tablet 8/30/2023 at 0830  Yes Yes   Sig: Take 1 mg by mouth daily   insulin aspart (NOVOLOG FLEXPEN) 100 UNIT/ML pen 8/30/2023 at 1800  Yes Yes   Sig: Inject 1-10 Units Subcutaneous 4 times daily (with meals and nightly) SLIDING SCALE   insulin detemir (LEVEMIR PEN) 100 UNIT/ML pen 8/30/2023 at 2200  Yes Yes    Sig: Inject 30 Units Subcutaneous At Bedtime   metolazone (ZAROXOLYN) 2.5 MG tablet More than a month  Yes Yes   Sig: Take 2.5 mg by mouth daily as needed   nitroGLYcerin (NITROSTAT) 0.4 MG sublingual tablet  Self Yes Yes   Sig: Place 0.4 mg under the tongue every 5 minutes as needed for chest pain For chest pain place 1 tablet under the tongue every 5 minutes for 3 doses. If symptoms persist 5 minutes after 1st dose call 911.   potassium chloride ER (KLOR-CON M) 20 MEQ CR tablet 8/30/2023 at 0830  Yes Yes   Sig: Take 60 mEq by mouth 3 times daily   sacubitril-valsartan (ENTRESTO)  MG per tablet 8/30/2023 at 2200  Yes Yes   Sig: Take 1 tablet by mouth 2 times daily   sertraline (ZOLOFT) 100 MG tablet  Self Yes Yes   Sig: Take 100 mg by mouth daily   spironolactone (ALDACTONE) 25 MG tablet 8/30/2023 at 0830  Yes Yes   Sig: Take 50 mg by mouth every morning   torsemide (DEMADEX) 20 MG tablet 8/30/2023 at 2200  Yes Yes   Sig: Take 100 mg by mouth 2 times daily      Facility-Administered Medications: None     Allergies   Allergies   Allergen Reactions     Aspirin Hives     Told nursing on 6/1/22 she breaks out in hives if she takes aspirin.     Ibuprofen Hives     Lisinopril Cough     Oxycodone-Acetaminophen Nausea and Vomiting     Physical Exam

## 2023-09-05 NOTE — PLAN OF CARE
ICU End of Shift Summary. See flowsheets for vital signs and detailed assessment.    Changes this shift: A/O x 4, slow speech but not slurred. Pupils equal. Neuro intact. Steady gait and equal strength, walked in halls x3. VSS on RA, hypertensive at times. Voiding, no BM this shift. Reg diet with Na restriction and 2L fluid restriction. Brain MRI complete.     Plan:  Transfer to floor when able. Continue advanced heart therapy workup.    Goal Outcome Evaluation:      Plan of Care Reviewed With: patient    Overall Patient Progress: improvingOverall Patient Progress: improving    Outcome Evaluation: VSS on RA

## 2023-09-06 ENCOUNTER — APPOINTMENT (OUTPATIENT)
Dept: OCCUPATIONAL THERAPY | Facility: CLINIC | Age: 48
End: 2023-09-06
Payer: MEDICAID

## 2023-09-06 LAB
ANION GAP SERPL CALCULATED.3IONS-SCNC: 12 MMOL/L (ref 7–15)
ANION GAP SERPL CALCULATED.3IONS-SCNC: 14 MMOL/L (ref 7–15)
BUN SERPL-MCNC: 26.3 MG/DL (ref 6–20)
BUN SERPL-MCNC: 32.3 MG/DL (ref 6–20)
CALCIUM SERPL-MCNC: 8.8 MG/DL (ref 8.6–10)
CALCIUM SERPL-MCNC: 9 MG/DL (ref 8.6–10)
CHLORIDE SERPL-SCNC: 104 MMOL/L (ref 98–107)
CHLORIDE SERPL-SCNC: 105 MMOL/L (ref 98–107)
CREAT SERPL-MCNC: 1.26 MG/DL (ref 0.51–0.95)
CREAT SERPL-MCNC: 1.42 MG/DL (ref 0.51–0.95)
DEPRECATED HCO3 PLAS-SCNC: 22 MMOL/L (ref 22–29)
DEPRECATED HCO3 PLAS-SCNC: 22 MMOL/L (ref 22–29)
ERYTHROCYTE [DISTWIDTH] IN BLOOD BY AUTOMATED COUNT: 13.5 % (ref 10–15)
ERYTHROCYTE [DISTWIDTH] IN BLOOD BY AUTOMATED COUNT: 13.5 % (ref 10–15)
GFR SERPL CREATININE-BSD FRML MDRD: 46 ML/MIN/1.73M2
GFR SERPL CREATININE-BSD FRML MDRD: 53 ML/MIN/1.73M2
GLUCOSE BLDC GLUCOMTR-MCNC: 108 MG/DL (ref 70–99)
GLUCOSE BLDC GLUCOMTR-MCNC: 110 MG/DL (ref 70–99)
GLUCOSE BLDC GLUCOMTR-MCNC: 133 MG/DL (ref 70–99)
GLUCOSE BLDC GLUCOMTR-MCNC: 153 MG/DL (ref 70–99)
GLUCOSE BLDC GLUCOMTR-MCNC: 189 MG/DL (ref 70–99)
GLUCOSE SERPL-MCNC: 111 MG/DL (ref 70–99)
GLUCOSE SERPL-MCNC: 185 MG/DL (ref 70–99)
HCT VFR BLD AUTO: 35 % (ref 35–47)
HCT VFR BLD AUTO: 38.2 % (ref 35–47)
HGB BLD-MCNC: 11.8 G/DL (ref 11.7–15.7)
HGB BLD-MCNC: 12.2 G/DL (ref 11.7–15.7)
HOLD SPECIMEN: NORMAL
MAGNESIUM SERPL-MCNC: 2.5 MG/DL (ref 1.7–2.3)
MCH RBC QN AUTO: 27.7 PG (ref 26.5–33)
MCH RBC QN AUTO: 28.2 PG (ref 26.5–33)
MCHC RBC AUTO-ENTMCNC: 31.9 G/DL (ref 31.5–36.5)
MCHC RBC AUTO-ENTMCNC: 33.7 G/DL (ref 31.5–36.5)
MCV RBC AUTO: 84 FL (ref 78–100)
MCV RBC AUTO: 87 FL (ref 78–100)
MDC_IDC_LEAD_IMPLANT_DT: NORMAL
MDC_IDC_LEAD_IMPLANT_DT: NORMAL
MDC_IDC_LEAD_LOCATION: NORMAL
MDC_IDC_LEAD_LOCATION: NORMAL
MDC_IDC_LEAD_LOCATION_DETAIL_1: NORMAL
MDC_IDC_LEAD_LOCATION_DETAIL_1: NORMAL
MDC_IDC_LEAD_MFG: NORMAL
MDC_IDC_LEAD_MFG: NORMAL
MDC_IDC_LEAD_MODEL: NORMAL
MDC_IDC_LEAD_MODEL: NORMAL
MDC_IDC_LEAD_POLARITY_TYPE: NORMAL
MDC_IDC_LEAD_POLARITY_TYPE: NORMAL
MDC_IDC_LEAD_SERIAL: NORMAL
MDC_IDC_LEAD_SERIAL: NORMAL
MDC_IDC_LEAD_SPECIAL_FUNCTION: NORMAL
MDC_IDC_LEAD_SPECIAL_FUNCTION: NORMAL
MDC_IDC_MSMT_BATTERY_DTM: NORMAL
MDC_IDC_MSMT_BATTERY_REMAINING_LONGEVITY: 153 MO
MDC_IDC_MSMT_BATTERY_REMAINING_LONGEVITY: 154 MO
MDC_IDC_MSMT_BATTERY_RRT_TRIGGER: NORMAL
MDC_IDC_MSMT_BATTERY_VOLTAGE: 3.03 V
MDC_IDC_MSMT_BATTERY_VOLTAGE: 3.03 V
MDC_IDC_MSMT_CAP_CHARGE_DTM: NORMAL
MDC_IDC_MSMT_CAP_CHARGE_ENERGY: 18 J
MDC_IDC_MSMT_CAP_CHARGE_TIME: 4.1 S
MDC_IDC_MSMT_CAP_CHARGE_TIME: 4.1 S
MDC_IDC_MSMT_CAP_CHARGE_TYPE: NORMAL
MDC_IDC_MSMT_LEADCHNL_RV_IMPEDANCE_VALUE: 456 OHM
MDC_IDC_MSMT_LEADCHNL_RV_IMPEDANCE_VALUE: 551 OHM
MDC_IDC_MSMT_LEADCHNL_RV_IMPEDANCE_VALUE: 551 OHM
MDC_IDC_MSMT_LEADCHNL_RV_PACING_THRESHOLD_AMPLITUDE: 0.5 V
MDC_IDC_MSMT_LEADCHNL_RV_PACING_THRESHOLD_AMPLITUDE: 0.5 V
MDC_IDC_MSMT_LEADCHNL_RV_PACING_THRESHOLD_AMPLITUDE: 0.75 V
MDC_IDC_MSMT_LEADCHNL_RV_PACING_THRESHOLD_PULSEWIDTH: 0.4 MS
MDC_IDC_MSMT_LEADCHNL_RV_SENSING_INTR_AMPL: 20 MV
MDC_IDC_MSMT_LEADCHNL_RV_SENSING_INTR_AMPL: 22.8 MV
MDC_IDC_PG_IMPLANT_DTM: NORMAL
MDC_IDC_PG_IMPLANT_DTM: NORMAL
MDC_IDC_PG_MFG: NORMAL
MDC_IDC_PG_MFG: NORMAL
MDC_IDC_PG_MODEL: NORMAL
MDC_IDC_PG_MODEL: NORMAL
MDC_IDC_PG_SERIAL: NORMAL
MDC_IDC_PG_SERIAL: NORMAL
MDC_IDC_PG_TYPE: NORMAL
MDC_IDC_PG_TYPE: NORMAL
MDC_IDC_SESS_CLINIC_NAME: NORMAL
MDC_IDC_SESS_CLINIC_NAME: NORMAL
MDC_IDC_SESS_DTM: NORMAL
MDC_IDC_SESS_DTM: NORMAL
MDC_IDC_SESS_TYPE: NORMAL
MDC_IDC_SESS_TYPE: NORMAL
MDC_IDC_SET_BRADY_HYSTRATE: NORMAL
MDC_IDC_SET_BRADY_HYSTRATE: NORMAL
MDC_IDC_SET_BRADY_LOWRATE: 40 {BEATS}/MIN
MDC_IDC_SET_BRADY_LOWRATE: 40 {BEATS}/MIN
MDC_IDC_SET_BRADY_MODE: NORMAL
MDC_IDC_SET_BRADY_MODE: NORMAL
MDC_IDC_SET_LEADCHNL_RV_PACING_AMPLITUDE: 2 V
MDC_IDC_SET_LEADCHNL_RV_PACING_AMPLITUDE: 2 V
MDC_IDC_SET_LEADCHNL_RV_PACING_ANODE_ELECTRODE_1: NORMAL
MDC_IDC_SET_LEADCHNL_RV_PACING_ANODE_ELECTRODE_1: NORMAL
MDC_IDC_SET_LEADCHNL_RV_PACING_ANODE_LOCATION_1: NORMAL
MDC_IDC_SET_LEADCHNL_RV_PACING_ANODE_LOCATION_1: NORMAL
MDC_IDC_SET_LEADCHNL_RV_PACING_CAPTURE_MODE: NORMAL
MDC_IDC_SET_LEADCHNL_RV_PACING_CAPTURE_MODE: NORMAL
MDC_IDC_SET_LEADCHNL_RV_PACING_CATHODE_ELECTRODE_1: NORMAL
MDC_IDC_SET_LEADCHNL_RV_PACING_CATHODE_ELECTRODE_1: NORMAL
MDC_IDC_SET_LEADCHNL_RV_PACING_CATHODE_LOCATION_1: NORMAL
MDC_IDC_SET_LEADCHNL_RV_PACING_CATHODE_LOCATION_1: NORMAL
MDC_IDC_SET_LEADCHNL_RV_PACING_POLARITY: NORMAL
MDC_IDC_SET_LEADCHNL_RV_PACING_POLARITY: NORMAL
MDC_IDC_SET_LEADCHNL_RV_PACING_PULSEWIDTH: 0.4 MS
MDC_IDC_SET_LEADCHNL_RV_PACING_PULSEWIDTH: 0.4 MS
MDC_IDC_SET_LEADCHNL_RV_SENSING_ANODE_ELECTRODE_1: NORMAL
MDC_IDC_SET_LEADCHNL_RV_SENSING_ANODE_ELECTRODE_1: NORMAL
MDC_IDC_SET_LEADCHNL_RV_SENSING_ANODE_LOCATION_1: NORMAL
MDC_IDC_SET_LEADCHNL_RV_SENSING_ANODE_LOCATION_1: NORMAL
MDC_IDC_SET_LEADCHNL_RV_SENSING_CATHODE_ELECTRODE_1: NORMAL
MDC_IDC_SET_LEADCHNL_RV_SENSING_CATHODE_ELECTRODE_1: NORMAL
MDC_IDC_SET_LEADCHNL_RV_SENSING_CATHODE_LOCATION_1: NORMAL
MDC_IDC_SET_LEADCHNL_RV_SENSING_CATHODE_LOCATION_1: NORMAL
MDC_IDC_SET_LEADCHNL_RV_SENSING_POLARITY: NORMAL
MDC_IDC_SET_LEADCHNL_RV_SENSING_POLARITY: NORMAL
MDC_IDC_SET_LEADCHNL_RV_SENSING_SENSITIVITY: 0.3 MV
MDC_IDC_SET_LEADCHNL_RV_SENSING_SENSITIVITY: 0.3 MV
MDC_IDC_SET_ZONE_DETECTION_BEATS_DENOMINATOR: 40 {BEATS}
MDC_IDC_SET_ZONE_DETECTION_BEATS_DENOMINATOR: 40 {BEATS}
MDC_IDC_SET_ZONE_DETECTION_BEATS_NUMERATOR: 30 {BEATS}
MDC_IDC_SET_ZONE_DETECTION_BEATS_NUMERATOR: 30 {BEATS}
MDC_IDC_SET_ZONE_DETECTION_INTERVAL: 300 MS
MDC_IDC_SET_ZONE_DETECTION_INTERVAL: 300 MS
MDC_IDC_SET_ZONE_DETECTION_INTERVAL: 350 MS
MDC_IDC_SET_ZONE_DETECTION_INTERVAL: 350 MS
MDC_IDC_SET_ZONE_DETECTION_INTERVAL: 360 MS
MDC_IDC_SET_ZONE_DETECTION_INTERVAL: 360 MS
MDC_IDC_SET_ZONE_TYPE: NORMAL
MDC_IDC_STAT_AT_BURDEN_PERCENT: 0 %
MDC_IDC_STAT_AT_BURDEN_PERCENT: 0 %
MDC_IDC_STAT_AT_DTM_END: NORMAL
MDC_IDC_STAT_AT_DTM_START: NORMAL
MDC_IDC_STAT_BRADY_DTM_END: NORMAL
MDC_IDC_STAT_BRADY_DTM_START: NORMAL
MDC_IDC_STAT_BRADY_RV_PERCENT_PACED: 0.1 %
MDC_IDC_STAT_BRADY_RV_PERCENT_PACED: 0.1 %
MDC_IDC_STAT_CRT_DTM_END: NORMAL
MDC_IDC_STAT_CRT_DTM_START: NORMAL
MDC_IDC_STAT_EPISODE_RECENT_COUNT: 0
MDC_IDC_STAT_EPISODE_RECENT_COUNT_DTM_END: NORMAL
MDC_IDC_STAT_EPISODE_RECENT_COUNT_DTM_START: NORMAL
MDC_IDC_STAT_EPISODE_TOTAL_COUNT: 0
MDC_IDC_STAT_EPISODE_TOTAL_COUNT: 3
MDC_IDC_STAT_EPISODE_TOTAL_COUNT_DTM_END: NORMAL
MDC_IDC_STAT_EPISODE_TOTAL_COUNT_DTM_START: NORMAL
MDC_IDC_STAT_EPISODE_TYPE: NORMAL
MDC_IDC_STAT_TACHYTHERAPY_ATP_DELIVERED_RECENT: 0
MDC_IDC_STAT_TACHYTHERAPY_ATP_DELIVERED_TOTAL: 0
MDC_IDC_STAT_TACHYTHERAPY_RECENT_DTM_END: NORMAL
MDC_IDC_STAT_TACHYTHERAPY_RECENT_DTM_START: NORMAL
MDC_IDC_STAT_TACHYTHERAPY_SHOCKS_ABORTED_RECENT: 0
MDC_IDC_STAT_TACHYTHERAPY_SHOCKS_ABORTED_TOTAL: 0
MDC_IDC_STAT_TACHYTHERAPY_SHOCKS_DELIVERED_RECENT: 0
MDC_IDC_STAT_TACHYTHERAPY_SHOCKS_DELIVERED_TOTAL: 0
MDC_IDC_STAT_TACHYTHERAPY_TOTAL_DTM_END: NORMAL
MDC_IDC_STAT_TACHYTHERAPY_TOTAL_DTM_START: NORMAL
PHOSPHATE SERPL-MCNC: 3 MG/DL (ref 2.5–4.5)
PLATELET # BLD AUTO: 167 10E3/UL (ref 150–450)
PLATELET # BLD AUTO: 176 10E3/UL (ref 150–450)
POTASSIUM SERPL-SCNC: 3.5 MMOL/L (ref 3.4–5.3)
POTASSIUM SERPL-SCNC: 3.6 MMOL/L (ref 3.4–5.3)
POTASSIUM SERPL-SCNC: 3.6 MMOL/L (ref 3.4–5.3)
RBC # BLD AUTO: 4.19 10E6/UL (ref 3.8–5.2)
RBC # BLD AUTO: 4.41 10E6/UL (ref 3.8–5.2)
SODIUM SERPL-SCNC: 138 MMOL/L (ref 136–145)
SODIUM SERPL-SCNC: 141 MMOL/L (ref 136–145)
WBC # BLD AUTO: 3.8 10E3/UL (ref 4–11)
WBC # BLD AUTO: 5.1 10E3/UL (ref 4–11)

## 2023-09-06 PROCEDURE — 99233 SBSQ HOSP IP/OBS HIGH 50: CPT | Mod: 25 | Performed by: INTERNAL MEDICINE

## 2023-09-06 PROCEDURE — 250N000013 HC RX MED GY IP 250 OP 250 PS 637: Performed by: STUDENT IN AN ORGANIZED HEALTH CARE EDUCATION/TRAINING PROGRAM

## 2023-09-06 PROCEDURE — 84100 ASSAY OF PHOSPHORUS: CPT | Performed by: THORACIC SURGERY (CARDIOTHORACIC VASCULAR SURGERY)

## 2023-09-06 PROCEDURE — 97535 SELF CARE MNGMENT TRAINING: CPT | Mod: GO

## 2023-09-06 PROCEDURE — 250N000013 HC RX MED GY IP 250 OP 250 PS 637: Performed by: INTERNAL MEDICINE

## 2023-09-06 PROCEDURE — 272N000001 HC OR GENERAL SUPPLY STERILE: Performed by: INTERNAL MEDICINE

## 2023-09-06 PROCEDURE — C1751 CATH, INF, PER/CENT/MIDLINE: HCPCS | Performed by: INTERNAL MEDICINE

## 2023-09-06 PROCEDURE — 83735 ASSAY OF MAGNESIUM: CPT | Performed by: THORACIC SURGERY (CARDIOTHORACIC VASCULAR SURGERY)

## 2023-09-06 PROCEDURE — 120N000003 HC R&B IMCU UMMC

## 2023-09-06 PROCEDURE — 93451 RIGHT HEART CATH: CPT | Mod: 26 | Performed by: INTERNAL MEDICINE

## 2023-09-06 PROCEDURE — 93451 RIGHT HEART CATH: CPT | Performed by: INTERNAL MEDICINE

## 2023-09-06 PROCEDURE — 97530 THERAPEUTIC ACTIVITIES: CPT | Mod: GO

## 2023-09-06 PROCEDURE — 36415 COLL VENOUS BLD VENIPUNCTURE: CPT | Performed by: THORACIC SURGERY (CARDIOTHORACIC VASCULAR SURGERY)

## 2023-09-06 PROCEDURE — 85027 COMPLETE CBC AUTOMATED: CPT

## 2023-09-06 PROCEDURE — 80048 BASIC METABOLIC PNL TOTAL CA: CPT

## 2023-09-06 PROCEDURE — 250N000011 HC RX IP 250 OP 636: Performed by: NURSE PRACTITIONER

## 2023-09-06 PROCEDURE — 4A023N6 MEASUREMENT OF CARDIAC SAMPLING AND PRESSURE, RIGHT HEART, PERCUTANEOUS APPROACH: ICD-10-PCS | Performed by: INTERNAL MEDICINE

## 2023-09-06 PROCEDURE — 200N000002 HC R&B ICU UMMC

## 2023-09-06 PROCEDURE — 250N000009 HC RX 250: Performed by: INTERNAL MEDICINE

## 2023-09-06 PROCEDURE — 250N000013 HC RX MED GY IP 250 OP 250 PS 637

## 2023-09-06 PROCEDURE — 250N000013 HC RX MED GY IP 250 OP 250 PS 637: Performed by: PHYSICIAN ASSISTANT

## 2023-09-06 RX ORDER — POTASSIUM CHLORIDE 750 MG/1
20 TABLET, EXTENDED RELEASE ORAL ONCE
Status: COMPLETED | OUTPATIENT
Start: 2023-09-06 | End: 2023-09-06

## 2023-09-06 RX ADMIN — HYDRALAZINE HYDROCHLORIDE 50 MG: 50 TABLET, FILM COATED ORAL at 00:05

## 2023-09-06 RX ADMIN — DEXTROSE 15 G: 15 GEL ORAL at 14:57

## 2023-09-06 RX ADMIN — ACETAMINOPHEN 650 MG: 325 TABLET, FILM COATED ORAL at 16:26

## 2023-09-06 RX ADMIN — TRAZODONE HYDROCHLORIDE 50 MG: 50 TABLET ORAL at 20:43

## 2023-09-06 RX ADMIN — HEPARIN SODIUM 5000 UNITS: 5000 INJECTION, SOLUTION INTRAVENOUS; SUBCUTANEOUS at 08:04

## 2023-09-06 RX ADMIN — OXYCODONE HYDROCHLORIDE 5 MG: 5 TABLET ORAL at 16:27

## 2023-09-06 RX ADMIN — POTASSIUM CHLORIDE 20 MEQ: 750 TABLET, EXTENDED RELEASE ORAL at 03:48

## 2023-09-06 RX ADMIN — HEPARIN SODIUM 5000 UNITS: 5000 INJECTION, SOLUTION INTRAVENOUS; SUBCUTANEOUS at 00:05

## 2023-09-06 RX ADMIN — ATORVASTATIN CALCIUM 80 MG: 80 TABLET, FILM COATED ORAL at 20:29

## 2023-09-06 RX ADMIN — HYDROXYZINE HYDROCHLORIDE 25 MG: 25 TABLET, FILM COATED ORAL at 17:42

## 2023-09-06 RX ADMIN — HYDRALAZINE HYDROCHLORIDE 50 MG: 50 TABLET, FILM COATED ORAL at 06:35

## 2023-09-06 RX ADMIN — OXYCODONE HYDROCHLORIDE 5 MG: 5 TABLET ORAL at 22:26

## 2023-09-06 RX ADMIN — SACUBITRIL AND VALSARTAN 1 TABLET: 24; 26 TABLET, FILM COATED ORAL at 12:24

## 2023-09-06 RX ADMIN — ACETAMINOPHEN 650 MG: 325 TABLET, FILM COATED ORAL at 03:48

## 2023-09-06 RX ADMIN — SACUBITRIL AND VALSARTAN 1 TABLET: 24; 26 TABLET, FILM COATED ORAL at 17:42

## 2023-09-06 RX ADMIN — ACETAMINOPHEN 650 MG: 325 TABLET, FILM COATED ORAL at 22:25

## 2023-09-06 RX ADMIN — INSULIN DETEMIR 40 UNITS: 100 INJECTION, SOLUTION SUBCUTANEOUS at 22:24

## 2023-09-06 RX ADMIN — POTASSIUM CHLORIDE 20 MEQ: 750 TABLET, EXTENDED RELEASE ORAL at 09:59

## 2023-09-06 RX ADMIN — SERTRALINE HYDROCHLORIDE 150 MG: 100 TABLET ORAL at 08:04

## 2023-09-06 RX ADMIN — OXYCODONE HYDROCHLORIDE 5 MG: 5 TABLET ORAL at 08:04

## 2023-09-06 RX ADMIN — HEPARIN SODIUM 5000 UNITS: 5000 INJECTION, SOLUTION INTRAVENOUS; SUBCUTANEOUS at 16:27

## 2023-09-06 RX ADMIN — ACETAMINOPHEN 650 MG: 325 TABLET, FILM COATED ORAL at 08:04

## 2023-09-06 RX ADMIN — OXYCODONE HYDROCHLORIDE 5 MG: 5 TABLET ORAL at 00:05

## 2023-09-06 ASSESSMENT — ACTIVITIES OF DAILY LIVING (ADL)
ADLS_ACUITY_SCORE: 20

## 2023-09-06 ASSESSMENT — VISUAL ACUITY
OU: BASELINE
OU: BASELINE

## 2023-09-06 NOTE — PLAN OF CARE
Goal Outcome Evaluation:  Major Shift Events:  HA managed with tylenol and oxycodone. Pt slept comfortably and then c/o 9/10 HA pain upon writer waking her up for neuro assessment and medications.     Pt walked in halls SBA. Asking for water frequently, required multiple reminders about sodium and fluid restriction. Pt's nephew brought her t[employer] prior to writer assuming care of her. Pt id drink 32 oz brown soda. Pt stated she was unaware that there was salt in soda.     Pt attests that she is speaking closer to her baseline      Plan: continue q4h neuros, pt requiring further education on diet management with HF.   For vital signs and complete assessments, please see documentation flowsheets.       Problem: Plan of Care - These are the overarching goals to be used throughout the patient stay.    Goal: Optimal Comfort and Wellbeing  Outcome: Progressing  Intervention: Monitor Pain and Promote Comfort  Recent Flowsheet Documentation  Taken 9/6/2023 0400 by Rosie Shaikh RN  Pain Management Interventions: medication (see MAR)  Taken 9/6/2023 0000 by Rosie Shaikh RN  Pain Management Interventions: medication (see MAR)  Taken 9/5/2023 2000 by Rosie Shaikh RN  Pain Management Interventions: medication (see MAR)  Intervention: Provide Person-Centered Care  Recent Flowsheet Documentation  Taken 9/6/2023 0000 by Rosie Shaikh RN  Trust Relationship/Rapport:   choices provided   care explained   emotional support provided   empathic listening provided   questions answered   questions encouraged   reassurance provided   thoughts/feelings acknowledged  Taken 9/5/2023 2000 by Rosie Shaikh RN  Trust Relationship/Rapport:   choices provided   care explained   emotional support provided   empathic listening provided   questions answered   questions encouraged   reassurance provided   thoughts/feelings acknowledged     Problem: Heart Failure  Goal: Optimal Functional Ability  Outcome:  Progressing  Intervention: Optimize Functional Ability  Recent Flowsheet Documentation  Taken 9/6/2023 0000 by Rosie Shaikh, RN  Activity Management:   ambulated in room   ambulated outside room  Taken 9/5/2023 2000 by Rosie Shaikh, RN  Activity Management:   ambulated in room   ambulated outside room

## 2023-09-06 NOTE — PROGRESS NOTES
[facility]   Cardiology II Service / Advanced Heart Failure  Daily Progress Note  Date of Service: 9/1/2023      Patient: Jamilah Jiménez  MRN: [id number]  Admission Date: 8/31/2023  Hospital Day # 6    Assessment and Plan:  Jamilah Jiménez is a 47-year-old female with HFrEF, Class III, Stage C-D, DM2, HTN, PE admitted following RHC with CI 1.61. Admitted for further evaluation and consideration for advanced heart failure therapies.     Changes today:  - MRI scan today with no concerns for stroke  - Right heat cath ordered   - will hold adding plavix at this time given current eval. for LVAD and Transplant  - Will started GDMT therapy.   - Restarted Entresto 24-26 mg, Spironolactone 50 mg and Faxiga 10 mg.   - Continue to hold Torsemide given euvolemia  - Reached out to dental will have to make appt for clinic visit.         #Nausea  #Aphasia  #Right-sided numbness and weakness   On 09/02, she developed acute-onset expressive aphasia and right-sided weakness/numbness, CTH was negative for acute bleeding, but did show an area of hypodensity in the left parietal lobe consistent with old stroke. CTA was negative for LVO. Given a clinical syndrome consistent with acute ischemic stroke localizing to the left frontal and parietal lobes, and an absence of contraindications to thrombolytic therapy, tenecteplase (TNK) was administered. She was transferred to the neurocritical care unit for further management. Similar episode 09/03 where she was unable to articulate her name or date of birth with slow and slurred speech and word finding difficulty. Throughout the episode, was noted to have purposeful, strong, equal strength bilaterally. Repeat Head CT without evidence of intracranial hemorrhage, stable appearance of patchy hypoattenuation of the left parietal periventricular matter.  Seen by psych 09/04/23 noted to have some component of conversion disorder.   - seen by neurology and psychiatry  -  MRI scan 09/05/23 will follow up   - Increased sertraline to 150 mg, trazodone 50 mg at bedtime           #HFrEF (LVEF 18%), non-ischemic cardiomyopathy (genetic) s/p ICD  #HTN   #HLD   ACC/AHA Stage D, NYHA Symptom Class IV  Patient with progressively worsening dyspnea, edema, overall functional status over the last ~ 2 years. 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 Has been on diuretics, GDMT, has ICD in place.     Primary Cardiologist: Dr. Saucedo Last seen 12/06/2022   Ischemic Eval: Coronary angiogram 12/24/2020 with no significant coronary artery disease, elevated LVEDP 24 mmHg   RHC 08/31/2023: RA 9; PA 41/22/30; Amina CO/CI 3.04/1.61   CMR 08/31/2023: Severely dilated LV with global systolic function severely reduced, LVEF 18%, severe diffuse hypokinesis, RV with normal cavity size and normal global systolic function, RVEF 53%  Cardiopulmonary Stress Test 09/01: Peak VO2 12.70 ml/kg/min; VE/VCO2 59.11; RER 0.99      Volume: Euvolemic   Diuretic: Torsemide 100 mg BID (held 09/03/23, ION)  ACE-I/ARB/ARNi: Entresto 24-26 BID   BB: Carvedilol 3.125 mg BID (will hold in setting of low CI)   Aldosterone antagonist: Spironolactone 50 mg  SGLT2i: dapagliflozin 10 mg   Hydral/Nitrates: hydralazine 25 TID held 09/06/23   SCD prophylaxis: ICD 11/2022   Statin: Atorvastatin 80 mg        DATE MAP CVP PAP PCWP Amina CO Amina CI SVR MVo2 Therapies Weight    08/31/2023   9 41/22/30 19 3.04 1.61       172 lbs    10/2022   12 76/35/50 38 3.25 1.72       169 lbs      LVAD/Transplant Evaluation Checklist  [x] Labs (CBC, CMP, PT/INR, cystatin C, prealbumin, UA + micro) Cystatin 1.6  [x] Infectious (Hep A/B/C, HIV, treponema, HSV 1/2 IgG, CMV IgG, Toxo IgG, EBV IgG, varicella IgG, Quant gold, COVID vaccine/PCR)  [x] Utox/nicotine and cotinine/PeTH   [x] Immunocompatibility (last transfusion, ABO, HLA tissue typing, PRA)  [x] ECG, Echo - 08/31/2023: Severely dilated LV with global systolic  function severely reduced, LVEF 18%, severe diffuse hypokinesis, RV with normal cavity size and normal global systolic function, RVEF 53%  [x] CPX 9/1/2023- Peak VO2 12.70 ml/kg/min; VE/VCO2 59.11; RER 0.99   [] 6MWT   [x] RHC - 8/31/2023: RA 9; PA 41/22/30; Amina CO/CI 3.04/1.61   [x] CVTS consult  [] Social work consult  [] Palliative care consult  [] Neuropsych consult  [x] Nutrition consult  [x] CT Dental   [] Dental consult  [x] Abd US + doppler  [x] Extremity US and ABIs  [x] Carotid US (if DM or ICM or >49yo)  [] PFTs  [] Dexa  [x] CT head non-contrast  [x] CT CAP non-contrast -  Sub-6 mm pulmonary nodules. Optional follow-up CT of the chest in  one year, if patient is high risk per Fleischner criteria  [] colonoscopy (>49 yo)  [x] HCG  [] mammogram (>41 yo)  [] Pap test     #Type 2 DM   Most recent Hgb A1c 7.8 on 08/10/2023  - Detemir 30 U at bedtime  - [facility]      Rosa Maria Dunbar MD  PGY-2   Internal Medicine Resident     ================================================================    Interval History/ROS:   No acute event overnight. Seen by dental and Neurology, MRI showed no concerns for stroke. No chest pain, SOB, dizziness or leg swelling,.        Medications: Reviewed in EPIC.     Physical Exam:   Temp:  [97.3  F (36.3  C)-98.6  F (37  C)] 98.5  F (36.9  C)  Pulse:  [77-97] 93  Resp:  [11-31] 16  BP: (115-162)/(61-97) 162/85  SpO2:  [95 %-100 %] 100 %      GEN: No acute distress   HEENT: EOMI, no icterus, moist mucous membranes   CV: RRR, normal s1/s2, no murmurs. JVP ~8cm   CHEST: Normal work of breathing. Lungs CTAB, no wheezes or crackles.   ABD: Soft, Non-tender. Fluid wave and abdomenal distention present.    EXT: Warm and well-perfused, trace edema  NEURO: Awake, alert, answering questions appropriately, motor grossly nonfocal  PSYCH: cooperative, affect appropriate    Data:  Lab Results   Component Value Date    WBC 3.8 (L) 09/06/2023    HGB 12.2 09/06/2023    HCT 38.2 09/06/2023      09/06/2023     09/06/2023    POTASSIUM 3.6 09/06/2023    POTASSIUM 3.6 09/06/2023    CHLORIDE 105 09/06/2023    CO2 22 09/06/2023    BUN 26.3 (H) 09/06/2023    CR 1.26 (H) 09/06/2023     (H) 09/06/2023    NTBNPI 1,425 (H) 08/31/2023    AST 23 09/02/2023    ALT 13 09/02/2023    ALKPHOS 93 09/02/2023    BILITOTAL 1.0 09/02/2023    INR 1.15 09/03/2023                Lab Results   Component Value Date     09/05/2023    Lab Results   Component Value Date    CHLORIDE 99 09/05/2023    Lab Results   Component Value Date    BUN 38.1 09/05/2023      Lab Results   Component Value Date    POTASSIUM 3.7 09/05/2023    Lab Results   Component Value Date    CO2 23 09/05/2023    Lab Results   Component Value Date    CR 1.46 09/05/2023        Lab Results   Component Value Date    WBC 3.8 (L) 09/06/2023    HGB 12.2 09/06/2023    HCT 38.2 09/06/2023    MCV 87 09/06/2023     09/06/2023         Imaging:    CT Head w/o Contrast (09/03/2023)  MPRESSION:   1. No CT evidence of intracranial hemorrhage.  2. Stable appearance of patchy hypoattenuation of the left parietal  periventricular white matter.     CT Head w/o Contrast (09/02/2023)  Impression:   1. No evidence of intracranial hemorrhage.  2. Head CTA demonstrates no aneurysm or stenosis of the major  intracranial arteries.   3. Neck CTA demonstrates no stenosis of the major cervical arteries.  Mild atherosclerotic changes of bilateral carotid bifurcations.  4. Mild patchy low attenuation within the left parietal white matter;   stable from yesterday's study; this might represent old infarct or  gliotic changes. However may consider MRI to exclude acute infarct  given the limitations of C    CTA Head Neck (09/02/2023)  Impression:   1. No evidence of intracranial hemorrhage.  2. Head CTA demonstrates no aneurysm or stenosis of the major  intracranial arteries.   3. Neck CTA demonstrates no stenosis of the major cervical arteries.  Mild atherosclerotic changes  of bilateral carotid bifurcations.  4. Mild patchy low attenuation within the left parietal white matter;   stable from yesterday's study; this might represent old infarct or  gliotic changes. However may consider MRI to exclude acute infarct  given the limitations of CT.     Chest CT 9/1/2023  IMPRESSION:   1. No acute or suspicious abnormalities in the chest, abdomen, or  pelvis.  2. Sub-6 mm pulmonary nodules. Optional follow-up CT of the chest in  one year, if patient is high risk per Fleischner criteria    CT Head w/o contrast 9/1/2023  No suspicious intracranial finding. Subtle patchy  hypodensities in the left parietal subcortical white matter, likely  related to gliosis from underlying chronic small vessel disease.    CT Dental wo contrast 9/1/2023  Multifocal scattered dental caries and dental  reconstruction. Most notably there is a large cavity in the left  maxillary first premolar tooth, associated with periapical lucency and  dehiscence of the adjacent outer maxillary cortical bone, compatible  with periapical periodontitis.    CXR 9/1/2023  ICD in place.    US BI Doppler 9/1/2023  1. RIGHT:       A. Resting MARLENE is normal, 1.18.     2. LEFT:       A. Resting MARLENE is normal, 1.11.    Upper Extremity US 9/1/2023  RIGHT:  Subclavian artery, medial: 64/0 cm/s, triphasic, 10.6 mm  Subclavian artery, mid: 62/0 cm/s, triphasic, 8.7 mm  Subclavian artery, lateral: 62/0 cm/s, triphasic, 9.1 mm     Axillary artery: 56/0 cm/s, triphasic, 8.2 mm     LEFT:  Subclavian artery, medial: 76/0 cm/s, triphasic, 8.5 mm  Subclavian artery, mid: 105/0 cm/s, triphasic, 8.6 mm  Subclavian artery, lateral: 71/0 cm/s, triphasic, 7.9 mm     Axillary artery: 69/0 cm/s, triphasic, 8.1 mm                                                                      IMPRESSION: Patent bilateral subclavian and axillary arteries with  measurements as in the report.    Carotid US 9/1/2023  RIGHT ICA: Less than 50% diameter narrowing by grayscale  imaging  and sonographic velocity criteria.     LEFT ICA:  Less than 50% diameter narrowing by grayscale imaging  and sonographic velocity criteria

## 2023-09-06 NOTE — PLAN OF CARE
Major Shift Events:  Neurologically intact. Reporting 10/10 throbbing headache, gave PRN tylenol and oxycodone x2 with decrease in pain. Ambulated in hallway x2. NSR 80s-90s. SBP in 160s, Cards 2 team aware, they said its okay if she is a little above 160 and said to give evening entresto early and treat patient's anxiety with PRN atarax. Afebrile. RA, clear LS. 2gm Na diet with 2L fluid restriction, good appetite. BG well controlled. Gave one dose of glucose gel for symptoms of hypoglycemia although BG was 108, notified/okay'd by Cards 2. Voiding. Last BM 9/5.   Plan: Continue to monitor, transfer to floor when bed becomes available.  For vital signs and complete assessments, please see documentation flowsheets.

## 2023-09-06 NOTE — PROGRESS NOTES
Neurocritical Care Progress Note    Reason for critical care admission: Acute HFrEF   Admitting Team: Cardiology    Date of Service:  09/06/2023  Date of Admission:  8/31/2023  Hospital Day: 7    Assessment/Plan  Jamilah Jiménez is a 47-year-old female with HFrEF, Class III, Stage C-D, DM2, HTN, PE admitted following RHC with CI 1.61, who has been admitted since 8/31 for further evaluation and consideration for advanced heart failure therapies. She developed acute-onset expressive aphasia and right-sided weakness/numbness on 9/2, with LKW at 1040. Initial NIHSS of 7 for a right homonomous hemianopia, RUE and RLE drift, sensory loss over the right hemibody, mild expressive aphasia, and mild dysarthria. CTH was negative for acute bleeding, but did show an area of hypodensity in the left parietal lobe consistent with old stroke. CTA was negative for LVO. Given a clinical syndrome consistent with acute ischemic stroke localizing to the left frontal and parietal lobes, and an absence of contraindications to thrombolytic therapy, tenecteplase (TNK) was administered at 1142 on 9/2. Patient was transferred to the neurocritical care unit for further management. Stroke mechanism is undetermined at this time, but patient does have multiple risk factors for cardioembolic processes given her severely reduced EF.    MR brain obtained on 9/5/2023 did not show evidence of acute ischemic stroke. However, given prompt administration of TNK we cannot exclude that she didn't have one altogether. Therefore, we would recommend a secondary stroke prevention such as Aspirin for 3 months, but, given her allergy to Aspirin we would recommend Plavix 75 mg instead.      Neuro  #Acute onset aphasia, transient    #Concern for acute ischemic stroke of the left cerebral hemisphere s/p TNK administration, uncertain mechanism at this time  -9/5 MR brain without acute ischemic stroke  -Given Aspirin allergy, would recommend Plavix for secondary  stroke prevention x3 months  -Follow up with primary care provider in 3 months to see if Plavix should be continued  -Patient is ok for anticoagulation should she need for the future   -Continue Atorvastatin 80 mg daily   -PT/OT  -Stroke Education  -Depression Screen  -Apnea screening questions  -NCC team will sign off. Please call [phone] for any further questions or concerns.      Niesha ORELLANA CNP  Neurocritical care nurse practitioner  Pager: [phone]  Ascom: *[phone] [facility] 1100 to 1702

## 2023-09-06 NOTE — CONSULTS
Patient of Dr. Owens seen as an inpatient for psychosocial assessment.   60 minutes spent with patient and cousin, Dinora, via phone. 100% of visit consisted of counseling related to issues surrounding LVAD and Heart Transplant.    Psychosocial Assessment   Name: Jamilah Jiménez     MRN:  [id number]        Patient Name / Age / Race: Jamilah Jiménez 47 year old  and     Source of Information: Patient (in-person) and Dinora, via phone   : JEANNE Simms   Interview Date: September 6, 2023   Reason for Referral: Heart Transplant and Mechanical Circulatory Support     Current Living Situation   Location (City/State):    [place] [address]   With Whom: Living arrangements - the patient  lives with her mom, Kristi, and her 5yo dtr, Chana    Type of Home: single family-all needs met on first floor   Working Phone? Yes    [employer]? Yes    Distance from Hospital: 5hrs    Need to Relocate Post Surgery? Yes    Discussed? Yes -discussed [employer] Apartments and local hotel. Written information given on the "[employer]"ments      Vocational/Employment/Financial   Employment: Unemployed-last worked 2yrs ago   Occupation: Has hx of working in retail and    Education: 11th grade   [place]? No   Income: other: no income--pt's mother financially supports her and her 5yo dtr. Pt gets some financial help from father of her child, but this is not consistent    Insurance[place]    Name of Private Insurance: NA   Medication Coverage: Yes    In current situation, pt. can afford medication and supply costs:  Yes    Other Financial Concerns/Issues: Patient has no income and is dependent on her mother and extended family. [place] and has benefits for temporary relocation.      Family/Social Support   Marital Status:    Partner Name: [place]   Length of Time : 2 yrs   Partner s Employment: Currently Incarcerated--scheduled to be  "released 2024   Relationship: stable/supportive   Children: 2 sons-21 yo and 26 and two dtrs- 7yo and 24   Parents: Mother-Kristi--has epilepsy Father-    Siblings: 1 sister    Other Family or Friends Close by: Extended family-cousins, aunts and uncles   Support System: available, helpful   Primary Support Person: Pt and Dinora report pt has a lot of support and at this time unsure who the primary caregiver will be.    Issues: Need to confirm caregiver.      Activities/Functional Ability   Current Level: ambulatory, independent with ADL's   Daily Activities: primary caregiver to her 7yo dtr   Transportation: self      Medical Status   Patient s understanding of need for surgical intervention: Good understanding    Advanced Directive? No    Details: Given copy and interested in completing    Adherence History: Self-reports good adherence to attending medical appts and taking her medications.    Ability to Adhere to Complex Medical Regime: Feels she would be able to manage what is required of her for LVAD or Heart Transplant.      Behavioral   Chemical Dependency Issues: Yes : Pt reports no alcohol use for 2 years. Reports heavier use greater then 15yrs ago. No hx of CD treatment or legal consequences due to alcohol.     Pt reports hx of methamphetamine use. Reports she started using in  and initially reported last use of 2022. Upon further review of records, there are positive drug screens for methamphetamine 2023 and 3/5/2023. Discussed this discrepancy with pt and she then admitted that she used \"a little bit\" at those times. It was difficult to obtain more information on how much pt was using. She reports no legal consequences due to methamphetamine use. Pt reports she has stopped on her own and did not require CD treatment. Pt denies hx of any other drug use.    Smoker? No: Pt denies hx of smoking, vaping or smokeless tobacco, or smoking cessation products   Psychiatric impairment: " Yes : Reports significant anxiety. Seen by psychiatry this admission for concerns for conversion disorder. Pt has no hx of psychiatric admissions or suicide attempts.    Coping Style/Strategies: Smudging    Recent Legal History: No: Remote hx of penitentiary time due to Social Security Fraud    Teaching Completed During Assessment Related To Transplant and Mechanical Circulatory Support: Housing and relocation needs post surgery.  Caregiver needs post surgery.  Financial issues related to surgery.  Risks of alcohol use post surgery.  Common psychosocial stressors pre/post surgery.  Support group availability.   Psychosocial Risks Reviewed Related To Transplant and Mechanical Circulatory Support: Increased stress related to your emotional, family, social, employment, or financial situation.  Affect on work and/or disability benefits.  Affect on future health and life insurance.  Outcome expectations may not be met.  Mental Health risks: anxiety, depression, PTSD, guilt, grief and chronic fatigue.     Observed Behavior   Well informed? Yes  Angry? No   Process info well? Yes  Hostile? No   Evasive? Yes  Oriented X3? Yes    Cautious/Suspicious? No Motivated? Yes    Appropriate Behavior? Yes  Depressed? No   Appropriate Affect? Yes  Anxious? No   Interview Observations: Pt was pleasant and engaged. Appeared evasive in talking about her hx of methamphetamine use.      Selection Criteria Met   Plan for Support Yes    Chemical Dependence No   Smoking Yes    Mental Health Yes    Adequate Finances Yes      Risk Issues:    -Patient was not forthcoming with methamphetamine hx--positive drug screens 2/9/2023 and 3/5/2023.   -Patient has no income     Final Evaluation/Assessment:     Social Work evaluation as part of heart transplant and LVAD evaluation. [place] Pt lives with her mother and 7yo dtr. Pt has been independent w/ ADLs, IADL caregiver to her 7yo and ambulation, but reports feeling  "\"slowed down\" in managing daily tasks due to heart failure symptoms. Pt has been unemployed for the last two years, and prior has worked in  and a nursing home.     Discussed caregiver requirements with pt and her cousin, Dinora, whom was on the phone. They both report there will be a plan for a caregiver, but cannot confirm who in the family will be the primary caregiver. Although pt lives with her mother, mother has health issues that would not make her an appropriate caregiver.    Writer has concerns with chemical dependency around methamphetamine use. Pt was not initially forthcoming on when she last used methamphetamine. Pt reported last use September 2022, but when informed of more recent positive drug screens then admitted to using at those times. Pt is vague on how often she was using at that time. Would benefit from more time within our program, to continue to drug test to ensure abstinence. She has no hx of being evaluated for CD concerns nor legal consequences. No concerns with smoking.     Patient does appear to have significant anxiety by observation and self-report. She appears to have good insight into this and felt that being seen my psychiatry during this admission was beneficial and agreeable to their recommendations. Pt reports she maybe interested in psychotherapy, but will think about this and reach out to her local care team to setup, if she does want to pursue this.     Finances are a concern as pt does not have any income. Pt reports family has been financially supporting her and will continue to do so. Pt does have [place] that will cover temporary relocation costs and follow-up in our program. Currently this should not be a barrier towards candidacy.     From a psychosocial perspective, pt is currently a conditional candidate for advanced therapies. In an urgent situation, writer would favor LVAD. Currently, would have concerns with heart transplant due to fairly recent " methamphetamine use and pt not being forthcoming with this information initially. Will need to also confirm caregiver plan. Would benefit from more time in our program to get to know her.         Patient understands risks and benefits of Heart Transplant and Mechanical Circulatory Support: Yes     Recommendation:    conditional    Signature: Patricia Mondragon [facility]     Title: Licensed Independent Clinical                Interview Date: September 6, 2023

## 2023-09-07 ENCOUNTER — HOSPITAL (OUTPATIENT)
Dept: NEUROLOGY | Facility: CLINIC | Age: 48
End: 2023-09-07
Payer: MEDICAID

## 2023-09-07 LAB
ANION GAP SERPL CALCULATED.3IONS-SCNC: 12 MMOL/L (ref 7–15)
BUN SERPL-MCNC: 17.1 MG/DL (ref 6–20)
CALCIUM SERPL-MCNC: 8.4 MG/DL (ref 8.6–10)
CHLORIDE SERPL-SCNC: 105 MMOL/L (ref 98–107)
CREAT SERPL-MCNC: 1.11 MG/DL (ref 0.51–0.95)
DEPRECATED HCO3 PLAS-SCNC: 21 MMOL/L (ref 22–29)
EGFRCR SERPLBLD CKD-EPI 2021: 62 ML/MIN/1.73M2
ERYTHROCYTE [DISTWIDTH] IN BLOOD BY AUTOMATED COUNT: 13.3 % (ref 10–15)
GLUCOSE BLDC GLUCOMTR-MCNC: 183 MG/DL (ref 70–99)
GLUCOSE BLDC GLUCOMTR-MCNC: 192 MG/DL (ref 70–99)
GLUCOSE SERPL-MCNC: 105 MG/DL (ref 70–99)
HCT VFR BLD AUTO: 32.8 % (ref 35–47)
HGB BLD-MCNC: 11.2 G/DL (ref 11.7–15.7)
MAGNESIUM SERPL-MCNC: 2 MG/DL (ref 1.7–2.3)
MCH RBC QN AUTO: 28.6 PG (ref 26.5–33)
MCHC RBC AUTO-ENTMCNC: 34.1 G/DL (ref 31.5–36.5)
MCV RBC AUTO: 84 FL (ref 78–100)
PHOSPHATE SERPL-MCNC: 2.4 MG/DL (ref 2.5–4.5)
PLATELET # BLD AUTO: 158 10E3/UL (ref 150–450)
POTASSIUM SERPL-SCNC: 3.9 MMOL/L (ref 3.4–5.3)
POTASSIUM SERPL-SCNC: 3.9 MMOL/L (ref 3.4–5.3)
RBC # BLD AUTO: 3.91 10E6/UL (ref 3.8–5.2)
SODIUM SERPL-SCNC: 138 MMOL/L (ref 136–145)
WBC # BLD AUTO: 3.5 10E3/UL (ref 4–11)

## 2023-09-07 PROCEDURE — 94060 EVALUATION OF WHEEZING: CPT

## 2023-09-07 PROCEDURE — 83735 ASSAY OF MAGNESIUM: CPT | Performed by: PHYSICIAN ASSISTANT

## 2023-09-07 PROCEDURE — 36415 COLL VENOUS BLD VENIPUNCTURE: CPT

## 2023-09-07 PROCEDURE — 120N000003 HC R&B IMCU UMMC

## 2023-09-07 PROCEDURE — 92526 ORAL FUNCTION THERAPY: CPT | Mod: GN

## 2023-09-07 PROCEDURE — 82310 ASSAY OF CALCIUM: CPT

## 2023-09-07 PROCEDURE — 99233 SBSQ HOSP IP/OBS HIGH 50: CPT | Mod: GC | Performed by: INTERNAL MEDICINE

## 2023-09-07 PROCEDURE — 36415 COLL VENOUS BLD VENIPUNCTURE: CPT | Performed by: PHYSICIAN ASSISTANT

## 2023-09-07 PROCEDURE — 250N000013 HC RX MED GY IP 250 OP 250 PS 637

## 2023-09-07 PROCEDURE — 250N000013 HC RX MED GY IP 250 OP 250 PS 637: Performed by: INTERNAL MEDICINE

## 2023-09-07 PROCEDURE — 250N000011 HC RX IP 250 OP 636: Performed by: NURSE PRACTITIONER

## 2023-09-07 PROCEDURE — 84100 ASSAY OF PHOSPHORUS: CPT | Performed by: PHYSICIAN ASSISTANT

## 2023-09-07 PROCEDURE — 250N000013 HC RX MED GY IP 250 OP 250 PS 637: Performed by: PHYSICIAN ASSISTANT

## 2023-09-07 PROCEDURE — 250N000013 HC RX MED GY IP 250 OP 250 PS 637: Performed by: STUDENT IN AN ORGANIZED HEALTH CARE EDUCATION/TRAINING PROGRAM

## 2023-09-07 PROCEDURE — 999N000128 HC STATISTIC PERIPHERAL IV START W/O US GUIDANCE

## 2023-09-07 PROCEDURE — 84132 ASSAY OF SERUM POTASSIUM: CPT | Performed by: INTERNAL MEDICINE

## 2023-09-07 PROCEDURE — 96133 NRPSYC TST EVAL PHYS/QHP EA: CPT | Performed by: CLINICAL NEUROPSYCHOLOGIST

## 2023-09-07 PROCEDURE — 250N000011 HC RX IP 250 OP 636: Mod: JZ | Performed by: PHYSICIAN ASSISTANT

## 2023-09-07 PROCEDURE — 96132 NRPSYC TST EVAL PHYS/QHP 1ST: CPT | Performed by: CLINICAL NEUROPSYCHOLOGIST

## 2023-09-07 PROCEDURE — 85027 COMPLETE CBC AUTOMATED: CPT

## 2023-09-07 PROCEDURE — 90791 PSYCH DIAGNOSTIC EVALUATION: CPT | Performed by: CLINICAL NEUROPSYCHOLOGIST

## 2023-09-07 PROCEDURE — 87086 URINE CULTURE/COLONY COUNT: CPT | Performed by: INTERNAL MEDICINE

## 2023-09-07 RX ORDER — METOPROLOL SUCCINATE 25 MG/1
25 TABLET, EXTENDED RELEASE ORAL DAILY
Status: DISCONTINUED | OUTPATIENT
Start: 2023-09-07 | End: 2023-09-11 | Stop reason: HOSPADM

## 2023-09-07 RX ORDER — MAGNESIUM SULFATE HEPTAHYDRATE 40 MG/ML
2 INJECTION, SOLUTION INTRAVENOUS ONCE
Status: COMPLETED | OUTPATIENT
Start: 2023-09-07 | End: 2023-09-07

## 2023-09-07 RX ORDER — SPIRONOLACTONE 25 MG/1
25 TABLET ORAL EVERY MORNING
Status: DISCONTINUED | OUTPATIENT
Start: 2023-09-08 | End: 2023-09-11 | Stop reason: HOSPADM

## 2023-09-07 RX ADMIN — POTASSIUM & SODIUM PHOSPHATES POWDER PACK 280-160-250 MG 1 PACKET: 280-160-250 PACK at 17:41

## 2023-09-07 RX ADMIN — MAGNESIUM SULFATE HEPTAHYDRATE 2 G: 2 INJECTION, SOLUTION INTRAVENOUS at 09:43

## 2023-09-07 RX ADMIN — OXYCODONE HYDROCHLORIDE 5 MG: 5 TABLET ORAL at 16:25

## 2023-09-07 RX ADMIN — HEPARIN SODIUM 5000 UNITS: 5000 INJECTION, SOLUTION INTRAVENOUS; SUBCUTANEOUS at 10:10

## 2023-09-07 RX ADMIN — POTASSIUM & SODIUM PHOSPHATES POWDER PACK 280-160-250 MG 1 PACKET: 280-160-250 PACK at 11:53

## 2023-09-07 RX ADMIN — METOPROLOL SUCCINATE 25 MG: 25 TABLET, EXTENDED RELEASE ORAL at 11:52

## 2023-09-07 RX ADMIN — ACETAMINOPHEN 650 MG: 325 TABLET, FILM COATED ORAL at 16:26

## 2023-09-07 RX ADMIN — HYDROXYZINE HYDROCHLORIDE 25 MG: 25 TABLET, FILM COATED ORAL at 00:35

## 2023-09-07 RX ADMIN — POTASSIUM & SODIUM PHOSPHATES POWDER PACK 280-160-250 MG 1 PACKET: 280-160-250 PACK at 10:01

## 2023-09-07 RX ADMIN — HEPARIN SODIUM 5000 UNITS: 5000 INJECTION, SOLUTION INTRAVENOUS; SUBCUTANEOUS at 17:41

## 2023-09-07 RX ADMIN — SPIRONOLACTONE 50 MG: 50 TABLET ORAL at 10:03

## 2023-09-07 RX ADMIN — HYDROXYZINE HYDROCHLORIDE 25 MG: 25 TABLET, FILM COATED ORAL at 14:31

## 2023-09-07 RX ADMIN — OXYCODONE HYDROCHLORIDE 5 MG: 5 TABLET ORAL at 10:03

## 2023-09-07 RX ADMIN — ATORVASTATIN CALCIUM 80 MG: 80 TABLET, FILM COATED ORAL at 20:13

## 2023-09-07 RX ADMIN — DAPAGLIFLOZIN 10 MG: 10 TABLET, FILM COATED ORAL at 10:04

## 2023-09-07 RX ADMIN — TRAZODONE HYDROCHLORIDE 50 MG: 50 TABLET ORAL at 23:26

## 2023-09-07 RX ADMIN — OXYCODONE HYDROCHLORIDE 5 MG: 5 TABLET ORAL at 04:07

## 2023-09-07 RX ADMIN — SACUBITRIL AND VALSARTAN 1 TABLET: 24; 26 TABLET, FILM COATED ORAL at 10:08

## 2023-09-07 RX ADMIN — HEPARIN SODIUM 5000 UNITS: 5000 INJECTION, SOLUTION INTRAVENOUS; SUBCUTANEOUS at 00:34

## 2023-09-07 RX ADMIN — SERTRALINE HYDROCHLORIDE 150 MG: 100 TABLET ORAL at 10:04

## 2023-09-07 RX ADMIN — OXYCODONE HYDROCHLORIDE 5 MG: 5 TABLET ORAL at 23:15

## 2023-09-07 RX ADMIN — ACETAMINOPHEN 650 MG: 325 TABLET, FILM COATED ORAL at 02:13

## 2023-09-07 RX ADMIN — HEPARIN SODIUM 5000 UNITS: 5000 INJECTION, SOLUTION INTRAVENOUS; SUBCUTANEOUS at 23:26

## 2023-09-07 RX ADMIN — SACUBITRIL AND VALSARTAN 1 TABLET: 49; 51 TABLET, FILM COATED ORAL at 20:13

## 2023-09-07 RX ADMIN — INSULIN DETEMIR 40 UNITS: 100 INJECTION, SOLUTION SUBCUTANEOUS at 21:35

## 2023-09-07 ASSESSMENT — ACTIVITIES OF DAILY LIVING (ADL)
ADLS_ACUITY_SCORE: 20

## 2023-09-07 NOTE — PLAN OF CARE
Transferred to: Unit 6C  at 0230  Belongings: all with patient   Lopez removed? No: Not present   Central line removed? NA  Chart and medications sent with patient Yes  Family notified: No: Patient states she will notify her mother

## 2023-09-07 NOTE — PROGRESS NOTES
D/she is up and about, and workup continues.she continues to take oxycodone for persistent headache since her previous stroke  A/workup continues  P/monitor for changes, keep her and team updated

## 2023-09-07 NOTE — PLAN OF CARE
"  I: Monitored vitals and assessed pt status.   Changes during this shift:     Running:   PRN Med: oxy x1     Vitals:  Blood pressure (!) 141/73, pulse 76, temperature 98.6  F (37  C), temperature source Oral, resp. rate 21, height 1.702 m (5' 7\"), weight 80.7 kg (177 lb 14.4 oz), SpO2 98 %.    A:   Neuro: Ax4 Denies dizziness,  Lightheadedness, numbness and tingling. Acknowledges headache.  Cardiac: SR HR 70s Afebrile, VSS.  Denies chest pain.   Respiratory: sating >95 ON RA. denies SOB. LS clear bilaterally.   Diet/appetite: 2gNa Diet, 2L FR. Good appetite.   Endocrine: BS check ACHS and carb count. Pt on sliding scale  insulin   GI/:  Last BM 9/6. Denies abdominal pain. Good urine output.   Activity:  Moves independently  Pain: Acknowledges headache pain managed with PRN oxy.  Skin: RPIV SL, R internal jugular site swollen and tender, perimeter traced, team is aware.   Plan: Continue to monitor and follow plan of care. Notify Cards 2 team of changes in patient status. Possible discharge home Sunday.  "

## 2023-09-07 NOTE — PROGRESS NOTES
The patient was seen for neuropsychological evaluation at the request of Sarmad Sharp MD for the purposes of diagnostic clarification and treatment planning. 188 minutes of test administration and scoring were provided by this writer. Please see Dr. Ted Branham's report for a full interpretation of the findings.    Nicki Hernandez  Psychometrist

## 2023-09-07 NOTE — PROGRESS NOTES
[facility]   Cardiology II Service / Advanced Heart Failure  Daily Progress Note  Date of Service: 9/1/2023      Patient: Jamilah Jiménez  MRN: [id number]  Admission Date: 8/31/2023  Hospital Day # 7    Assessment and Plan:  Jamilah Jiménez is a 47-year-old female with HFrEF, Class III, Stage C-D, DM2, HTN, PE admitted following RHC with CI 1.61. Admitted for further evaluation and consideration for advanced heart failure therapies.     Changes today:  - Started on Metoprolol XL 25 mg   - Spirinolactone decreased to 25 mg   - Entresto increased to 49-51 mg   - Continue to hold diuretics   - Mammogram and Coloscopy and PaP smear ordered for LVAD/transplant work up  - Dental clinic appt 09/11/23 at 2 PM      #Nausea  #Aphasia  #Right-sided numbness and weakness   On 09/02, she developed acute-onset expressive aphasia and right-sided weakness/numbness, CTH was negative for acute bleeding, but did show an area of hypodensity in the left parietal lobe consistent with old stroke. CTA was negative for LVO. Given a clinical syndrome consistent with acute ischemic stroke localizing to the left frontal and parietal lobes, and an absence of contraindications to thrombolytic therapy, tenecteplase (TNK) was administered. She was transferred to the neurocritical care unit for further management. Similar episode 09/03 where she was unable to articulate her name or date of birth with slow and slurred speech and word finding difficulty. Throughout the episode, was noted to have purposeful, strong, equal strength bilaterally. Repeat Head CT without evidence of intracranial hemorrhage, stable appearance of patchy hypoattenuation of the left parietal periventricular matter.  Seen by psych 09/04/23 noted to have some component of conversion disorder.   - seen by neurology and psychiatry  - MRI scan 09/05/23 will follow up   - Increased sertraline to 150 mg, trazodone 50 mg at bedtime        #HFrEF (LVEF 18%),  non-ischemic cardiomyopathy (genetic) s/p ICD  #HTN   #HLD   ACC/AHA Stage D, NYHA Symptom Class IV  Patient with progressively worsening dyspnea, edema, overall functional status over the last ~ 2 years. 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 Has been on diuretics, GDMT, has ICD in place.     Primary Cardiologist: Dr. Saucedo Last seen 12/06/2022   Ischemic Eval: Coronary angiogram 12/24/2020 with no significant coronary artery disease, elevated LVEDP 24 mmHg   RHC 08/31/2023: RA 9; PA 41/22/30; Amina CO/CI 3.04/1.61   CMR 08/31/2023: Severely dilated LV with global systolic function severely reduced, LVEF 18%, severe diffuse hypokinesis, RV with normal cavity size and normal global systolic function, RVEF 53%  Cardiopulmonary Stress Test 09/01: Peak VO2 12.70 ml/kg/min; VE/VCO2 59.11; RER 0.99      Volume: Euvolemic   Diuretic: Torsemide 100 mg BID (held 09/03/23, ION)  ACE-I/ARB/ARNi: Entresto 49/51 BID   BB: Metoprolol XL 25 mg (started on 09/07/23)  Aldosterone antagonist: Spironolactone 25 mg (09/07/23)  SGLT2i: Dapagliflozin 10 mg   Hydral/Nitrates: hydralazine 25 TID held 09/06/23   SCD prophylaxis: ICD 11/2022   Statin: Atorvastatin 80 mg        DATE MAP CVP PAP PCWP Amina CO Amina CI SVR MVo2 Therapies Weight    08/31/2023   9 41/22/30 19 3.04 1.61       172 lbs    10/2022   12 76/35/50 38 3.25 1.72       169 lbs      LVAD/Transplant Evaluation Checklist  [x] Labs (CBC, CMP, PT/INR, cystatin C, prealbumin, UA + micro) Cystatin 1.6  [x] Infectious (Hep A/B/C, HIV, treponema, HSV 1/2 IgG, CMV IgG, Toxo IgG, EBV IgG, varicella IgG, Quant gold, COVID vaccine/PCR)  [x] Utox/nicotine and cotinine/PeTH   [x] Immunocompatibility (last transfusion, ABO, HLA tissue typing, PRA)  [x] ECG, Echo - 08/31/2023: Severely dilated LV with global systolic function severely reduced, LVEF 18%, severe diffuse hypokinesis, RV with normal cavity size and normal global systolic function, RVEF  53%  [x] CPX 9/1/2023- Peak VO2 12.70 ml/kg/min; VE/VCO2 59.11; RER 0.99   [] 6MWT   [x] RHC - 8/31/2023: RA 9; PA 41/22/30; Amina CO/CI 3.04/1.61   [x] CVTS consult  [] Social work consult  [] Palliative care consult  [x] Neuropsych consult  [x] Nutrition consult  [x] CT Dental   [x] Dental consult  [x] Abd US + doppler  [x] Extremity US and ABIs  [x] Carotid US (if DM or ICM or >49yo)  [] PFTs  [] Dexa - may be ordered outpatient   [x] CT head non-contrast  [x] CT CAP non-contrast -  Sub-6 mm pulmonary nodules. Optional follow-up CT of the chest in  one year, if patient is high risk per Fleischner criteria  [] colonoscopy (>51 yo)  [x] HCG  [] mammogram (>39 yo)  [] Pap test     #Type 2 DM   Most recent Hgb A1c 7.8 on 08/10/2023  - Detemir 30 U at bedtime  - [facility]      Rosa Maria Dunbar MD  PGY-2   Internal Medicine Resident     ================================================================    Interval History/ROS:   No acute event overnight. Overnight had headache requiring Tylenol and oxycodone. Denies headache, Sob, Chest pain, numbness and tingling, weakness.        Medications: Reviewed in EPIC.     Physical Exam:   Temp:  [97.3  F (36.3  C)-98.5  F (36.9  C)] 98  F (36.7  C)  Pulse:  [69-98] 74  Resp:  [14-32] 23  BP: (121-168)/() 136/77  SpO2:  [95 %-100 %] 98 %      GEN: No acute distress   HEENT: EOMI, no icterus, moist mucous membranes   CV: RRR, normal s1/s2, no murmurs. JVP ~8cm   CHEST: Normal work of breathing. Lungs CTAB, no wheezes or crackles.   ABD: Soft, Non-tender.    EXT: Warm and well-perfused, trace edema  NEURO: Awake, alert, answering questions appropriately, motor grossly nonfocal  PSYCH: cooperative, affect appropriate    Data:  Lab Results   Component Value Date    WBC 3.8 (L) 09/06/2023    HGB 12.2 09/06/2023    HCT 38.2 09/06/2023     09/06/2023     09/06/2023    POTASSIUM 3.9 09/07/2023    CHLORIDE 105 09/06/2023    CO2 22 09/06/2023    BUN 26.3 (H) 09/06/2023    CR  1.26 (H) 09/06/2023     (H) 09/06/2023    NTBNPI 1,425 (H) 08/31/2023    AST 23 09/02/2023    ALT 13 09/02/2023    ALKPHOS 93 09/02/2023    BILITOTAL 1.0 09/02/2023    INR 1.15 09/03/2023                Lab Results   Component Value Date     09/05/2023    Lab Results   Component Value Date    CHLORIDE 99 09/05/2023    Lab Results   Component Value Date    BUN 38.1 09/05/2023      Lab Results   Component Value Date    POTASSIUM 3.7 09/05/2023    Lab Results   Component Value Date    CO2 23 09/05/2023    Lab Results   Component Value Date    CR 1.46 09/05/2023        Lab Results   Component Value Date    WBC 3.8 (L) 09/06/2023    HGB 12.2 09/06/2023    HCT 38.2 09/06/2023    MCV 87 09/06/2023     09/06/2023         Imaging:    CT Head w/o Contrast (09/03/2023)  MPRESSION:   1. No CT evidence of intracranial hemorrhage.  2. Stable appearance of patchy hypoattenuation of the left parietal  periventricular white matter.     CT Head w/o Contrast (09/02/2023)  Impression:   1. No evidence of intracranial hemorrhage.  2. Head CTA demonstrates no aneurysm or stenosis of the major  intracranial arteries.   3. Neck CTA demonstrates no stenosis of the major cervical arteries.  Mild atherosclerotic changes of bilateral carotid bifurcations.  4. Mild patchy low attenuation within the left parietal white matter;   stable from yesterday's study; this might represent old infarct or  gliotic changes. However may consider MRI to exclude acute infarct  given the limitations of C    CTA Head Neck (09/02/2023)  Impression:   1. No evidence of intracranial hemorrhage.  2. Head CTA demonstrates no aneurysm or stenosis of the major  intracranial arteries.   3. Neck CTA demonstrates no stenosis of the major cervical arteries.  Mild atherosclerotic changes of bilateral carotid bifurcations.  4. Mild patchy low attenuation within the left parietal white matter;   stable from yesterday's study; this might represent old  infarct or  gliotic changes. However may consider MRI to exclude acute infarct  given the limitations of CT.     Chest CT 9/1/2023  IMPRESSION:   1. No acute or suspicious abnormalities in the chest, abdomen, or  pelvis.  2. Sub-6 mm pulmonary nodules. Optional follow-up CT of the chest in  one year, if patient is high risk per Fleischner criteria    CT Head w/o contrast 9/1/2023  No suspicious intracranial finding. Subtle patchy  hypodensities in the left parietal subcortical white matter, likely  related to gliosis from underlying chronic small vessel disease.    CT Dental wo contrast 9/1/2023  Multifocal scattered dental caries and dental  reconstruction. Most notably there is a large cavity in the left  maxillary first premolar tooth, associated with periapical lucency and  dehiscence of the adjacent outer maxillary cortical bone, compatible  with periapical periodontitis.    CXR 9/1/2023  ICD in place.    US BI Doppler 9/1/2023  1. RIGHT:       A. Resting MARLENE is normal, 1.18.     2. LEFT:       A. Resting MARLENE is normal, 1.11.    Upper Extremity US 9/1/2023  RIGHT:  Subclavian artery, medial: 64/0 cm/s, triphasic, 10.6 mm  Subclavian artery, mid: 62/0 cm/s, triphasic, 8.7 mm  Subclavian artery, lateral: 62/0 cm/s, triphasic, 9.1 mm     Axillary artery: 56/0 cm/s, triphasic, 8.2 mm     LEFT:  Subclavian artery, medial: 76/0 cm/s, triphasic, 8.5 mm  Subclavian artery, mid: 105/0 cm/s, triphasic, 8.6 mm  Subclavian artery, lateral: 71/0 cm/s, triphasic, 7.9 mm     Axillary artery: 69/0 cm/s, triphasic, 8.1 mm                                                                      IMPRESSION: Patent bilateral subclavian and axillary arteries with  measurements as in the report.    Carotid US 9/1/2023  RIGHT ICA: Less than 50% diameter narrowing by grayscale imaging  and sonographic velocity criteria.     LEFT ICA:  Less than 50% diameter narrowing by grayscale imaging  and sonographic velocity criteria

## 2023-09-07 NOTE — PLAN OF CARE
Speech Language Therapy Discharge Summary    Reason for therapy discharge:    All goals and outcomes met, no further needs identified.    Progress towards therapy goal(s). See goals on Care Plan in [facility] electronic health record for goal details.  Goals met    Therapy recommendation(s):    No further therapy is recommended.    Recommend regular textures and thin liquids. Pt should be fully upright and alert for all PO, take small sips/bites, slow pacing, and alternate between consistencies. Swallowing goals met. Will complete orders.

## 2023-09-07 NOTE — PLAN OF CARE
Hours of Care:  0230 - 0700    Temp:  [97.3  F (36.3  C)-98.5  F (36.9  C)] 98  F (36.7  C)  Pulse:  [69-98] 74  Resp:  [14-32] 23  BP: (115-168)/() 136/77  SpO2:  [95 %-100 %] 98 %     D: Jamilah Jiménez is a 47-year-old female with HFrEF, Class III, Stage C-D, DM2, HTN, PE admitted following RHC with CI 1.61. Admitted for further evaluation and consideration for advanced heart failure therapies.     I: Monitored vitals and assessed pt status.     Changed: Transferred from ICU overnight  PRN: Oxycodone - requests all PRNs to be administered and to be woken for meds overnight  Tele: Sinus rhythm  O2: Room air  Mobility: Independent    A: Neuro: A/O x4.  Call light appropriate.  Able to make needs known.  Respiratory:  On room air.  Lung sounds clear.  Denies shortness of breath at rest.  Cardiac: VSS.  ICD VVI 40.  Receives heparin subcutaneous Q8H.  No s/s of bleeding.  GI: Last BM 9/6.  No report of nausea or vomiting.  : Urinating adequate amounts of clear, yellow urine  Endo:  Blood sugars ACHS.  Last   Skin:  See PCS for assessment and treatment of wounds and surgical incisions.  LDA:  20 G PIV Rt hand  Electrolytes: RN managed Mg, K, P.  AM labs pending.  Pain: Reports  8/10 headache.  Request all PRN oxycodone brought when available including overnight.  Isolation:  Standard Precautions  Tests/procedures: None performed overnight.  Diet: 2 g Na.  2000 mL fluid restriction  Sleep:  No sleep disturbances noted or reported.    P: Continue to monitor Pt status and report changes to Cards 2.  Plan to continue with advanced hf therapy workup.      Intake/Output Summary (Last 24 hours) at 9/7/2023 0540  Last data filed at 9/7/2023 0400  Gross per 24 hour   Intake 1665 ml   Output 2050 ml   Net -385 ml

## 2023-09-07 NOTE — CONSULTS
47 year old female presenting with cardiogenic shock noted during [facility] heart cath procedure.. CORE consult requested. Patient is currently admitted with HF.    Jamilah was provided with a CHF book.  I reviewed the importance of daily weights, 2 gm sodium diet, 2L fluid restriction and compliance with medications upon hospital discharge.  CORE contact phone numbers provided and patient is encouraged to call with any questions or concerns, including any weight gain or loss of 2 or more pounds in 24 hours or 5 or more pounds in 1 week.      Patient will follow up locally after discharge and see Dr. Saucedo in [place].     Jim Aguiar RN  Cardiology Care Coordinator - [facility]  Questions and [phone]

## 2023-09-08 ENCOUNTER — DOCUMENTATION ONLY (OUTPATIENT)
Dept: OTHER | Facility: CLINIC | Age: 48
End: 2023-09-08
Payer: MEDICAID

## 2023-09-08 LAB
ANION GAP SERPL CALCULATED.3IONS-SCNC: 10 MMOL/L (ref 7–15)
BUN SERPL-MCNC: 10.3 MG/DL (ref 6–20)
CALCIUM SERPL-MCNC: 8.6 MG/DL (ref 8.6–10)
CHLORIDE SERPL-SCNC: 105 MMOL/L (ref 98–107)
CREAT SERPL-MCNC: 1.04 MG/DL (ref 0.51–0.95)
DEPRECATED HCO3 PLAS-SCNC: 23 MMOL/L (ref 22–29)
EGFRCR SERPLBLD CKD-EPI 2021: 67 ML/MIN/1.73M2
GLUCOSE BLDC GLUCOMTR-MCNC: 111 MG/DL (ref 70–99)
GLUCOSE BLDC GLUCOMTR-MCNC: 117 MG/DL (ref 70–99)
GLUCOSE BLDC GLUCOMTR-MCNC: 154 MG/DL (ref 70–99)
GLUCOSE BLDC GLUCOMTR-MCNC: 198 MG/DL (ref 70–99)
GLUCOSE SERPL-MCNC: 112 MG/DL (ref 70–99)
HOLD SPECIMEN: NORMAL
MAGNESIUM SERPL-MCNC: 2.2 MG/DL (ref 1.7–2.3)
PHOSPHATE SERPL-MCNC: 2.8 MG/DL (ref 2.5–4.5)
POTASSIUM SERPL-SCNC: 4.1 MMOL/L (ref 3.4–5.3)
SA 1 CELL: NORMAL
SA 1 TEST METHOD: NORMAL
SA 2 CELL: NORMAL
SA 2 TEST METHOD: NORMAL
SA1 HI RISK ABY: NORMAL
SA1 MOD RISK ABY: NORMAL
SA2 HI RISK ABY: NORMAL
SA2 MOD RISK ABY: NORMAL
SODIUM SERPL-SCNC: 138 MMOL/L (ref 136–145)
ZZZSA 1  COMMENTS: NORMAL
ZZZSA 2 COMMENTS: NORMAL

## 2023-09-08 PROCEDURE — 120N000003 HC R&B IMCU UMMC

## 2023-09-08 PROCEDURE — 250N000011 HC RX IP 250 OP 636: Performed by: NURSE PRACTITIONER

## 2023-09-08 PROCEDURE — 86833 HLA CLASS II HIGH DEFIN QUAL: CPT | Performed by: INTERNAL MEDICINE

## 2023-09-08 PROCEDURE — 36415 COLL VENOUS BLD VENIPUNCTURE: CPT

## 2023-09-08 PROCEDURE — 84999 UNLISTED CHEMISTRY PROCEDURE: CPT | Performed by: INTERNAL MEDICINE

## 2023-09-08 PROCEDURE — 86832 HLA CLASS I HIGH DEFIN QUAL: CPT | Performed by: INTERNAL MEDICINE

## 2023-09-08 PROCEDURE — 36415 COLL VENOUS BLD VENIPUNCTURE: CPT | Performed by: INTERNAL MEDICINE

## 2023-09-08 PROCEDURE — 94618 PULMONARY STRESS TESTING: CPT | Performed by: PHYSICAL THERAPIST

## 2023-09-08 PROCEDURE — 99222 1ST HOSP IP/OBS MODERATE 55: CPT | Mod: GC | Performed by: INTERNAL MEDICINE

## 2023-09-08 PROCEDURE — 80048 BASIC METABOLIC PNL TOTAL CA: CPT

## 2023-09-08 PROCEDURE — 250N000013 HC RX MED GY IP 250 OP 250 PS 637: Performed by: INTERNAL MEDICINE

## 2023-09-08 PROCEDURE — 84100 ASSAY OF PHOSPHORUS: CPT | Performed by: PHYSICIAN ASSISTANT

## 2023-09-08 PROCEDURE — 83735 ASSAY OF MAGNESIUM: CPT | Performed by: PHYSICIAN ASSISTANT

## 2023-09-08 PROCEDURE — 250N000012 HC RX MED GY IP 250 OP 636 PS 637: Performed by: NURSE PRACTITIONER

## 2023-09-08 PROCEDURE — 99233 SBSQ HOSP IP/OBS HIGH 50: CPT | Mod: GC | Performed by: INTERNAL MEDICINE

## 2023-09-08 PROCEDURE — 250N000013 HC RX MED GY IP 250 OP 250 PS 637: Performed by: STUDENT IN AN ORGANIZED HEALTH CARE EDUCATION/TRAINING PROGRAM

## 2023-09-08 PROCEDURE — 250N000013 HC RX MED GY IP 250 OP 250 PS 637

## 2023-09-08 RX ADMIN — TRAZODONE HYDROCHLORIDE 50 MG: 50 TABLET ORAL at 21:37

## 2023-09-08 RX ADMIN — METOPROLOL SUCCINATE 25 MG: 25 TABLET, EXTENDED RELEASE ORAL at 08:56

## 2023-09-08 RX ADMIN — ACETAMINOPHEN 650 MG: 325 TABLET, FILM COATED ORAL at 17:26

## 2023-09-08 RX ADMIN — DAPAGLIFLOZIN 10 MG: 10 TABLET, FILM COATED ORAL at 08:55

## 2023-09-08 RX ADMIN — OXYCODONE HYDROCHLORIDE 5 MG: 5 TABLET ORAL at 05:18

## 2023-09-08 RX ADMIN — HEPARIN SODIUM 5000 UNITS: 5000 INJECTION, SOLUTION INTRAVENOUS; SUBCUTANEOUS at 16:15

## 2023-09-08 RX ADMIN — OXYCODONE HYDROCHLORIDE 5 MG: 5 TABLET ORAL at 11:47

## 2023-09-08 RX ADMIN — SACUBITRIL AND VALSARTAN 1 TABLET: 49; 51 TABLET, FILM COATED ORAL at 19:52

## 2023-09-08 RX ADMIN — INSULIN DETEMIR 40 UNITS: 100 INJECTION, SOLUTION SUBCUTANEOUS at 22:00

## 2023-09-08 RX ADMIN — ACETAMINOPHEN 650 MG: 325 TABLET, FILM COATED ORAL at 21:30

## 2023-09-08 RX ADMIN — HEPARIN SODIUM 5000 UNITS: 5000 INJECTION, SOLUTION INTRAVENOUS; SUBCUTANEOUS at 23:28

## 2023-09-08 RX ADMIN — OXYCODONE HYDROCHLORIDE 5 MG: 5 TABLET ORAL at 23:28

## 2023-09-08 RX ADMIN — ACETAMINOPHEN 650 MG: 325 TABLET, FILM COATED ORAL at 09:06

## 2023-09-08 RX ADMIN — SERTRALINE HYDROCHLORIDE 150 MG: 100 TABLET ORAL at 08:56

## 2023-09-08 RX ADMIN — OXYCODONE HYDROCHLORIDE 5 MG: 5 TABLET ORAL at 17:26

## 2023-09-08 RX ADMIN — HYDROXYZINE HYDROCHLORIDE 25 MG: 25 TABLET, FILM COATED ORAL at 10:14

## 2023-09-08 RX ADMIN — ATORVASTATIN CALCIUM 80 MG: 80 TABLET, FILM COATED ORAL at 19:52

## 2023-09-08 RX ADMIN — SACUBITRIL AND VALSARTAN 1 TABLET: 49; 51 TABLET, FILM COATED ORAL at 08:55

## 2023-09-08 RX ADMIN — HYDROXYZINE HYDROCHLORIDE 25 MG: 25 TABLET, FILM COATED ORAL at 19:54

## 2023-09-08 RX ADMIN — SPIRONOLACTONE 25 MG: 25 TABLET ORAL at 08:55

## 2023-09-08 RX ADMIN — HEPARIN SODIUM 5000 UNITS: 5000 INJECTION, SOLUTION INTRAVENOUS; SUBCUTANEOUS at 08:56

## 2023-09-08 ASSESSMENT — ACTIVITIES OF DAILY LIVING (ADL)
ADLS_ACUITY_SCORE: 20

## 2023-09-08 NOTE — PROGRESS NOTES
End of shift note 7a-7p:     Events:   -Pt NOT having a mammogram as part of transplant workup today, after calling radiology, this mammogram needs to be done out of the building and will need to be rescheduled; Possible on Tuesday?   -PRA single Antigen IgG antibody ordered      Neuro: A&Ox4, PERRLA, neuro intact, reports new headaches after having a stroke.     Cardiac: SB-SR with 1st AVB with HR 57-80. VSS, afebrile. + pulses & no edema.      Respiratory: Sating 98%-100% on RA, LS-CTA.    GI/: Adequate urine output, brett/clear and getting up to the BR. BM X1 (formed/brown)    Diet/appetite: Tolerating low fiber diet and eating well. ACHS gluc checks: 117, 111, 154    Activity:  Independent up to chair and in halls    Pain: At acceptable level on current regimen; Using Tylenol and Oxycodone for pain     Skin: Scattered bruising    LDA's: PIV x 1    Plan: Continue with POC. Notify primary team with changes.    Colonoscopy to be scheduled for Monday.   Pt has a dentist appointment on Tuesday @ 0930

## 2023-09-08 NOTE — PROGRESS NOTES
Neuro: A&Ox4.   Cardiac: SR/SB. VSS.   Respiratory: Sating high 90s on RA.  GI/: Adequate urine output. No BM noted this shift.  Diet/appetite: Tolerating low fiber diet. Eating well.  Activity:  Independent in room.  Pain: Pt reports 10/10 pain to head 5 mg Oxycodone PRN given twice during shift.   Skin: No new deficits noted.  LDA's: PIV to right arm  Plan: Continue with POC. Notify primary team with changes.

## 2023-09-08 NOTE — PROGRESS NOTES
MICHELLEW/Angel was asked to meet with patient to have them sign and notarize their Health Care Directive. CHW met with patient to notarize HCD.HCD form was verbally reviewed and verified with patient by CHW Pt signed and dated form and CHW notarized form. HCD was emailed to [facility] and a copy was placed in the pt's chart. The original HCD was given to the pt.     Geovani Cohen   [facility] Health Worker and Angel   [facility] 7C & 7D

## 2023-09-08 NOTE — CONSULTS
GASTROENTEROLOGY CONSULTATION    Date of Admission:  8/31/2023       ASSESSMENT AND RECOMMENDATIONS:   47 year old female with HFrEF EF 18%, diabetes type 2, HTN, PE who was admitted for heart failure and now on transplant/LVAD evaluation. GI consulted for colonoscopy for colon cancer screening as a part of the eval.    # Average colon cancer risk  # Heart failure on LVAD/heart transplant eval  # Recent ischemic stroke 9/2/23 s/p thrombolytic  No prior colonoscopy.  No FH of colon cancer, no abnormal bowel movement or blood in stool. Per cardiology note, she is now euvolumic. Discussed with neurology, safe to do the procedure given small stroke and was cardioembolic which would not affected much from the blood pressure fluctuation during colonoscopy.    RECOMMENDATIONS  - Low fiber diet now  - Clear liquid diet Jorge 9/10/23. Then NPO after midnight apart from Golytely prep.  - Golytely 4 L 5 pm Jorge 9/10/23 (ordered)  - Colonoscopy am 9/11/23    Thank you for involving us in this patient's care. Please do not hesitate to contact the GI service with any questions or concerns.     Patient care plan discussed with Dr. Borges, GI staff physician.    Kamari Simons MD  GI fellow    Attending Attestation:  I saw the patient with Dr. Simons and agree with the findings and the plan of care as documented in the fellow's note. In summary, the patient is an 47 year old female with a history of HFrEF EF 18%, diabetes type 2, HTN, PE who was admitted for heart failure and now on transplant/LVAD evaluation. GI consulted for colonoscopy for colon cancer screening as a part of the eval.    The patient was seen to discuss the possibility of screening colonoscopy as part of her transplant/LVAD evaluation. We confirmed with neurology that it is safe to proceed given an acute cardioembolic stroke that occurred on 9/2. We will plan to proceed with colonoscopy on Monday. Please ensure clear liquid diet on Sunday with  bowel prep Sunday into Monday.     50 minutes were spent on the date of the encounter performing chart review, reviewing of outside and inside available records, review of test results as well as interpretation of tests, the patient visit, documentation, and discussion with other provider(s) and/or discussion with family.     Michael Borges DO   of Medicine  Director, Esophageal Disorders Program  Division of Gastroenterology, Hepatology, and Nutrition  [facility]            Chief Complaint:   We were asked to evaluate this patient with colonoscopy for transplant eval.  History is obtained from the patient and the medical record.          History of Present Illness:   47 year old female with HFrEF EF 18%, diabetes type 2, HTN, PE who was admitted for heart failure and now on transplant/LVAD evaluation. GI consulted for colonoscopy for colon cancer screening as a part of the eval.      Patient admitted with shortness of breath, which is now improved with diuretics. Had acute stroke 9/2/23 which now has complete resolution of symptom. Denies family history of colon cancer. Denies blood in stool. No diarrhea or constipation.    Prior endoscopy:   - no prior colonoscopy          Past Medical History:   Reviewed and edited as appropriate  Past Medical History:   Diagnosis Date    Anxiety     Diabetes (H)     TYPE II    Hypertension             Past Surgical History:   Reviewed and edited as appropriate   Past Surgical History:   Procedure Laterality Date    APPENDECTOMY      INCIDENTAL WITH C SECTION    CV CARDIAC CATHERIZATION      CV RIGHT HEART CATH MEASUREMENTS RECORDED N/A 8/31/2023    Procedure: Heart Cath Right Heart Cath;  Surgeon: Alexander Stevens MD;  Location:  HEART CARDIAC CATH LAB    EP ICD      EYE SURGERY Left     DETACHED RETINA    GYN SURGERY      TUBAL LIGATION    ORTHOPEDIC SURGERY      SHOULDER SURGERY    ORTHOPEDIC SURGERY      KNEE ARTHROSCOPY             Social History:   Reviewed and edited as appropriate  Social History     Socioeconomic History    Marital status: Single     Spouse name: Not on file    Number of children: Not on file    Years of education: Not on file    Highest education level: Not on file   Occupational History    Not on file   Tobacco Use    Smoking status: Never    Smokeless tobacco: Never   Vaping Use    Vaping Use: Never used   Substance and Sexual Activity    Alcohol use: Not Currently    Drug use: Not Currently    Sexual activity: Not on file   Other Topics Concern    Not on file   Social History Narrative    Not on file     Social Determinants of Health     Financial Resource Strain: Not on file   Food Insecurity: Not on file   Transportation Needs: Not on file   Physical Activity: Not on file   Stress: Not on file   Social Connections: Not on file   Intimate Partner Violence: Not on file   Housing Stability: Not on file            Family History:   Reviewed and edited as appropriate  No known history of gastrointestinal/liver disease or  gastrointestinal malignancies       Allergies:   Reviewed and edited as appropriate     Allergies   Allergen Reactions    Aspirin Hives     Told nursing on 6/1/22 she breaks out in hives if she takes aspirin.    Ibuprofen Hives    Lisinopril Cough    Oxycodone-Acetaminophen Nausea and Vomiting            Medications:     Medications Prior to Admission   Medication Sig Dispense Refill Last Dose    atorvastatin (LIPITOR) 20 MG tablet Take 20 mg by mouth At Bedtime   8/30/2023 at 2200    benzonatate (TESSALON) 200 MG capsule Take 200 mg by mouth 3 times daily as needed for cough       dapagliflozin (FARXIGA) 10 MG TABS tablet Take 10 mg by mouth every morning   8/30/2023 at 0830    digoxin (LANOXIN) 125 MCG tablet Take 250 mcg by mouth daily       folic acid (FOLVITE) 1 MG tablet Take 1 mg by mouth daily   8/30/2023 at 0830    insulin aspart (NOVOLOG FLEXPEN) 100 UNIT/ML pen Inject 1-10 Units Subcutaneous 4  "times daily (with meals and nightly) SLIDING SCALE   8/30/2023 at 1800    insulin detemir (LEVEMIR PEN) 100 UNIT/ML pen Inject 30 Units Subcutaneous At Bedtime   8/30/2023 at 2200    metolazone (ZAROXOLYN) 2.5 MG tablet Take 2.5 mg by mouth daily as needed   More than a month    nitroGLYcerin (NITROSTAT) 0.4 MG sublingual tablet Place 0.4 mg under the tongue every 5 minutes as needed for chest pain For chest pain place 1 tablet under the tongue every 5 minutes for 3 doses. If symptoms persist 5 minutes after 1st dose call 911.       potassium chloride ER (KLOR-CON M) 20 MEQ CR tablet Take 60 mEq by mouth 3 times daily   8/30/2023 at 0830    sacubitril-valsartan (ENTRESTO)  MG per tablet Take 1 tablet by mouth 2 times daily   8/30/2023 at 2200    sertraline (ZOLOFT) 100 MG tablet Take 100 mg by mouth daily       spironolactone (ALDACTONE) 25 MG tablet Take 50 mg by mouth every morning   8/30/2023 at 0830    torsemide (DEMADEX) 20 MG tablet Take 100 mg by mouth 2 times daily   8/30/2023 at 2200    Vitamin D, Cholecalciferol, 10 MCG (400 UNIT) TABS Take 400 Units by mouth daily       carvedilol (COREG) 3.125 MG tablet Take 3.125 mg by mouth 2 times daily                Review of Systems:     A complete review of systems was performed and is negative except as noted in the HPI           Physical Exam:   BP 96/57 (BP Location: Right arm, Cuff Size: Adult Regular)   Pulse 54   Temp 97.8  F (36.6  C) (Oral)   Resp 14   Ht 1.702 m (5' 7\")   Wt 79.3 kg (174 lb 12.8 oz)   SpO2 98%   BMI 27.38 kg/m    Wt:   Wt Readings from Last 2 Encounters:   09/08/23 79.3 kg (174 lb 12.8 oz)      Constitutional: no acute distress  HEENT: Sclera anicteric  CV: No edema  Respiratory: Unlabored breathing  Abdomen: Non-distended, nontender, no peritoneal signs  Skin: warm, perfused  Neuro: AAO x 4         Data:   Labs and imaging below were independently reviewed and interpreted    Imaging: Reviewed.  US 9/2/23 normal.    CT CAP " 9/1/23  1. No acute or suspicious abnormalities in the chest, abdomen, or  pelvis.  2. Sub-6 mm pulmonary nodules. Optional follow-up CT of the chest in  one year, if patient is high risk per Fleischner criteria

## 2023-09-08 NOTE — PLAN OF CARE
Goal Outcome Evaluation:      Plan of Care Reviewed With: patient    Overall Patient Progress: improvingOverall Patient Progress: improving    Pt progressing towards her goals and has a stable shift with stable vitals.  Pt NOT having a mammogram as planned today as part of her transplant workup. Eating and drinking well throughout the shift.   Plan to have a colonoscopy on Monday and Dental appointment on Tuesday at 0930.       Problem: Plan of Care - These are the overarching goals to be used throughout the patient stay.    Goal: Plan of Care Review  Description: The Plan of Care Review/Shift note should be completed every shift.  The Outcome Evaluation is a brief statement about your assessment that the patient is improving, declining, or no change.  This information will be displayed automatically on your shift note.  Recent Flowsheet Documentation  Taken 9/8/2023 1450 by Adina Marinelli RN  Outcome Evaluation: Pt progressing towards her goals and has a stable shift with stable vitals.  Pt having a mamogram today as part of her workup for a transplant. Eating an drinking well throughout the shift.  Plan for pt to have a colonoscopy on Monday.  Plan of Care Reviewed With: patient  Overall Patient Progress: improving  Goal: Absence of Hospital-Acquired Illness or Injury  Intervention: Identify and Manage Fall Risk  Recent Flowsheet Documentation  Taken 9/8/2023 1230 by Adina Marinelli, ELDON  Safety Promotion/Fall Prevention:   assistive device/personal items within reach   clutter free environment maintained   nonskid shoes/slippers when out of bed   patient and family education   room near nurse's station  Taken 9/8/2023 0830 by Adina Marinelli, RN  Safety Promotion/Fall Prevention:   assistive device/personal items within reach   clutter free environment maintained   nonskid shoes/slippers when out of bed   patient and family education   room near nurse's station  Intervention: Prevent Skin Injury  Recent Flowsheet  Documentation  Taken 9/8/2023 1300 by Adina Marinelli RN  Body Position: position changed independently  Taken 9/8/2023 1230 by Adina Marinelli RN  Body Position: position changed independently  Taken 9/8/2023 0830 by Adina Marinelli RN  Body Position: position changed independently  Intervention: Prevent and Manage VTE (Venous Thromboembolism) Risk  Recent Flowsheet Documentation  Taken 9/8/2023 1230 by Adina Marinelli RN  VTE Prevention/Management: SCDs (sequential compression devices) off  Taken 9/8/2023 0830 by Adina Marinelli RN  VTE Prevention/Management: SCDs (sequential compression devices) off  Goal: Optimal Comfort and Wellbeing  Intervention: Monitor Pain and Promote Comfort  Recent Flowsheet Documentation  Taken 9/8/2023 1230 by Adina Marinelli RN  Pain Management Interventions:   medication (see MAR)   emotional support   environmental changes   repositioned   rest  Taken 9/8/2023 0906 by Adina Marinelli RN  Pain Management Interventions:   medication (see MAR)   emotional support   environmental changes   repositioned   rest  Intervention: Provide Person-Centered Care  Recent Flowsheet Documentation  Taken 9/8/2023 1230 by Adina Marinelli RN  Trust Relationship/Rapport:   care explained   choices provided   emotional support provided   empathic listening provided   questions answered   questions encouraged   reassurance provided   thoughts/feelings acknowledged  Taken 9/8/2023 0830 by Adina Marinelli RN  Trust Relationship/Rapport:   care explained   choices provided   emotional support provided   empathic listening provided   questions answered   questions encouraged   reassurance provided   thoughts/feelings acknowledged     Problem: Plan of Care - These are the overarching goals to be used throughout the patient stay.    Goal: Plan of Care Review  Description: The Plan of Care Review/Shift note should be completed every shift.  The Outcome Evaluation is a brief statement about your assessment that the patient is improving,  "declining, or no change.  This information will be displayed automatically on your shift note.  Outcome: Progressing  Flowsheets (Taken 9/8/2023 1450)  Outcome Evaluation: Pt progressing towards her goals and has a stable shift with stable vitals.  Pt having a mamogram today as part of her workup for a transplant. Eating an drinking well throughout the shift.  Plan for pt to have a colonoscopy on Monday.  Plan of Care Reviewed With: patient  Overall Patient Progress: improving  Goal: Patient-Specific Goal (Individualized)  Description: You can add care plan individualizations to a care plan. Examples of Individualization might be:  \"Parent requests to be called daily at 9am for status\", \"I have a hard time hearing out of my right ear\", or \"Do not touch me to wake me up as it startles me\".  Outcome: Progressing  Goal: Absence of Hospital-Acquired Illness or Injury  Outcome: Progressing  Intervention: Identify and Manage Fall Risk  Recent Flowsheet Documentation  Taken 9/8/2023 1230 by Adina Marinelli RN  Safety Promotion/Fall Prevention:   assistive device/personal items within reach   clutter free environment maintained   nonskid shoes/slippers when out of bed   patient and family education   room near nurse's station  Taken 9/8/2023 0830 by Adina Marinelli RN  Safety Promotion/Fall Prevention:   assistive device/personal items within reach   clutter free environment maintained   nonskid shoes/slippers when out of bed   patient and family education   room near nurse's station  Intervention: Prevent Skin Injury  Recent Flowsheet Documentation  Taken 9/8/2023 1300 by Adina Marinelli RN  Body Position: position changed independently  Taken 9/8/2023 1230 by Adina Marinelli RN  Body Position: position changed independently  Taken 9/8/2023 0830 by Adina Marinelli RN  Body Position: position changed independently  Intervention: Prevent and Manage VTE (Venous Thromboembolism) Risk  Recent Flowsheet Documentation  Taken 9/8/2023 1230 by " Adina Marinelli RN  VTE Prevention/Management: SCDs (sequential compression devices) off  Taken 9/8/2023 0830 by Adina Marinelli RN  VTE Prevention/Management: SCDs (sequential compression devices) off  Goal: Optimal Comfort and Wellbeing  Outcome: Progressing  Intervention: Monitor Pain and Promote Comfort  Recent Flowsheet Documentation  Taken 9/8/2023 1230 by Adina Marinelli RN  Pain Management Interventions:   medication (see MAR)   emotional support   environmental changes   repositioned   rest  Taken 9/8/2023 0906 by Adina Marinelli RN  Pain Management Interventions:   medication (see MAR)   emotional support   environmental changes   repositioned   rest  Intervention: Provide Person-Centered Care  Recent Flowsheet Documentation  Taken 9/8/2023 1230 by Adina Marinelli RN  Trust Relationship/Rapport:   care explained   choices provided   emotional support provided   empathic listening provided   questions answered   questions encouraged   reassurance provided   thoughts/feelings acknowledged  Taken 9/8/2023 0830 by Adina Marinelli RN  Trust Relationship/Rapport:   care explained   choices provided   emotional support provided   empathic listening provided   questions answered   questions encouraged   reassurance provided   thoughts/feelings acknowledged  Goal: Readiness for Transition of Care  Outcome: Progressing     Problem: Heart Failure  Goal: Optimal Functional Ability  Intervention: Optimize Functional Ability  Recent Flowsheet Documentation  Taken 9/8/2023 1442 by Adina Marinelli RN  Self-Care Promotion: independence encouraged  Activity Management:   activity adjusted per tolerance   activity encouraged   ambulated to bathroom   up ad van  Taken 9/8/2023 1300 by Adina Marinelli RN  Activity Management:   activity adjusted per tolerance   activity encouraged   ambulated to bathroom  Taken 9/8/2023 1230 by Adina Marinelli RN  Activity Management:   activity adjusted per tolerance   activity encouraged   up ad van  Taken 9/8/2023  0830 by Adina Marinelli RN  Activity Management:   activity adjusted per tolerance   activity encouraged   up ad van  Goal: Effective Oxygenation and Ventilation  Intervention: Promote Airway Secretion Clearance  Recent Flowsheet Documentation  Taken 9/8/2023 1442 by Adina Marinelli RN  Activity Management:   activity adjusted per tolerance   activity encouraged   ambulated to bathroom   up ad van  Taken 9/8/2023 1300 by Adina Marinelli RN  Activity Management:   activity adjusted per tolerance   activity encouraged   ambulated to bathroom  Taken 9/8/2023 1230 by Adina Marinelli RN  Cough And Deep Breathing: done independently per patient  Activity Management:   activity adjusted per tolerance   activity encouraged   up ad van  Taken 9/8/2023 0830 by Adina Marinelli RN  Cough And Deep Breathing: done independently per patient  Activity Management:   activity adjusted per tolerance   activity encouraged   up ad van  Intervention: Optimize Oxygenation and Ventilation  Recent Flowsheet Documentation  Taken 9/8/2023 1442 by Adina Marinelli RN  Airway/Ventilation Management:   airway patency maintained   pulmonary hygiene promoted  Head of Bed (HOB) Positioning: HOB at 30-45 degrees  Taken 9/8/2023 1300 by Adina Marinelli RN  Head of Bed (HOB) Positioning: HOB at 30-45 degrees  Taken 9/8/2023 1230 by Adina Marinelli RN  Head of Bed (HOB) Positioning: HOB at 30-45 degrees  Taken 9/8/2023 0830 by Adina Marinelli RN  Head of Bed (HOB) Positioning: HOB at 30-45 degrees  Goal: Effective Breathing Pattern During Sleep  Intervention: Monitor and Manage Obstructive Sleep Apnea  Recent Flowsheet Documentation  Taken 9/8/2023 1230 by Adina Marinelli RN  Medication Review/Management: medications reviewed  Taken 9/8/2023 0830 by Adina Marinelli RN  Medication Review/Management: medications reviewed     Problem: Heart Failure  Goal: Optimal Cardiac Output  Outcome: Progressing  Goal: Stable Heart Rate and Rhythm  Outcome: Progressing  Goal: Fluid and Electrolyte  Balance  Outcome: Progressing     Problem: Risk for Delirium  Goal: Improved Behavioral Control  Intervention: Prevent and Manage Agitation  Recent Flowsheet Documentation  Taken 9/8/2023 1442 by Adina Marinelli RN  Environment Familiarity/Consistency:   daily routine followed   familiar objects from home provided   personal clothing/items utilized  Intervention: Minimize Safety Risk  Recent Flowsheet Documentation  Taken 9/8/2023 1230 by Adina Marinelli RN  Enhanced Safety Measures: room near unit station  Trust Relationship/Rapport:   care explained   choices provided   emotional support provided   empathic listening provided   questions answered   questions encouraged   reassurance provided   thoughts/feelings acknowledged  Taken 9/8/2023 0830 by Adina Marinelli RN  Enhanced Safety Measures: room near unit station  Trust Relationship/Rapport:   care explained   choices provided   emotional support provided   empathic listening provided   questions answered   questions encouraged   reassurance provided   thoughts/feelings acknowledged  Goal: Improved Attention and Thought Clarity  Intervention: Maximize Cognitive Function  Recent Flowsheet Documentation  Taken 9/8/2023 1442 by Adina Marinelli RN  Sensory Stimulation Regulation:   care clustered   quiet environment promoted   tactile stimulation provided   visitors limited  Reorientation Measures:   calendar in view   clock in view   familiar social contact encouraged     Problem: Pain Acute  Goal: Optimal Pain Control and Function  Outcome: Progressing  Intervention: Develop Pain Management Plan  Recent Flowsheet Documentation  Taken 9/8/2023 1230 by Adina Marinelli RN  Pain Management Interventions:   medication (see MAR)   emotional support   environmental changes   repositioned   rest  Taken 9/8/2023 0906 by Adina Marinelli RN  Pain Management Interventions:   medication (see MAR)   emotional support   environmental changes   repositioned   rest  Intervention: Prevent or Manage  Pain  Recent Flowsheet Documentation  Taken 9/8/2023 1442 by Adina Marinelli RN  Sensory Stimulation Regulation:   care clustered   quiet environment promoted   tactile stimulation provided   visitors limited  Taken 9/8/2023 1230 by Adina Marinelli RN  Medication Review/Management: medications reviewed  Taken 9/8/2023 0830 by Adina Marinelli RN  Medication Review/Management: medications reviewed     Problem: Stroke, Ischemic (Includes Transient Ischemic Attack)  Goal: Optimal Coping  Outcome: Met  Goal: Effective Bowel Elimination  Outcome: Met  Intervention: Promote Effective Bowel Elimination  Flowsheets (Taken 9/8/2023 1442)  Bowel Elimination Management: (Pt having BM and on bowel meds.) --  Bowel Program: (Pt having BM and on bowel meds.) maintenance program followed  Goal: Optimal Cerebral Tissue Perfusion  9/8/2023 1450 by Adina Marinelli RN  Outcome: Progressing  9/8/2023 1442 by Adina Marinelli RN  Outcome: Progressing  Intervention: Protect and Optimize Cerebral Perfusion  Flowsheets (Taken 9/8/2023 1442)  Sensory Stimulation Regulation:   care clustered   quiet environment promoted   tactile stimulation provided   visitors limited  Cerebral Perfusion Promotion:   blood pressure monitored   normothermia promoted  Goal: Optimal Cognitive Function  9/8/2023 1450 by Adina Marinelli RN  Outcome: Progressing  9/8/2023 1442 by Adina Marinelli RN  Outcome: Progressing  Intervention: Optimize Cognitive Function  Flowsheets (Taken 9/8/2023 1442)  Sensory Stimulation Regulation:   care clustered   quiet environment promoted   tactile stimulation provided   visitors limited  Reorientation Measures:   calendar in view   clock in view   familiar social contact encouraged  Environment Familiarity/Consistency:   daily routine followed   familiar objects from home provided   personal clothing/items utilized  Goal: Improved Communication Skills  Outcome: Met  Intervention: Optimize Communication Skills  Flowsheets (Taken 9/8/2023  1442)  Communication Enhancement Strategies:   call light answered in person   family involved in communication plan  Goal: Optimal Functional Ability  Outcome: Met  Intervention: Optimize Functional Ability  Flowsheets  Taken 9/8/2023 1442  Self-Care Promotion: independence encouraged  Activity Management:   activity adjusted per tolerance   activity encouraged   ambulated to bathroom   up ad van  Taken 9/8/2023 1300  Activity Management:   activity adjusted per tolerance   activity encouraged   ambulated to bathroom  Taken 9/8/2023 1230  Activity Management:   activity adjusted per tolerance   activity encouraged   up ad van  Taken 9/8/2023 0830  Activity Management:   activity adjusted per tolerance   activity encouraged   up ad van  Goal: Effective Oxygenation and Ventilation  Outcome: Met  Intervention: Optimize Oxygenation and Ventilation  Flowsheets  Taken 9/8/2023 1442  Airway/Ventilation Management:   airway patency maintained   pulmonary hygiene promoted  Head of Bed (HOB) Positioning: HOB at 30-45 degrees  Taken 9/8/2023 1300  Head of Bed (HOB) Positioning: HOB at 30-45 degrees  Taken 9/8/2023 1230  Head of Bed (HOB) Positioning: HOB at 30-45 degrees  Taken 9/8/2023 0830  Head of Bed (HOB) Positioning: HOB at 30-45 degrees  Goal: Improved Sensorimotor Function  Outcome: Met  Intervention: Optimize Range of Motion, Motor Control and Function  Flowsheets  Taken 9/8/2023 1442  Range of Motion: active ROM (range of motion) encouraged  Positioning/Transfer Devices:   pillows   in use  Taken 9/8/2023 1300  Positioning/Transfer Devices:   pillows   in use  Taken 9/8/2023 1230  Positioning/Transfer Devices:   pillows   in use  Taken 9/8/2023 0830  Positioning/Transfer Devices:   pillows   in use  Intervention: Optimize Sensory and Perceptual Ability  Flowsheets (Taken 9/8/2023 1442)  Pressure Reduction Techniques: (Pt able to move in bed independently with no sensory/preception problems.) other (see  comments)  Sensation Impairment Protection: (Pt able to move in bed independently with no sensory/preception problems.) other (see comments)  Pressure Reduction Devices: (Pt able to move in bed independently with no sensory/preception problems.) other (see comments)  Goal: Optimal Eating and Swallowing without Aspiration  Outcome: Met  Intervention: Optimize Eating and Swallowing  Flowsheets (Taken 9/8/2023 1442)  Aspiration Precautions:   awake/alert before oral intake   oral hygiene care promoted  Swallowing Interventions: Dysphagia: (NA, pt not having problems swallowing.) other (see comments)  Goal: Effective Urinary Elimination  Outcome: Met  Intervention: Promote Effective Bladder Elimination  Flowsheets (Taken 9/8/2023 1442)  Urinary Elimination Promotion: (NA, pt not having problems voiding.) other (see comments)

## 2023-09-08 NOTE — PLAN OF CARE
Goal Outcome Evaluation:           Problem: Plan of Care - These are the overarching goals to be used throughout the patient stay.    Goal: Absence of Hospital-Acquired Illness or Injury  Intervention: Identify and Manage Fall Risk  Recent Flowsheet Documentation  Taken 9/8/2023 1230 by Adina Marinelli RN  Safety Promotion/Fall Prevention:   assistive device/personal items within reach   clutter free environment maintained   nonskid shoes/slippers when out of bed   patient and family education   room near nurse's station  Taken 9/8/2023 0830 by Adina Marinelli RN  Safety Promotion/Fall Prevention:   assistive device/personal items within reach   clutter free environment maintained   nonskid shoes/slippers when out of bed   patient and family education   room near nurse's station  Intervention: Prevent Skin Injury  Recent Flowsheet Documentation  Taken 9/8/2023 1300 by Adina Marinelli RN  Body Position: position changed independently  Taken 9/8/2023 1230 by Adina Marinelli RN  Body Position: position changed independently  Taken 9/8/2023 0830 by Adina Marinelli RN  Body Position: position changed independently  Intervention: Prevent and Manage VTE (Venous Thromboembolism) Risk  Recent Flowsheet Documentation  Taken 9/8/2023 1230 by Adina Marinelli RN  VTE Prevention/Management: SCDs (sequential compression devices) off  Taken 9/8/2023 0830 by Adina Marinelli RN  VTE Prevention/Management: SCDs (sequential compression devices) off  Goal: Optimal Comfort and Wellbeing  Intervention: Monitor Pain and Promote Comfort  Recent Flowsheet Documentation  Taken 9/8/2023 1230 by Adina Marinelli RN  Pain Management Interventions:   medication (see MAR)   emotional support   environmental changes   repositioned   rest  Taken 9/8/2023 0906 by Adina Marinelli RN  Pain Management Interventions:   medication (see MAR)   emotional support   environmental changes   repositioned   rest  Intervention: Provide Person-Centered Care  Recent Flowsheet  Documentation  Taken 9/8/2023 1230 by Adina Marinelli, RN  Trust Relationship/Rapport:   care explained   choices provided   emotional support provided   empathic listening provided   questions answered   questions encouraged   reassurance provided   thoughts/feelings acknowledged  Taken 9/8/2023 0830 by Adina Marinelli, RN  Trust Relationship/Rapport:   care explained   choices provided   emotional support provided   empathic listening provided   questions answered   questions encouraged   reassurance provided   thoughts/feelings acknowledged

## 2023-09-08 NOTE — PROGRESS NOTES
[facility]   Cardiology II Service / Advanced Heart Failure  Daily Progress Note  Date of Service: 9/1/2023      Patient: Jamilah Jiménez  MRN: [id number]  Admission Date: 8/31/2023  Hospital Day # 8    Assessment and Plan:  Jamilah Jiménez is a 47-year-old female with HFrEF, Class III, Stage C-D, DM2, HTN, PE admitted following RHC with CI 1.61. Admitted for further evaluation and consideration for advanced heart failure therapies.     9/8:  - Hypovolemic. Continue to hold diuretics  - Palliative consult pending. LVAD teaching not documented yet. Palliative waiting for that prior to meeting with patient.   - Mammogram today   - GI consult to schedule colonoscopy   - 9/11 dental appointment at 2pm.        #HFrEF (LVEF 18%), non-ischemic cardiomyopathy (genetic) s/p ICD  #HTN   #HLD   ACC/AHA Stage D, NYHA Symptom Class IV  Patient with progressively worsening dyspnea, edema, overall functional status over the last ~ 2 years. 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 Has been on diuretics, GDMT, has ICD in place.     Primary Cardiologist: Dr. Saucedo Last seen 12/06/2022   Ischemic Eval: Coronary angiogram 12/24/2020 with no significant coronary artery disease, elevated LVEDP 24 mmHg   RHC 08/31/2023: RA 9; PA 41/22/30; Amina CO/CI 3.04/1.61   CMR 08/31/2023: Severely dilated LV with global systolic function severely reduced, LVEF 18%, severe diffuse hypokinesis, RV with normal cavity size and normal global systolic function, RVEF 53%  Cardiopulmonary Stress Test 09/01: Peak VO2 12.70 ml/kg/min; VE/VCO2 59.11; RER 0.99      Volume: Euvolemic   Diuretic: Torsemide 100 mg BID (held since 09/03/23, ION)  ACE-I/ARB/ARNi: Entresto 49/51 BID   BB: Metoprolol XL 25 mg (09/07/23- present)  Aldosterone antagonist: Spironolactone 25 mg (09/07/23)  SGLT2i: Dapagliflozin 10 mg   Hydral/Nitrates: hydralazine 25 TID held since 09/06/23   SCD prophylaxis: ICD 11/2022    Statin: Atorvastatin 80 mg        DATE MAP CVP PAP PCWP Amina CO Amina CI SVR MVo2 Therapies Weight    08/31/2023   9 41/22/30 19 3.04 1.61       172 lbs    10/2022   12 76/35/50 38 3.25 1.72       169 lbs      LVAD/Transplant Evaluation Checklist  [x] Labs (CBC, CMP, PT/INR, cystatin C, prealbumin, UA + micro) Cystatin 1.6  [x] Infectious (Hep A/B/C, HIV, treponema, HSV 1/2 IgG, CMV IgG, Toxo IgG, EBV IgG, varicella IgG, Quant gold, COVID vaccine/PCR)  [x] Utox/nicotine and cotinine/PeTH   [x] Immunocompatibility (last transfusion, ABO, HLA tissue typing, PRA)  [x] ECG, Echo - 08/31/2023: Severely dilated LV with global systolic function severely reduced, LVEF 18%, severe diffuse hypokinesis, RV with normal cavity size and normal global systolic function, RVEF 53%  [x] CPX 9/1/2023- Peak VO2 12.70 ml/kg/min; VE/VCO2 59.11; RER 0.99   [] 6MWT   [x] RHC - 8/31/2023: RA 9; PA 41/22/30; Amina CO/CI 3.04/1.61   [x] CVTS consult  [x] Social work consult- conditional candidate for advanced therapies. In urgent situation, writer would favor LVAD. Concerns with heart transplant due to fairly recent meth use.   [] Palliative care consult   [x] Neuropsych consult  [x] Nutrition consult  [x] CT Dental   [x] Dental consult  [x] Abd US + doppler  [x] Extremity US and ABIs  [x] Carotid US (if DM or ICM or >49yo)  [x] PFTs  [] Dexa - may be ordered outpatient   [x] CT head non-contrast  [x] CT CAP non-contrast -  Sub-6 mm pulmonary nodules. Optional follow-up CT of the chest in  one year, if patient is high risk per Fleischner criteria  [] colonoscopy (>49 yo) - ordered GI consult   [x] HCG  [x] mammogram (>41 yo) 9/8  [] Pap test       #Nausea - resolved   #Aphasia - improved   #Right-sided numbness and weakness - improved   On 09/02, she developed acute-onset expressive aphasia and right-sided weakness/numbness, CTH was negative for acute bleeding, but did show an area of hypodensity in the left parietal lobe consistent with old  stroke. CTA was negative for LVO. Given a clinical syndrome consistent with acute ischemic stroke localizing to the left frontal and parietal lobes, and an absence of contraindications to thrombolytic therapy, tenecteplase (TNK) was administered. She was transferred to the neurocritical care unit for further management. Similar episode 09/03 where she was unable to articulate her name or date of birth with slow and slurred speech and word finding difficulty. Throughout the episode, was noted to have purposeful, strong, equal strength bilaterally. Repeat Head CT without evidence of intracranial hemorrhage, stable appearance of patchy hypoattenuation of the left parietal periventricular matter.  Seen by psych 09/04/23 noted to have some component of conversion disorder.   - seen by neurology and psychiatry  - MRI scan 09/05/23 will follow up   - Increased sertraline to 150 mg, trazodone 50 mg at bedtime      #Type 2 DM   Most recent Hgb A1c 7.8 on 08/10/2023  - Detemir 30 U at bedtime  - MSSI       #Sub-6cm pulmonary nodules, incidental finding on CT chest 9/2023   - Optional follow-up CT of the chest in one year, if patient is high risk per Fleischner criteria      Tao Davis MD  [facility]  Department of Internal Medicine   Resident Physician  [id number]  Pager: [phone]     ================================================================    Interval History/ROS:   NAEON. Patient endorses fluent speech that has returned since her code strokes. She also reports that she has less SOB and abdomenal distension to when she arrived at the hospital.    1.2L in, 2.8L out with net negative 1.6L yesterday. Weight 172 today, 177 lbs yesterday.        Medications: Reviewed in EPIC.     Physical Exam:   Temp:  [97.7  F (36.5  C)-98.8  F (37.1  C)] 97.8  F (36.6  C)  Pulse:  [54-76] 54  Resp:  [12-22] 14  BP: ()/(53-88) 96/57  SpO2:  [97 %-100 %] 98 %      GEN: No acute distress   HEENT: EOMI, no icterus,  moist mucous membranes   CV: RRR, normal s1/s2, no murmurs. JVP ~8cm   CHEST: Normal work of breathing. Lungs CTAB, no wheezes or crackles.   ABD: Soft, Non-tender.  Abdomenal distension present but less than prior days.   EXT: Warm and well-perfused, trace edema  NEURO: Awake, alert, answering questions appropriately, motor grossly nonfocal  PSYCH: cooperative, affect appropriate    Data:  Lab Results   Component Value Date    WBC 3.5 (L) 09/07/2023    HGB 11.2 (L) 09/07/2023    HCT 32.8 (L) 09/07/2023     09/07/2023     09/08/2023    POTASSIUM 4.1 09/08/2023    CHLORIDE 105 09/08/2023    CO2 23 09/08/2023    BUN 10.3 09/08/2023    CR 1.04 (H) 09/08/2023     (H) 09/08/2023    NTBNPI 1,425 (H) 08/31/2023    AST 23 09/02/2023    ALT 13 09/02/2023    ALKPHOS 93 09/02/2023    BILITOTAL 1.0 09/02/2023    INR 1.15 09/03/2023                Lab Results   Component Value Date     09/05/2023    Lab Results   Component Value Date    CHLORIDE 99 09/05/2023    Lab Results   Component Value Date    BUN 38.1 09/05/2023      Lab Results   Component Value Date    POTASSIUM 3.7 09/05/2023    Lab Results   Component Value Date    CO2 23 09/05/2023    Lab Results   Component Value Date    CR 1.46 09/05/2023        Lab Results   Component Value Date    WBC 3.5 (L) 09/07/2023    HGB 11.2 (L) 09/07/2023    HCT 32.8 (L) 09/07/2023    MCV 84 09/07/2023     09/07/2023         Imaging:    CT Head w/o Contrast (09/03/2023)  MPRESSION:   1. No CT evidence of intracranial hemorrhage.  2. Stable appearance of patchy hypoattenuation of the left parietal  periventricular white matter.     CT Head w/o Contrast (09/02/2023)  Impression:   1. No evidence of intracranial hemorrhage.  2. Head CTA demonstrates no aneurysm or stenosis of the major  intracranial arteries.   3. Neck CTA demonstrates no stenosis of the major cervical arteries.  Mild atherosclerotic changes of bilateral carotid bifurcations.  4. Mild patchy  low attenuation within the left parietal white matter;   stable from yesterday's study; this might represent old infarct or  gliotic changes. However may consider MRI to exclude acute infarct  given the limitations of C    CTA Head Neck (09/02/2023)  Impression:   1. No evidence of intracranial hemorrhage.  2. Head CTA demonstrates no aneurysm or stenosis of the major  intracranial arteries.   3. Neck CTA demonstrates no stenosis of the major cervical arteries.  Mild atherosclerotic changes of bilateral carotid bifurcations.  4. Mild patchy low attenuation within the left parietal white matter;   stable from yesterday's study; this might represent old infarct or  gliotic changes. However may consider MRI to exclude acute infarct  given the limitations of CT.     Chest CT 9/1/2023  IMPRESSION:   1. No acute or suspicious abnormalities in the chest, abdomen, or  pelvis.  2. Sub-6 mm pulmonary nodules. Optional follow-up CT of the chest in  one year, if patient is high risk per Fleischner criteria    CT Head w/o contrast 9/1/2023  No suspicious intracranial finding. Subtle patchy  hypodensities in the left parietal subcortical white matter, likely  related to gliosis from underlying chronic small vessel disease.    CT Dental wo contrast 9/1/2023  Multifocal scattered dental caries and dental  reconstruction. Most notably there is a large cavity in the left  maxillary first premolar tooth, associated with periapical lucency and  dehiscence of the adjacent outer maxillary cortical bone, compatible  with periapical periodontitis.    CXR 9/1/2023  ICD in place.    US BI Doppler 9/1/2023  1. RIGHT:       A. Resting MARLENE is normal, 1.18.     2. LEFT:       A. Resting MARLENE is normal, 1.11.    Upper Extremity US 9/1/2023  RIGHT:  Subclavian artery, medial: 64/0 cm/s, triphasic, 10.6 mm  Subclavian artery, mid: 62/0 cm/s, triphasic, 8.7 mm  Subclavian artery, lateral: 62/0 cm/s, triphasic, 9.1 mm     Axillary artery: 56/0 cm/s,  triphasic, 8.2 mm     LEFT:  Subclavian artery, medial: 76/0 cm/s, triphasic, 8.5 mm  Subclavian artery, mid: 105/0 cm/s, triphasic, 8.6 mm  Subclavian artery, lateral: 71/0 cm/s, triphasic, 7.9 mm     Axillary artery: 69/0 cm/s, triphasic, 8.1 mm                                                                      IMPRESSION: Patent bilateral subclavian and axillary arteries with  measurements as in the report.    Carotid US 9/1/2023  RIGHT ICA: Less than 50% diameter narrowing by grayscale imaging  and sonographic velocity criteria.     LEFT ICA:  Less than 50% diameter narrowing by grayscale imaging  and sonographic velocity criteria

## 2023-09-08 NOTE — PROGRESS NOTES
"SIX MINUTE WALK TEST  Physical Therapy  9/8/2023    Jamilah Jiménez MRN# [id number]   YOB: 1975 Age: 47 year old     Height: 5' 7\"  Weight (lbs): 174 lbs 12.8 oz Weight (kg): 79.3 kg (actual weight)    Supplemental oxygen during the test: No,     Oxygen Appliance: None    Oximetry: Finger Probe    Gait Aid: None     Pre-test Post-test   Time 0920 0930   Blood Pressure (mm Hg) 114/72 117/74    54 68   Rated Perceived Dyspnea (Tc Scale) 0 -- Nothing at all  4 -- Somewhat severe    Rated Perceived Exertion (Tc Scale) 0 -- (Nothing at all) 7 -- Very Strong    SpO2 (%) 98 98     Total distance walked in 6 minutes: 1066 feet, 355 meters    Paused during test?No    Stopped test before 6 minutes? No    Did the patient experience any pain or discomfort during the test? No    Oxygen Titration Required: No    Performing Staff: Michael Glez, PT       "

## 2023-09-08 NOTE — PROGRESS NOTES
Care Management Follow Up    Length of Stay (days): 8    Expected Discharge Date: 09/08/2023     Concerns to be Addressed:  HCD    Patient plan of care discussed at interdisciplinary rounds: Yes    Anticipated Discharge Disposition:  Home      Anticipated Discharge Services:  none   Anticipated Discharge DME:  none     Patient/family educated on Medicare website which has current facility and service quality ratings:  n/a   Education Provided on the Discharge Plan:  Yes  Patient/Family in Agreement with the Plan:  Yes    Referrals Placed by CM/SW:  None   Private pay costs discussed: Not applicable    Additional Information:  Social Work follow-up as part of ongoing advanced heart failure evaluation. Pt has completed her HCD and writer will notify notary to come and notarize document. Anticipate pt will discharge over the weekend. Confirmed pt has family that will be able to provide transportation home. Confirmed her PCP is Ami Velez ([phone]) and information given to [facility]. She is aware of OPCR recommendation and prefers to arrange this independently as she has been referred previously.     Pt has writer's contact information should she have questions regarding advanced therapies upon return home.     AICHA SimmsSW
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
## 2023-09-08 NOTE — PROGRESS NOTES
D:  Pt scheduled for PVE w/ cousin at 1pm today.  Pt's cousin is driving and won't be here for a couple of hours.  Pt's cousin is leaving on Sunday.  I:  Rescheduled PVE for Monday at 11am.  Will Facetime w/ cousin for PVE.  P:  VAD Coordinator available for questions or concerns.

## 2023-09-09 LAB
ANION GAP SERPL CALCULATED.3IONS-SCNC: 7 MMOL/L (ref 7–15)
BACTERIA UR CULT: NORMAL
BUN SERPL-MCNC: 10.5 MG/DL (ref 6–20)
CALCIUM SERPL-MCNC: 8.2 MG/DL (ref 8.6–10)
CHLORIDE SERPL-SCNC: 110 MMOL/L (ref 98–107)
CREAT SERPL-MCNC: 0.99 MG/DL (ref 0.51–0.95)
DEPRECATED HCO3 PLAS-SCNC: 22 MMOL/L (ref 22–29)
EGFRCR SERPLBLD CKD-EPI 2021: 71 ML/MIN/1.73M2
GLUCOSE BLDC GLUCOMTR-MCNC: 116 MG/DL (ref 70–99)
GLUCOSE BLDC GLUCOMTR-MCNC: 151 MG/DL (ref 70–99)
GLUCOSE BLDC GLUCOMTR-MCNC: 157 MG/DL (ref 70–99)
GLUCOSE BLDC GLUCOMTR-MCNC: 219 MG/DL (ref 70–99)
GLUCOSE SERPL-MCNC: 147 MG/DL (ref 70–99)
HOLD SPECIMEN: NORMAL
MAGNESIUM SERPL-MCNC: 2 MG/DL (ref 1.7–2.3)
PHOSPHATE SERPL-MCNC: 2.8 MG/DL (ref 2.5–4.5)
POTASSIUM SERPL-SCNC: 4.1 MMOL/L (ref 3.4–5.3)
SODIUM SERPL-SCNC: 139 MMOL/L (ref 136–145)

## 2023-09-09 PROCEDURE — 250N000011 HC RX IP 250 OP 636: Performed by: NURSE PRACTITIONER

## 2023-09-09 PROCEDURE — 120N000003 HC R&B IMCU UMMC

## 2023-09-09 PROCEDURE — 250N000013 HC RX MED GY IP 250 OP 250 PS 637: Performed by: INTERNAL MEDICINE

## 2023-09-09 PROCEDURE — 36415 COLL VENOUS BLD VENIPUNCTURE: CPT

## 2023-09-09 PROCEDURE — 84132 ASSAY OF SERUM POTASSIUM: CPT

## 2023-09-09 PROCEDURE — 84100 ASSAY OF PHOSPHORUS: CPT | Performed by: INTERNAL MEDICINE

## 2023-09-09 PROCEDURE — 250N000013 HC RX MED GY IP 250 OP 250 PS 637

## 2023-09-09 PROCEDURE — 99233 SBSQ HOSP IP/OBS HIGH 50: CPT | Mod: GC | Performed by: INTERNAL MEDICINE

## 2023-09-09 PROCEDURE — 250N000013 HC RX MED GY IP 250 OP 250 PS 637: Performed by: STUDENT IN AN ORGANIZED HEALTH CARE EDUCATION/TRAINING PROGRAM

## 2023-09-09 PROCEDURE — 83735 ASSAY OF MAGNESIUM: CPT | Performed by: INTERNAL MEDICINE

## 2023-09-09 RX ORDER — MAGNESIUM OXIDE 400 MG/1
400 TABLET ORAL EVERY 4 HOURS
Status: COMPLETED | OUTPATIENT
Start: 2023-09-09 | End: 2023-09-09

## 2023-09-09 RX ORDER — ACETAMINOPHEN 500 MG
1000 TABLET ORAL 3 TIMES DAILY
Status: DISCONTINUED | OUTPATIENT
Start: 2023-09-09 | End: 2023-09-11 | Stop reason: HOSPADM

## 2023-09-09 RX ADMIN — ACETAMINOPHEN 1000 MG: 500 TABLET ORAL at 12:08

## 2023-09-09 RX ADMIN — SACUBITRIL AND VALSARTAN 1 TABLET: 49; 51 TABLET, FILM COATED ORAL at 19:51

## 2023-09-09 RX ADMIN — TRAZODONE HYDROCHLORIDE 50 MG: 50 TABLET ORAL at 21:05

## 2023-09-09 RX ADMIN — SACUBITRIL AND VALSARTAN 1 TABLET: 49; 51 TABLET, FILM COATED ORAL at 08:00

## 2023-09-09 RX ADMIN — DAPAGLIFLOZIN 10 MG: 10 TABLET, FILM COATED ORAL at 08:00

## 2023-09-09 RX ADMIN — HEPARIN SODIUM 5000 UNITS: 5000 INJECTION, SOLUTION INTRAVENOUS; SUBCUTANEOUS at 15:14

## 2023-09-09 RX ADMIN — OXYCODONE HYDROCHLORIDE 5 MG: 5 TABLET ORAL at 05:53

## 2023-09-09 RX ADMIN — ACETAMINOPHEN 650 MG: 325 TABLET, FILM COATED ORAL at 04:25

## 2023-09-09 RX ADMIN — HEPARIN SODIUM 5000 UNITS: 5000 INJECTION, SOLUTION INTRAVENOUS; SUBCUTANEOUS at 23:33

## 2023-09-09 RX ADMIN — ACETAMINOPHEN 1000 MG: 500 TABLET ORAL at 19:51

## 2023-09-09 RX ADMIN — MAGNESIUM OXIDE TAB 400 MG (241.3 MG ELEMENTAL MG) 400 MG: 400 (241.3 MG) TAB at 11:57

## 2023-09-09 RX ADMIN — METOPROLOL SUCCINATE 25 MG: 25 TABLET, EXTENDED RELEASE ORAL at 07:59

## 2023-09-09 RX ADMIN — ATORVASTATIN CALCIUM 80 MG: 80 TABLET, FILM COATED ORAL at 19:51

## 2023-09-09 RX ADMIN — MAGNESIUM OXIDE TAB 400 MG (241.3 MG ELEMENTAL MG) 400 MG: 400 (241.3 MG) TAB at 14:08

## 2023-09-09 RX ADMIN — HYDROXYZINE HYDROCHLORIDE 25 MG: 25 TABLET, FILM COATED ORAL at 21:28

## 2023-09-09 RX ADMIN — HEPARIN SODIUM 5000 UNITS: 5000 INJECTION, SOLUTION INTRAVENOUS; SUBCUTANEOUS at 08:00

## 2023-09-09 RX ADMIN — SERTRALINE HYDROCHLORIDE 150 MG: 100 TABLET ORAL at 07:59

## 2023-09-09 RX ADMIN — HYDROXYZINE HYDROCHLORIDE 25 MG: 25 TABLET, FILM COATED ORAL at 15:21

## 2023-09-09 RX ADMIN — INSULIN DETEMIR 40 UNITS: 100 INJECTION, SOLUTION SUBCUTANEOUS at 21:09

## 2023-09-09 RX ADMIN — SPIRONOLACTONE 25 MG: 25 TABLET ORAL at 08:00

## 2023-09-09 ASSESSMENT — ACTIVITIES OF DAILY LIVING (ADL)
ADLS_ACUITY_SCORE: 20

## 2023-09-09 NOTE — PROGRESS NOTES
Patient's nurse called on behalf of one of the providers inquiring about the pre VAD education and asking if it could be done this weekend. I told her that it is scheduled for 11am on Monday. I also called the [facility] team to let them know that it is scheduled. If some of the advanced heart failure evaluation tests conflict, the pre VAD education could be moved to a different time on Monday or Tuesday.

## 2023-09-09 NOTE — PROGRESS NOTES
End of shift note 7a-7p:     Events:   -Mag 2.0 and replaced with 400 mg po x 2 doses with a am recheck  -K+ and  Phos re-checks in the am as well as a BMP  -Tylenol changed to 1000 mg po TID  -Oxycodone held by the provider  -Pt going outside with her family      Neuro: A&Ox4, PERRLA, neuro intact, reports new headaches after having a stroke.     Cardiac: SB-SR with 1st AVB with HR 57-80. VSS, afebrile. + pulses & no edema.      Respiratory: Sating 98%-100% on RA, LS-CTA.    GI/: Adequate urine output, brett/clear and getting up to the BR. Pt starting to have her menses today and thus urine is pink tinged/bloody at times.     Diet/appetite: Tolerating low fiber diet and eating well. ACHS gluc checks: 151, 116, 219    Activity:  Independent up to chair and in halls.  Going outside with her family who arrive for a visit this afternoon.     Pain: At acceptable level on current regimen; Using scheduled Tylenol for pain and hot packs to the back of the neck.     Skin: Scattered bruising    LDA's: PIV x 1-SL    Plan: Continue with POC. Notify primary team with changes.    -Colonoscopy to be scheduled for Monday @ 0900  -Pt has a dentist appointment on Tuesday @ 0930, which the provider will she if she can move forward.     -LVAD teaching to occur on Monday at 1100, per LVAD coordinator (Tom Mauricio) who was called today  -Provider from [facility] planning for pt to discharge home Monday afternoon after colonoscopy if the dental appointment can be moved forward.   -Mammogram and PAP can be completed as outpatient

## 2023-09-09 NOTE — PROGRESS NOTES
[facility]   Cardiology II Service / Advanced Heart Failure  Daily Progress Note  Date of Service: 9/1/2023      Patient: Jamilah Jiménez  MRN: [id number]  Admission Date: 8/31/2023  Hospital Day # 9    Assessment and Plan:  Jamilah Jiménez is a 47-year-old female with HFrEF, Class III, Stage C-D, DM2, HTN, PE admitted following RHC with CI 1.61. Admitted for further evaluation and consideration for advanced heart failure therapies.     9/8:  - Hypovolemic. Continue to hold diuretics  - LVAD teaching likely on 09/11, followed by Palliative   - Mammogram can be completed outpatient  - Colonoscopy 09/11  - dental consult 09/12 09:30      #HFrEF (LVEF 18%), non-ischemic cardiomyopathy (genetic) s/p ICD  #HTN   #HLD   ACC/AHA Stage D, NYHA Symptom Class IV  Patient with progressively worsening dyspnea, edema, overall functional status over the last ~ 2 years. 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 Has been on diuretics, GDMT, has ICD in place.     Primary Cardiologist: Dr. Saucedo Last seen 12/06/2022   Ischemic Eval: Coronary angiogram 12/24/2020 with no significant coronary artery disease, elevated LVEDP 24 mmHg   RHC 08/31/2023: RA 9; PA 41/22/30; Amina CO/CI 3.04/1.61   CMR 08/31/2023: Severely dilated LV with global systolic function severely reduced, LVEF 18%, severe diffuse hypokinesis, RV with normal cavity size and normal global systolic function, RVEF 53%  Cardiopulmonary Stress Test 09/01: Peak VO2 12.70 ml/kg/min; VE/VCO2 59.11; RER 0.99      Volume: Euvolemic   Diuretic: PTA Torsemide 100 mg BID (held since 09/03/23, Remains Euvolemic)  ACE-I/ARB/ARNi: Entresto 49/51 BID   BB: Metoprolol XL 25 mg (09/07/23- present)  Aldosterone antagonist: Spironolactone 25 mg (09/07/23)  SGLT2i: Dapagliflozin 10 mg   Hydral/Nitrates: hydralazine 25 TID held since 09/06/23   SCD prophylaxis: ICD 11/2022   Statin: Atorvastatin 80 mg        DATE MAP CVP PAP  PCWP Amina CO Amina CI SVR MVo2 Therapies Weight    08/31/2023   9 41/22/30 19 3.04 1.61       172 lbs    10/2022   12 76/35/50 38 3.25 1.72       169 lbs      LVAD/Transplant Evaluation Checklist  [x] Labs (CBC, CMP, PT/INR, cystatin C, prealbumin, UA + micro) Cystatin 1.6  [x] Infectious (Hep A/B/C, HIV, treponema, HSV 1/2 IgG, CMV IgG, Toxo IgG, EBV IgG, varicella IgG, Quant gold, COVID vaccine/PCR)  [x] Utox/nicotine and cotinine/PeTH   [x] Immunocompatibility (last transfusion, ABO, HLA tissue typing, PRA)  [x] ECG, Echo - 08/31/2023: Severely dilated LV with global systolic function severely reduced, LVEF 18%, severe diffuse hypokinesis, RV with normal cavity size and normal global systolic function, RVEF 53%  [x] CPX 9/1/2023- Peak VO2 12.70 ml/kg/min; VE/VCO2 59.11; RER 0.99   [x] 6MWT   [x] RHC - 8/31/2023: RA 9; PA 41/22/30; Amina CO/CI 3.04/1.61   [x] CVTS consult  [x] Social work consult- conditional candidate for advanced therapies. In urgent situation, writer would favor LVAD. Concerns with heart transplant due to fairly recent meth use.   [] Palliative care consult   [x] Neuropsych consult  [x] Nutrition consult  [x] CT Dental   [x] Dental consult  [x] Abd US + doppler  [x] Extremity US and ABIs  [x] Carotid US (if DM or ICM or >49yo)  [x] PFTs  [] Dexa - may be ordered outpatient   [x] CT head non-contrast  [x] CT CAP non-contrast -  Sub-6 mm pulmonary nodules. Optional follow-up CT of the chest in  one year, if patient is high risk per Fleischner criteria  [] colonoscopy (>49 yo) - ordered GI consult   [x] HCG  [] mammogram (>39 yo) will be done outpatient   [] Pap test - will be completed in outpatient setting with the PCP      #Nausea - resolved   #Aphasia - improved   #Right-sided numbness and weakness - improved   On 09/02, she developed acute-onset expressive aphasia and right-sided weakness/numbness, CTH was negative for acute bleeding, but did show an area of hypodensity in the left parietal  lobe consistent with old stroke. CTA was negative for LVO. Given a clinical syndrome consistent with acute ischemic stroke localizing to the left frontal and parietal lobes, and an absence of contraindications to thrombolytic therapy, tenecteplase (TNK) was administered. She was transferred to the neurocritical care unit for further management. Similar episode 09/03 where she was unable to articulate her name or date of birth with slow and slurred speech and word finding difficulty. Throughout the episode, was noted to have purposeful, strong, equal strength bilaterally. Repeat Head CT without evidence of intracranial hemorrhage, stable appearance of patchy hypoattenuation of the left parietal periventricular matter.  Seen by psych 09/04/23 noted to have some component of conversion disorder.   - seen by neurology and psychiatry  - MRI scan 09/05/23 will follow up   - Increased sertraline to 150 mg, trazodone 50 mg at bedtime    - will hold on starting Plavix at this time.     #Type 2 DM   Most recent Hgb A1c 7.8 on 08/10/2023  - Detemir 40 U at bedtime  - MSSI       #Sub-6cm pulmonary nodules, incidental finding on CT chest 9/2023   - Optional follow-up CT of the chest in one year, if patient is high risk per Fleischner criteria      Rosa Maria Dunbar MD  PGY-2   Internal Medicine Resident     ================================================================    Interval History/ROS:   NAEON. Patient denies any SOB, chest pain or dizziness. She reports headache overnight improved with medication and warm compress. She denies any new weakness, numbness, tingling, difficulty word finding.        Medications: Reviewed in EPIC.     Physical Exam:   Temp:  [97.8  F (36.6  C)-98.5  F (36.9  C)] 98.1  F (36.7  C)  Pulse:  [56-85] 66  Resp:  [14-24] 16  BP: ()/(47-77) 108/70  SpO2:  [98 %-99 %] 98 %      GEN: No acute distress   HEENT: EOMI, no icterus, moist mucous membranes   CV: RRR, normal s1/s2, no murmurs. JVP ~8cm    CHEST: Normal work of breathing. Lungs CTAB, no wheezes or crackles.   ABD: Soft, Non-tender.  Abdomenal distension present but less than prior days.   EXT: Warm and well-perfused, trace edema  NEURO: Awake, alert, answering questions appropriately, motor grossly nonfocal  PSYCH: cooperative, affect appropriate    Data:  Lab Results   Component Value Date    WBC 3.5 (L) 09/07/2023    HGB 11.2 (L) 09/07/2023    HCT 32.8 (L) 09/07/2023     09/07/2023     09/09/2023    POTASSIUM 4.1 09/09/2023    CHLORIDE 110 (H) 09/09/2023    CO2 22 09/09/2023    BUN 10.5 09/09/2023    CR 0.99 (H) 09/09/2023     (H) 09/09/2023    NTBNPI 1,425 (H) 08/31/2023    AST 23 09/02/2023    ALT 13 09/02/2023    ALKPHOS 93 09/02/2023    BILITOTAL 1.0 09/02/2023    INR 1.15 09/03/2023                Lab Results   Component Value Date     09/05/2023    Lab Results   Component Value Date    CHLORIDE 99 09/05/2023    Lab Results   Component Value Date    BUN 38.1 09/05/2023      Lab Results   Component Value Date    POTASSIUM 3.7 09/05/2023    Lab Results   Component Value Date    CO2 23 09/05/2023    Lab Results   Component Value Date    CR 1.46 09/05/2023        Lab Results   Component Value Date    WBC 3.5 (L) 09/07/2023    HGB 11.2 (L) 09/07/2023    HCT 32.8 (L) 09/07/2023    MCV 84 09/07/2023     09/07/2023         Imaging:    CT Head w/o Contrast (09/03/2023)  MPRESSION:   1. No CT evidence of intracranial hemorrhage.  2. Stable appearance of patchy hypoattenuation of the left parietal  periventricular white matter.     CT Head w/o Contrast (09/02/2023)  Impression:   1. No evidence of intracranial hemorrhage.  2. Head CTA demonstrates no aneurysm or stenosis of the major  intracranial arteries.   3. Neck CTA demonstrates no stenosis of the major cervical arteries.  Mild atherosclerotic changes of bilateral carotid bifurcations.  4. Mild patchy low attenuation within the left parietal white matter;   stable  from yesterday's study; this might represent old infarct or  gliotic changes. However may consider MRI to exclude acute infarct  given the limitations of C    CTA Head Neck (09/02/2023)  Impression:   1. No evidence of intracranial hemorrhage.  2. Head CTA demonstrates no aneurysm or stenosis of the major  intracranial arteries.   3. Neck CTA demonstrates no stenosis of the major cervical arteries.  Mild atherosclerotic changes of bilateral carotid bifurcations.  4. Mild patchy low attenuation within the left parietal white matter;   stable from yesterday's study; this might represent old infarct or  gliotic changes. However may consider MRI to exclude acute infarct  given the limitations of CT.     Chest CT 9/1/2023  IMPRESSION:   1. No acute or suspicious abnormalities in the chest, abdomen, or  pelvis.  2. Sub-6 mm pulmonary nodules. Optional follow-up CT of the chest in  one year, if patient is high risk per Fleischner criteria    CT Head w/o contrast 9/1/2023  No suspicious intracranial finding. Subtle patchy  hypodensities in the left parietal subcortical white matter, likely  related to gliosis from underlying chronic small vessel disease.    CT Dental wo contrast 9/1/2023  Multifocal scattered dental caries and dental  reconstruction. Most notably there is a large cavity in the left  maxillary first premolar tooth, associated with periapical lucency and  dehiscence of the adjacent outer maxillary cortical bone, compatible  with periapical periodontitis.    CXR 9/1/2023  ICD in place.    US BI Doppler 9/1/2023  1. RIGHT:       A. Resting MARLENE is normal, 1.18.     2. LEFT:       A. Resting MARLENE is normal, 1.11.    Upper Extremity US 9/1/2023  RIGHT:  Subclavian artery, medial: 64/0 cm/s, triphasic, 10.6 mm  Subclavian artery, mid: 62/0 cm/s, triphasic, 8.7 mm  Subclavian artery, lateral: 62/0 cm/s, triphasic, 9.1 mm     Axillary artery: 56/0 cm/s, triphasic, 8.2 mm     LEFT:  Subclavian artery, medial: 76/0  cm/s, triphasic, 8.5 mm  Subclavian artery, mid: 105/0 cm/s, triphasic, 8.6 mm  Subclavian artery, lateral: 71/0 cm/s, triphasic, 7.9 mm     Axillary artery: 69/0 cm/s, triphasic, 8.1 mm                                                                      IMPRESSION: Patent bilateral subclavian and axillary arteries with  measurements as in the report.    Carotid US 9/1/2023  RIGHT ICA: Less than 50% diameter narrowing by grayscale imaging  and sonographic velocity criteria.     LEFT ICA:  Less than 50% diameter narrowing by grayscale imaging  and sonographic velocity criteria

## 2023-09-09 NOTE — PLAN OF CARE
Goal Outcome Evaluation:      Plan of Care Reviewed With: patient    Overall Patient Progress: improvingOverall Patient Progress: improving    Outcome Evaluation: Pt progressing towards her goals and has a stable shift with stable vitals. Eating an drinking well throughout the shift. Plan for pt to have a colonoscopy on Monday and dental appointment on Monday.  Per provider plan for pt to discharge home on Monday afternoon.      Problem: Plan of Care - These are the overarching goals to be used throughout the patient stay.    Goal: Plan of Care Review  Description: The Plan of Care Review/Shift note should be completed every shift.  The Outcome Evaluation is a brief statement about your assessment that the patient is improving, declining, or no change.  This information will be displayed automatically on your shift note.  Recent Flowsheet Documentation  Taken 9/9/2023 1316 by Adina Marinelli RN  Outcome Evaluation: Pt progressing towards her goals and has a stable shift with stable vitals. Eating an drinking well throughout the shift. Plan for pt to have a colonoscopy on Monday and dental appointment on Monday.  Per provider plan for pt to discharge home on Monday afternoon.  Goal: Absence of Hospital-Acquired Illness or Injury  Intervention: Identify and Manage Fall Risk  Recent Flowsheet Documentation  Taken 9/9/2023 1200 by Adina Marinelli RN  Safety Promotion/Fall Prevention:   assistive device/personal items within reach   clutter free environment maintained   nonskid shoes/slippers when out of bed   patient and family education   room near nurse's station  Taken 9/9/2023 0830 by Adina Marinelli RN  Safety Promotion/Fall Prevention:   assistive device/personal items within reach   clutter free environment maintained   nonskid shoes/slippers when out of bed   patient and family education   room near nurse's station  Intervention: Prevent Skin Injury  Recent Flowsheet Documentation  Taken 9/9/2023 1300 by Adina Marinelli  RN  Body Position: position changed independently  Taken 9/9/2023 1000 by Adina Marinelli RN  Body Position:   position changed independently   weight shifting  Taken 9/9/2023 0749 by Adina Marinelli RN  Body Position:   position changed independently   foot of bed elevated   weight shifting  Intervention: Prevent and Manage VTE (Venous Thromboembolism) Risk  Recent Flowsheet Documentation  Taken 9/9/2023 1200 by Adina Marinelli RN  VTE Prevention/Management: SCDs (sequential compression devices) off  Taken 9/9/2023 0830 by Adina Marinelli RN  VTE Prevention/Management: SCDs (sequential compression devices) off  Goal: Optimal Comfort and Wellbeing  Intervention: Monitor Pain and Promote Comfort  Recent Flowsheet Documentation  Taken 9/9/2023 1208 by Adina Marinelli RN  Pain Management Interventions:   medication (see MAR)   emotional support   heat applied   rest  Taken 9/9/2023 1050 by Adina Marinelli RN  Pain Management Interventions:   medication (see MAR)   emotional support   heat applied   rest  Taken 9/9/2023 0749 by Adina Marinelli RN  Pain Management Interventions:   medication (see MAR)   environmental changes   emotional support   rest   repositioned  Intervention: Provide Person-Centered Care  Recent Flowsheet Documentation  Taken 9/9/2023 1200 by Adina Marinelli RN  Trust Relationship/Rapport:   care explained   choices provided   emotional support provided   empathic listening provided   questions answered   questions encouraged   reassurance provided   thoughts/feelings acknowledged  Taken 9/9/2023 0830 by Adina Marinelli RN  Trust Relationship/Rapport:   care explained   choices provided   emotional support provided   empathic listening provided   questions answered   questions encouraged   reassurance provided   thoughts/feelings acknowledged     Problem: Plan of Care - These are the overarching goals to be used throughout the patient stay.    Goal: Plan of Care Review  Description: The Plan of Care Review/Shift note should  "be completed every shift.  The Outcome Evaluation is a brief statement about your assessment that the patient is improving, declining, or no change.  This information will be displayed automatically on your shift note.  Outcome: Progressing  Flowsheets (Taken 9/9/2023 1316)  Outcome Evaluation: Pt progressing towards her goals and has a stable shift with stable vitals. Eating an drinking well throughout the shift. Plan for pt to have a colonoscopy on Monday and dental appointment on Monday.  Per provider plan for pt to discharge home on Monday afternoon.  Goal: Patient-Specific Goal (Individualized)  Description: You can add care plan individualizations to a care plan. Examples of Individualization might be:  \"Parent requests to be called daily at 9am for status\", \"I have a hard time hearing out of my right ear\", or \"Do not touch me to wake me up as it startles me\".  Outcome: Progressing  Goal: Absence of Hospital-Acquired Illness or Injury  Outcome: Progressing  Intervention: Identify and Manage Fall Risk  Recent Flowsheet Documentation  Taken 9/9/2023 1200 by Adina Marinelli, RN  Safety Promotion/Fall Prevention:   assistive device/personal items within reach   clutter free environment maintained   nonskid shoes/slippers when out of bed   patient and family education   room near nurse's station  Taken 9/9/2023 0830 by Adina Marinelli, ELDON  Safety Promotion/Fall Prevention:   assistive device/personal items within reach   clutter free environment maintained   nonskid shoes/slippers when out of bed   patient and family education   room near nurse's station  Intervention: Prevent Skin Injury  Recent Flowsheet Documentation  Taken 9/9/2023 1300 by Adina Marinelli, RN  Body Position: position changed independently  Taken 9/9/2023 1000 by Adina Marinelli, RN  Body Position:   position changed independently   weight shifting  Taken 9/9/2023 0749 by Adina Marinelli, RN  Body Position:   position changed independently   foot of bed elevated   " weight shifting  Intervention: Prevent and Manage VTE (Venous Thromboembolism) Risk  Recent Flowsheet Documentation  Taken 9/9/2023 1200 by Adina Marinelli RN  VTE Prevention/Management: SCDs (sequential compression devices) off  Taken 9/9/2023 0830 by Adina Marinelli RN  VTE Prevention/Management: SCDs (sequential compression devices) off  Goal: Optimal Comfort and Wellbeing  Outcome: Progressing  Intervention: Monitor Pain and Promote Comfort  Recent Flowsheet Documentation  Taken 9/9/2023 1208 by Adina Marinelli RN  Pain Management Interventions:   medication (see MAR)   emotional support   heat applied   rest  Taken 9/9/2023 1050 by Adina Marinelli RN  Pain Management Interventions:   medication (see MAR)   emotional support   heat applied   rest  Taken 9/9/2023 0749 by Adina Marinelli RN  Pain Management Interventions:   medication (see MAR)   environmental changes   emotional support   rest   repositioned  Intervention: Provide Person-Centered Care  Recent Flowsheet Documentation  Taken 9/9/2023 1200 by Adina Marinelli RN  Trust Relationship/Rapport:   care explained   choices provided   emotional support provided   empathic listening provided   questions answered   questions encouraged   reassurance provided   thoughts/feelings acknowledged  Taken 9/9/2023 0830 by Adina Marinelli RN  Trust Relationship/Rapport:   care explained   choices provided   emotional support provided   empathic listening provided   questions answered   questions encouraged   reassurance provided   thoughts/feelings acknowledged  Goal: Readiness for Transition of Care  Outcome: Progressing     Problem: Heart Failure  Goal: Optimal Functional Ability  Intervention: Optimize Functional Ability  Recent Flowsheet Documentation  Taken 9/9/2023 1300 by Adina Marinelli RN  Activity Management: (Pt going outside with her family)   activity adjusted per tolerance   activity encouraged   other (see comments)  Taken 9/9/2023 1000 by Adina Marinelli RN  Activity  Management:   activity adjusted per tolerance   activity encouraged   ambulated to bathroom  Taken 9/9/2023 0749 by Adina Marinelli RN  Activity Management:   activity adjusted per tolerance   activity encouraged  Goal: Effective Oxygenation and Ventilation  Intervention: Promote Airway Secretion Clearance  Recent Flowsheet Documentation  Taken 9/9/2023 1300 by Adina Marinelli RN  Activity Management: (Pt going outside with her family)   activity adjusted per tolerance   activity encouraged   other (see comments)  Taken 9/9/2023 1200 by Adina Marinelli RN  Cough And Deep Breathing: done independently per patient  Taken 9/9/2023 1000 by Adina Marinelli RN  Activity Management:   activity adjusted per tolerance   activity encouraged   ambulated to bathroom  Taken 9/9/2023 0830 by Adina Marinelli RN  Cough And Deep Breathing: done independently per patient  Taken 9/9/2023 0749 by Adina Marinelli RN  Activity Management:   activity adjusted per tolerance   activity encouraged  Intervention: Optimize Oxygenation and Ventilation  Recent Flowsheet Documentation  Taken 9/9/2023 1000 by Adina Marinelli RN  Head of Bed (HOB) Positioning: HOB at 30-45 degrees  Taken 9/9/2023 0749 by Adina Marinelli RN  Head of Bed (HOB) Positioning: HOB at 30-45 degrees  Goal: Effective Breathing Pattern During Sleep  Intervention: Monitor and Manage Obstructive Sleep Apnea  Recent Flowsheet Documentation  Taken 9/9/2023 1200 by Adina Marinelli RN  Medication Review/Management: medications reviewed  Taken 9/9/2023 0830 by Adina Marinelli RN  Medication Review/Management: medications reviewed     Problem: Heart Failure  Goal: Optimal Cardiac Output  Outcome: Progressing  Goal: Stable Heart Rate and Rhythm  Outcome: Progressing  Goal: Fluid and Electrolyte Balance  Outcome: Progressing     Problem: Risk for Delirium  Goal: Improved Behavioral Control  Intervention: Minimize Safety Risk  Recent Flowsheet Documentation  Taken 9/9/2023 1200 by Adina Marinelli RN  Enhanced Safety  Measures: room near unit station  Trust Relationship/Rapport:   care explained   choices provided   emotional support provided   empathic listening provided   questions answered   questions encouraged   reassurance provided   thoughts/feelings acknowledged  Taken 9/9/2023 0830 by Adina Marinelli RN  Enhanced Safety Measures: room near unit station  Trust Relationship/Rapport:   care explained   choices provided   emotional support provided   empathic listening provided   questions answered   questions encouraged   reassurance provided   thoughts/feelings acknowledged     Problem: Pain Acute  Goal: Optimal Pain Control and Function  Outcome: Progressing  Intervention: Develop Pain Management Plan  Recent Flowsheet Documentation  Taken 9/9/2023 1208 by Adina Marinelli RN  Pain Management Interventions:   medication (see MAR)   emotional support   heat applied   rest  Taken 9/9/2023 1050 by Adina Marinelli RN  Pain Management Interventions:   medication (see MAR)   emotional support   heat applied   rest  Taken 9/9/2023 0749 by Adina Marinelli RN  Pain Management Interventions:   medication (see MAR)   environmental changes   emotional support   rest   repositioned  Intervention: Prevent or Manage Pain  Recent Flowsheet Documentation  Taken 9/9/2023 1200 by Adina Marinelli RN  Medication Review/Management: medications reviewed  Taken 9/9/2023 0830 by Adina Marinelli RN  Medication Review/Management: medications reviewed     Problem: Stroke, Ischemic (Includes Transient Ischemic Attack)  Goal: Effective Bowel Elimination  Intervention: Promote Effective Bowel Elimination  Recent Flowsheet Documentation  Taken 9/9/2023 1200 by Adina Marinelli RN  Bowel Elimination Management: hygiene measures promoted  Bowel Program: maintenance program followed  Taken 9/9/2023 0830 by Adina Marinelli RN  Bowel Elimination Management: hygiene measures promoted  Bowel Program: maintenance program followed  Goal: Optimal Cerebral Tissue Perfusion  Outcome:  Progressing  Goal: Optimal Cognitive Function  Outcome: Progressing  Goal: Optimal Functional Ability  Intervention: Optimize Functional Ability  Recent Flowsheet Documentation  Taken 9/9/2023 1300 by Adina Marinelli RN  Activity Management: (Pt going outside with her family)   activity adjusted per tolerance   activity encouraged   other (see comments)  Taken 9/9/2023 1000 by Adina Marinelli RN  Activity Management:   activity adjusted per tolerance   activity encouraged   ambulated to bathroom  Taken 9/9/2023 0749 by Adina Marinelli RN  Activity Management:   activity adjusted per tolerance   activity encouraged  Goal: Effective Oxygenation and Ventilation  Intervention: Optimize Oxygenation and Ventilation  Recent Flowsheet Documentation  Taken 9/9/2023 1000 by Adina Marinelli RN  Head of Bed (HOB) Positioning: HOB at 30-45 degrees  Taken 9/9/2023 0749 by Adina Marinelli RN  Head of Bed (HOB) Positioning: HOB at 30-45 degrees  Goal: Improved Sensorimotor Function  Intervention: Optimize Range of Motion, Motor Control and Function  Recent Flowsheet Documentation  Taken 9/9/2023 1200 by Adina Marinelli RN  Range of Motion: active ROM (range of motion) encouraged  Taken 9/9/2023 1000 by Adina Marinelli RN  Positioning/Transfer Devices:   pillows   in use  Taken 9/9/2023 0830 by Adina Marinelli RN  Range of Motion: active ROM (range of motion) encouraged  Taken 9/9/2023 0749 by Adina Marinelli RN  Positioning/Transfer Devices:   pillows   in use

## 2023-09-10 LAB
ANION GAP SERPL CALCULATED.3IONS-SCNC: 9 MMOL/L (ref 7–15)
BUN SERPL-MCNC: 8.7 MG/DL (ref 6–20)
CALCIUM SERPL-MCNC: 8.7 MG/DL (ref 8.6–10)
CHLORIDE SERPL-SCNC: 110 MMOL/L (ref 98–107)
CREAT SERPL-MCNC: 0.96 MG/DL (ref 0.51–0.95)
DEPRECATED HCO3 PLAS-SCNC: 21 MMOL/L (ref 22–29)
EGFRCR SERPLBLD CKD-EPI 2021: 74 ML/MIN/1.73M2
GLUCOSE BLDC GLUCOMTR-MCNC: 127 MG/DL (ref 70–99)
GLUCOSE BLDC GLUCOMTR-MCNC: 131 MG/DL (ref 70–99)
GLUCOSE BLDC GLUCOMTR-MCNC: 160 MG/DL (ref 70–99)
GLUCOSE BLDC GLUCOMTR-MCNC: 98 MG/DL (ref 70–99)
GLUCOSE SERPL-MCNC: 139 MG/DL (ref 70–99)
HOLD SPECIMEN: NORMAL
MAGNESIUM SERPL-MCNC: 1.8 MG/DL (ref 1.7–2.3)
PHOSPHATE SERPL-MCNC: 2.3 MG/DL (ref 2.5–4.5)
POTASSIUM SERPL-SCNC: 4.4 MMOL/L (ref 3.4–5.3)
SODIUM SERPL-SCNC: 140 MMOL/L (ref 136–145)

## 2023-09-10 PROCEDURE — 120N000003 HC R&B IMCU UMMC

## 2023-09-10 PROCEDURE — 250N000013 HC RX MED GY IP 250 OP 250 PS 637

## 2023-09-10 PROCEDURE — 250N000011 HC RX IP 250 OP 636: Performed by: NURSE PRACTITIONER

## 2023-09-10 PROCEDURE — 80048 BASIC METABOLIC PNL TOTAL CA: CPT

## 2023-09-10 PROCEDURE — 250N000013 HC RX MED GY IP 250 OP 250 PS 637: Performed by: STUDENT IN AN ORGANIZED HEALTH CARE EDUCATION/TRAINING PROGRAM

## 2023-09-10 PROCEDURE — 84100 ASSAY OF PHOSPHORUS: CPT | Performed by: INTERNAL MEDICINE

## 2023-09-10 PROCEDURE — 250N000013 HC RX MED GY IP 250 OP 250 PS 637: Performed by: INTERNAL MEDICINE

## 2023-09-10 PROCEDURE — 83735 ASSAY OF MAGNESIUM: CPT | Performed by: INTERNAL MEDICINE

## 2023-09-10 PROCEDURE — 36415 COLL VENOUS BLD VENIPUNCTURE: CPT

## 2023-09-10 RX ORDER — MAGNESIUM OXIDE 400 MG/1
400 TABLET ORAL EVERY 4 HOURS
Status: COMPLETED | OUTPATIENT
Start: 2023-09-10 | End: 2023-09-10

## 2023-09-10 RX ADMIN — HYDROXYZINE HYDROCHLORIDE 25 MG: 25 TABLET, FILM COATED ORAL at 10:43

## 2023-09-10 RX ADMIN — ATORVASTATIN CALCIUM 80 MG: 80 TABLET, FILM COATED ORAL at 19:34

## 2023-09-10 RX ADMIN — POTASSIUM & SODIUM PHOSPHATES POWDER PACK 280-160-250 MG 1 PACKET: 280-160-250 PACK at 19:34

## 2023-09-10 RX ADMIN — ACETAMINOPHEN 1000 MG: 500 TABLET ORAL at 07:35

## 2023-09-10 RX ADMIN — MAGNESIUM OXIDE TAB 400 MG (241.3 MG ELEMENTAL MG) 400 MG: 400 (241.3 MG) TAB at 14:12

## 2023-09-10 RX ADMIN — ACETAMINOPHEN 1000 MG: 500 TABLET ORAL at 19:34

## 2023-09-10 RX ADMIN — DAPAGLIFLOZIN 10 MG: 10 TABLET, FILM COATED ORAL at 07:35

## 2023-09-10 RX ADMIN — HEPARIN SODIUM 5000 UNITS: 5000 INJECTION, SOLUTION INTRAVENOUS; SUBCUTANEOUS at 07:36

## 2023-09-10 RX ADMIN — HYDROXYZINE HYDROCHLORIDE 25 MG: 25 TABLET, FILM COATED ORAL at 04:42

## 2023-09-10 RX ADMIN — METOPROLOL SUCCINATE 25 MG: 25 TABLET, EXTENDED RELEASE ORAL at 07:35

## 2023-09-10 RX ADMIN — POTASSIUM & SODIUM PHOSPHATES POWDER PACK 280-160-250 MG 1 PACKET: 280-160-250 PACK at 10:39

## 2023-09-10 RX ADMIN — SERTRALINE HYDROCHLORIDE 150 MG: 100 TABLET ORAL at 07:35

## 2023-09-10 RX ADMIN — HEPARIN SODIUM 5000 UNITS: 5000 INJECTION, SOLUTION INTRAVENOUS; SUBCUTANEOUS at 23:18

## 2023-09-10 RX ADMIN — HYDROXYZINE HYDROCHLORIDE 25 MG: 25 TABLET, FILM COATED ORAL at 19:34

## 2023-09-10 RX ADMIN — POLYETHYLENE GLYCOL 3350, SODIUM SULFATE ANHYDROUS, SODIUM BICARBONATE, SODIUM CHLORIDE, POTASSIUM CHLORIDE 4000 ML: 236; 22.74; 6.74; 5.86; 2.97 POWDER, FOR SOLUTION ORAL at 17:01

## 2023-09-10 RX ADMIN — ACETAMINOPHEN 1000 MG: 500 TABLET ORAL at 14:12

## 2023-09-10 RX ADMIN — MAGNESIUM OXIDE TAB 400 MG (241.3 MG ELEMENTAL MG) 400 MG: 400 (241.3 MG) TAB at 10:39

## 2023-09-10 RX ADMIN — SACUBITRIL AND VALSARTAN 1 TABLET: 49; 51 TABLET, FILM COATED ORAL at 07:53

## 2023-09-10 RX ADMIN — HEPARIN SODIUM 5000 UNITS: 5000 INJECTION, SOLUTION INTRAVENOUS; SUBCUTANEOUS at 16:19

## 2023-09-10 RX ADMIN — SACUBITRIL AND VALSARTAN 1 TABLET: 49; 51 TABLET, FILM COATED ORAL at 19:34

## 2023-09-10 RX ADMIN — POTASSIUM & SODIUM PHOSPHATES POWDER PACK 280-160-250 MG 1 PACKET: 280-160-250 PACK at 14:12

## 2023-09-10 RX ADMIN — INSULIN DETEMIR 40 UNITS: 100 INJECTION, SOLUTION SUBCUTANEOUS at 22:00

## 2023-09-10 RX ADMIN — SPIRONOLACTONE 25 MG: 25 TABLET ORAL at 07:35

## 2023-09-10 ASSESSMENT — ACTIVITIES OF DAILY LIVING (ADL)
ADLS_ACUITY_SCORE: 20
ADLS_ACUITY_SCORE: 21
ADLS_ACUITY_SCORE: 20
ADLS_ACUITY_SCORE: 21
ADLS_ACUITY_SCORE: 20
ADLS_ACUITY_SCORE: 20

## 2023-09-10 NOTE — PROGRESS NOTES
[facility]   Cardiology II Service / Advanced Heart Failure  Daily Progress Note  Date of Service: 9/1/2023      Patient: Jamilah Jiménez  MRN: [id number]  Admission Date: 8/31/2023  Hospital Day # 10    Assessment and Plan:  Jamilah Jiménez is a 47-year-old female with HFrEF, Class III, Stage C-D, DM2, HTN, PE admitted following RHC with CI 1.61. Admitted for further evaluation and consideration for advanced heart failure therapies.     9/8:  - Hypovolemic. Continue to hold diuretics  - Per patient is able to make appt with Dentist at home for evaluation  - Colonoscopy scheduled for tomorrow, Prep today and NPO at midnight  - LVAD teaching likely on 09/11, followed by Palliative   - Mammogram can be completed outpatient  - Colonoscopy 09/11  - May consider discharging following colonoscopy, Pt has ride for the day with her cousin       #HFrEF (LVEF 18%), non-ischemic cardiomyopathy (genetic) s/p ICD  #HTN   #HLD   ACC/AHA Stage D, NYHA Symptom Class IV  Patient with progressively worsening dyspnea, edema, overall functional status over the last ~ 2 years. 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 Has been on diuretics, GDMT, has ICD in place.     Primary Cardiologist: Dr. Saucedo Last seen 12/06/2022   Ischemic Eval: Coronary angiogram 12/24/2020 with no significant coronary artery disease, elevated LVEDP 24 mmHg   RHC 08/31/2023: RA 9; PA 41/22/30; Amina CO/CI 3.04/1.61   CMR 08/31/2023: Severely dilated LV with global systolic function severely reduced, LVEF 18%, severe diffuse hypokinesis, RV with normal cavity size and normal global systolic function, RVEF 53%  Cardiopulmonary Stress Test 09/01: Peak VO2 12.70 ml/kg/min; VE/VCO2 59.11; RER 0.99      Volume: Euvolemic   Diuretic: PTA Torsemide 100 mg BID (held since 09/03/23, Remains Euvolemic)  ACE-I/ARB/ARNi: Entresto 49/51 BID   BB: Metoprolol XL 25 mg (09/07/23- present)  Aldosterone antagonist:  Spironolactone 25 mg (09/07/23)  SGLT2i: Dapagliflozin 10 mg   Hydral/Nitrates: hydralazine 25 TID held since 09/06/23   SCD prophylaxis: ICD 11/2022   Statin: Atorvastatin 80 mg        DATE MAP CVP PAP PCWP Amina CO Amina CI SVR MVo2 Therapies Weight    08/31/2023   9 41/22/30 19 3.04 1.61       172 lbs    10/2022   12 76/35/50 38 3.25 1.72       169 lbs      LVAD/Transplant Evaluation Checklist  [x] Labs (CBC, CMP, PT/INR, cystatin C, prealbumin, UA + micro) Cystatin 1.6  [x] Infectious (Hep A/B/C, HIV, treponema, HSV 1/2 IgG, CMV IgG, Toxo IgG, EBV IgG, varicella IgG, Quant gold, COVID vaccine/PCR)  [x] Utox/nicotine and cotinine/PeTH   [x] Immunocompatibility (last transfusion, ABO, HLA tissue typing, PRA)  [x] ECG, Echo - 08/31/2023: Severely dilated LV with global systolic function severely reduced, LVEF 18%, severe diffuse hypokinesis, RV with normal cavity size and normal global systolic function, RVEF 53%  [x] CPX 9/1/2023- Peak VO2 12.70 ml/kg/min; VE/VCO2 59.11; RER 0.99   [x] 6MWT   [x] RHC - 8/31/2023: RA 9; PA 41/22/30; Amina CO/CI 3.04/1.61   [x] CVTS consult  [x] Social work consult- conditional candidate for advanced therapies. In urgent situation, writer would favor LVAD. Concerns with heart transplant due to fairly recent meth use.   [] Palliative care consult   [x] Neuropsych consult  [x] Nutrition consult  [x] CT Dental   [] Dental consult - Outpatient follow up with patient's dentist  [x] Abd US + doppler  [x] Extremity US and ABIs  [x] Carotid US (if DM or ICM or >51yo)  [x] PFTs  [] Dexa - may be ordered outpatient   [x] CT head non-contrast  [x] CT CAP non-contrast -  Sub-6 mm pulmonary nodules. Optional follow-up CT of the chest in  one year, if patient is high risk per Fleischner criteria  [] colonoscopy (>49 yo) - ordered GI consult   [x] HCG  [] mammogram (>39 yo) will be done outpatient   [] Pap test - will be completed in outpatient setting with the PCP      #Nausea - resolved   #Aphasia  - improved   #Right-sided numbness and weakness - improved   On 09/02, she developed acute-onset expressive aphasia and right-sided weakness/numbness, CTH was negative for acute bleeding, but did show an area of hypodensity in the left parietal lobe consistent with old stroke. CTA was negative for LVO. Given a clinical syndrome consistent with acute ischemic stroke localizing to the left frontal and parietal lobes, and an absence of contraindications to thrombolytic therapy, tenecteplase (TNK) was administered. She was transferred to the neurocritical care unit for further management. Similar episode 09/03 where she was unable to articulate her name or date of birth with slow and slurred speech and word finding difficulty. Throughout the episode, was noted to have purposeful, strong, equal strength bilaterally. Repeat Head CT without evidence of intracranial hemorrhage, stable appearance of patchy hypoattenuation of the left parietal periventricular matter.  Seen by psych 09/04/23 noted to have some component of conversion disorder.   - seen by neurology and psychiatry  - MRI scan 09/05/23 will follow up   - Increased sertraline to 150 mg, trazodone 50 mg at bedtime    - will hold on starting Plavix at this time.     #Type 2 DM   Most recent Hgb A1c 7.8 on 08/10/2023  - Detemir 40 U at bedtime  - MSSI       #Sub-6cm pulmonary nodules, incidental finding on CT chest 9/2023   - Optional follow-up CT of the chest in one year, if patient is high risk per Fleischner criteria      Rosa Maria Dunbar MD  PGY-2   Internal Medicine Resident     ================================================================    Interval History/ROS:   NAEON. Patient denies any SOB, chest pain or dizziness. She reports that her ride (Cousin) will be leaving on Monday 09/11 and would like to go home with her. She has a dentist at home and will be able to make an appointment following discharge.        Medications: Reviewed in EPIC.     Physical  Exam:   Temp:  [97.9  F (36.6  C)-98.7  F (37.1  C)] 97.9  F (36.6  C)  Pulse:  [60-80] 60  Resp:  [14-24] 18  BP: (108-129)/(64-80) 121/70  SpO2:  [98 %] 98 %      GEN: No acute distress   HEENT: EOMI, no icterus, moist mucous membranes   CV: RRR, normal s1/s2, no murmurs. JVP ~8cm   CHEST: Normal work of breathing. Lungs CTAB, no wheezes or crackles.   ABD: Soft, Non-tender.  Abdomenal distension present but less than prior days.   EXT: Warm and well-perfused, trace edema  NEURO: Awake, alert, answering questions appropriately, motor grossly nonfocal  PSYCH: cooperative, affect appropriate    Data:  Lab Results   Component Value Date    WBC 3.5 (L) 09/07/2023    HGB 11.2 (L) 09/07/2023    HCT 32.8 (L) 09/07/2023     09/07/2023     09/10/2023    POTASSIUM 4.4 09/10/2023    CHLORIDE 110 (H) 09/10/2023    CO2 21 (L) 09/10/2023    BUN 8.7 09/10/2023    CR 0.96 (H) 09/10/2023     (H) 09/10/2023    NTBNPI 1,425 (H) 08/31/2023    AST 23 09/02/2023    ALT 13 09/02/2023    ALKPHOS 93 09/02/2023    BILITOTAL 1.0 09/02/2023    INR 1.15 09/03/2023                Lab Results   Component Value Date     09/05/2023    Lab Results   Component Value Date    CHLORIDE 99 09/05/2023    Lab Results   Component Value Date    BUN 38.1 09/05/2023      Lab Results   Component Value Date    POTASSIUM 3.7 09/05/2023    Lab Results   Component Value Date    CO2 23 09/05/2023    Lab Results   Component Value Date    CR 1.46 09/05/2023        Lab Results   Component Value Date    WBC 3.5 (L) 09/07/2023    HGB 11.2 (L) 09/07/2023    HCT 32.8 (L) 09/07/2023    MCV 84 09/07/2023     09/07/2023         Imaging:    CT Head w/o Contrast (09/03/2023)  MPRESSION:   1. No CT evidence of intracranial hemorrhage.  2. Stable appearance of patchy hypoattenuation of the left parietal  periventricular white matter.     CT Head w/o Contrast (09/02/2023)  Impression:   1. No evidence of intracranial hemorrhage.  2. Head CTA  demonstrates no aneurysm or stenosis of the major  intracranial arteries.   3. Neck CTA demonstrates no stenosis of the major cervical arteries.  Mild atherosclerotic changes of bilateral carotid bifurcations.  4. Mild patchy low attenuation within the left parietal white matter;   stable from yesterday's study; this might represent old infarct or  gliotic changes. However may consider MRI to exclude acute infarct  given the limitations of C    CTA Head Neck (09/02/2023)  Impression:   1. No evidence of intracranial hemorrhage.  2. Head CTA demonstrates no aneurysm or stenosis of the major  intracranial arteries.   3. Neck CTA demonstrates no stenosis of the major cervical arteries.  Mild atherosclerotic changes of bilateral carotid bifurcations.  4. Mild patchy low attenuation within the left parietal white matter;   stable from yesterday's study; this might represent old infarct or  gliotic changes. However may consider MRI to exclude acute infarct  given the limitations of CT.     Chest CT 9/1/2023  IMPRESSION:   1. No acute or suspicious abnormalities in the chest, abdomen, or  pelvis.  2. Sub-6 mm pulmonary nodules. Optional follow-up CT of the chest in  one year, if patient is high risk per Fleischner criteria    CT Head w/o contrast 9/1/2023  No suspicious intracranial finding. Subtle patchy  hypodensities in the left parietal subcortical white matter, likely  related to gliosis from underlying chronic small vessel disease.    CT Dental wo contrast 9/1/2023  Multifocal scattered dental caries and dental  reconstruction. Most notably there is a large cavity in the left  maxillary first premolar tooth, associated with periapical lucency and  dehiscence of the adjacent outer maxillary cortical bone, compatible  with periapical periodontitis.    CXR 9/1/2023  ICD in place.    US BI Doppler 9/1/2023  1. RIGHT:       A. Resting MARLENE is normal, 1.18.     2. LEFT:       A. Resting MARLENE is normal, 1.11.    Upper  Extremity US 9/1/2023  RIGHT:  Subclavian artery, medial: 64/0 cm/s, triphasic, 10.6 mm  Subclavian artery, mid: 62/0 cm/s, triphasic, 8.7 mm  Subclavian artery, lateral: 62/0 cm/s, triphasic, 9.1 mm     Axillary artery: 56/0 cm/s, triphasic, 8.2 mm     LEFT:  Subclavian artery, medial: 76/0 cm/s, triphasic, 8.5 mm  Subclavian artery, mid: 105/0 cm/s, triphasic, 8.6 mm  Subclavian artery, lateral: 71/0 cm/s, triphasic, 7.9 mm     Axillary artery: 69/0 cm/s, triphasic, 8.1 mm                                                                      IMPRESSION: Patent bilateral subclavian and axillary arteries with  measurements as in the report.    Carotid US 9/1/2023  RIGHT ICA: Less than 50% diameter narrowing by grayscale imaging  and sonographic velocity criteria.     LEFT ICA:  Less than 50% diameter narrowing by grayscale imaging  and sonographic velocity criteria

## 2023-09-10 NOTE — PLAN OF CARE
Goal Outcome Evaluation:                 Outcome Evaluation: Pt progressing towards her goals and has a stable shift with stable vitals. Diet is clears throughout the shift due to having a colonoscopy on Monday and pt drinking well throughout the shift. Plan is for pt to have a colonoscopy on Monday. Per provider plan for pt to discharge home on Monday afternoon and can follow-up out pt to have a PAP, Mammogram, and her dental appoinments. Pt starting her colonoscopy prep at 1700 this evening.        Problem: Plan of Care - These are the overarching goals to be used throughout the patient stay.    Goal: Plan of Care Review  Description: The Plan of Care Review/Shift note should be completed every shift.  The Outcome Evaluation is a brief statement about your assessment that the patient is improving, declining, or no change.  This information will be displayed automatically on your shift note.  Recent Flowsheet Documentation  Taken 9/10/2023 1824 by Adina Marinelli, RN  Outcome Evaluation: Pt progressing towards her goals and has a stable shift with stable vitals. Diet is clears throughout the shift due to having a colonoscopy on Monday and pt drinking well throughout the shift. Plan is for pt to have a colonoscopy on Monday. Per provider plan for pt to discharge home on Monday afternoon and can follow-up out pt to have a PAP, Mammogram, and her dental appoinments. Pt starting her colonoscopy prep at 1700 this evening.  Goal: Absence of Hospital-Acquired Illness or Injury  Intervention: Identify and Manage Fall Risk  Recent Flowsheet Documentation  Taken 9/10/2023 1600 by Adina Marinelli, RN  Safety Promotion/Fall Prevention:   assistive device/personal items within reach   clutter free environment maintained   nonskid shoes/slippers when out of bed   patient and family education   room near nurse's station  Taken 9/10/2023 1200 by Adina Marinelli, RN  Safety Promotion/Fall Prevention:   assistive device/personal items within  reach   clutter free environment maintained   nonskid shoes/slippers when out of bed   patient and family education   room near nurse's station  Taken 9/10/2023 0800 by Adina Marinelli RN  Safety Promotion/Fall Prevention:   assistive device/personal items within reach   clutter free environment maintained   nonskid shoes/slippers when out of bed   patient and family education   room near nurse's station  Intervention: Prevent Skin Injury  Recent Flowsheet Documentation  Taken 9/10/2023 1600 by Adina Marinelli RN  Body Position:   position changed independently   turned   foot of bed elevated  Taken 9/10/2023 1200 by Adina Marinelli RN  Body Position:   position changed independently   turned   foot of bed elevated  Taken 9/10/2023 1000 by Adina Marinelli RN  Body Position:   position changed independently   supine   weight shifting   foot of bed elevated  Taken 9/10/2023 0800 by Adina Marinelli RN  Body Position:   position changed independently   turned   foot of bed elevated  Intervention: Prevent and Manage VTE (Venous Thromboembolism) Risk  Recent Flowsheet Documentation  Taken 9/10/2023 1600 by Adina Marinelli RN  VTE Prevention/Management: SCDs (sequential compression devices) off  Taken 9/10/2023 1200 by Adina Marinelli RN  VTE Prevention/Management: SCDs (sequential compression devices) off  Taken 9/10/2023 0800 by Adina Marinelli RN  VTE Prevention/Management: SCDs (sequential compression devices) off  Goal: Optimal Comfort and Wellbeing  Intervention: Monitor Pain and Promote Comfort  Recent Flowsheet Documentation  Taken 9/10/2023 1600 by Adina Marinelli RN  Pain Management Interventions:   medication (see MAR)   emotional support   heat applied   rest   quiet environment facilitated  Taken 9/10/2023 1450 by Adina Marinelli RN  Pain Management Interventions:   medication (see MAR)   emotional support   heat applied   rest   quiet environment facilitated  Taken 9/10/2023 1200 by Adina Marinelli RN  Pain Management Interventions:    medication (see MAR)   emotional support   heat applied   rest   quiet environment facilitated  Intervention: Provide Person-Centered Care  Recent Flowsheet Documentation  Taken 9/10/2023 1600 by Adina Marinelli RN  Trust Relationship/Rapport:   care explained   choices provided   emotional support provided   empathic listening provided   questions answered   questions encouraged   reassurance provided   thoughts/feelings acknowledged  Taken 9/10/2023 1200 by Adina Marinelli RN  Trust Relationship/Rapport:   care explained   choices provided   emotional support provided   empathic listening provided   questions answered   questions encouraged   reassurance provided   thoughts/feelings acknowledged  Taken 9/10/2023 0800 by Adina Marinelli RN  Trust Relationship/Rapport:   care explained   choices provided   emotional support provided   empathic listening provided   questions answered   questions encouraged   reassurance provided   thoughts/feelings acknowledged     Problem: Heart Failure  Goal: Optimal Functional Ability  Intervention: Optimize Functional Ability  Recent Flowsheet Documentation  Taken 9/10/2023 1600 by Adina Marinelli RN  Activity Management:   activity adjusted per tolerance   activity encouraged   ambulated in room   ambulated outside room   ambulated to bathroom  Taken 9/10/2023 1200 by Adina Marinelli RN  Activity Management:   activity adjusted per tolerance   activity encouraged  Taken 9/10/2023 1000 by Adina Marinelli RN  Activity Management:   activity adjusted per tolerance   activity encouraged   ambulated in room  Taken 9/10/2023 0800 by Adina Marinelli RN  Activity Management:   activity adjusted per tolerance   activity encouraged  Goal: Effective Oxygenation and Ventilation  Intervention: Promote Airway Secretion Clearance  Recent Flowsheet Documentation  Taken 9/10/2023 1600 by Adina Marinelli RN  Cough And Deep Breathing: done independently per patient  Activity Management:   activity adjusted per  tolerance   activity encouraged   ambulated in room   ambulated outside room   ambulated to bathroom  Taken 9/10/2023 1200 by Adina Marinelli RN  Cough And Deep Breathing: done independently per patient  Activity Management:   activity adjusted per tolerance   activity encouraged  Taken 9/10/2023 1000 by Adina Marinelli RN  Activity Management:   activity adjusted per tolerance   activity encouraged   ambulated in room  Taken 9/10/2023 0800 by Adina Marinelli RN  Cough And Deep Breathing: done independently per patient  Activity Management:   activity adjusted per tolerance   activity encouraged  Intervention: Optimize Oxygenation and Ventilation  Recent Flowsheet Documentation  Taken 9/10/2023 1600 by Adina Marinelli RN  Head of Bed (HOB) Positioning: HOB at 20-30 degrees  Taken 9/10/2023 1200 by Adina Marinelli RN  Head of Bed (HOB) Positioning: HOB at 20-30 degrees  Taken 9/10/2023 1000 by Adina Marinelli RN  Head of Bed (HOB) Positioning: HOB at 30-45 degrees  Taken 9/10/2023 0800 by Adina Marinelli RN  Head of Bed (HOB) Positioning: HOB at 20-30 degrees  Goal: Effective Breathing Pattern During Sleep  Intervention: Monitor and Manage Obstructive Sleep Apnea  Recent Flowsheet Documentation  Taken 9/10/2023 1600 by Adina Marinelli RN  Medication Review/Management: medications reviewed  Taken 9/10/2023 1200 by Adina Marinelli RN  Medication Review/Management: medications reviewed  Taken 9/10/2023 0800 by Adina Marinelli RN  Medication Review/Management: medications reviewed     Problem: Heart Failure  Goal: Optimal Cardiac Output  Outcome: Progressing  Goal: Stable Heart Rate and Rhythm  Outcome: Progressing  Goal: Fluid and Electrolyte Balance  Outcome: Progressing     Problem: Heart Failure  Goal: Optimal Cardiac Output  Outcome: Progressing  Goal: Stable Heart Rate and Rhythm  Outcome: Progressing  Goal: Fluid and Electrolyte Balance  Outcome: Progressing     Problem: Risk for Delirium  Goal: Improved Behavioral Control  Intervention:  Minimize Safety Risk  Recent Flowsheet Documentation  Taken 9/10/2023 1600 by Adina Marinelli RN  Enhanced Safety Measures: room near unit station  Trust Relationship/Rapport:   care explained   choices provided   emotional support provided   empathic listening provided   questions answered   questions encouraged   reassurance provided   thoughts/feelings acknowledged  Taken 9/10/2023 1200 by Adina Marinelli RN  Enhanced Safety Measures: room near unit station  Trust Relationship/Rapport:   care explained   choices provided   emotional support provided   empathic listening provided   questions answered   questions encouraged   reassurance provided   thoughts/feelings acknowledged  Taken 9/10/2023 0800 by Adina Marinelli RN  Enhanced Safety Measures: room near unit station  Trust Relationship/Rapport:   care explained   choices provided   emotional support provided   empathic listening provided   questions answered   questions encouraged   reassurance provided   thoughts/feelings acknowledged     Problem: Pain Acute  Goal: Optimal Pain Control and Function  Outcome: Progressing  Intervention: Develop Pain Management Plan  Recent Flowsheet Documentation  Taken 9/10/2023 1600 by Adina Marinelli RN  Pain Management Interventions:   medication (see MAR)   emotional support   heat applied   rest   quiet environment facilitated  Taken 9/10/2023 1450 by Adina Marinelli RN  Pain Management Interventions:   medication (see MAR)   emotional support   heat applied   rest   quiet environment facilitated  Taken 9/10/2023 1200 by Adina Marinelli RN  Pain Management Interventions:   medication (see MAR)   emotional support   heat applied   rest   quiet environment facilitated  Intervention: Prevent or Manage Pain  Recent Flowsheet Documentation  Taken 9/10/2023 1600 by Adina Marinelli RN  Medication Review/Management: medications reviewed  Taken 9/10/2023 1200 by Adina Marinelli RN  Medication Review/Management: medications reviewed  Taken 9/10/2023 0800  by Marinelli, Adina, RN  Medication Review/Management: medications reviewed     Problem: Stroke, Ischemic (Includes Transient Ischemic Attack)  Goal: Effective Bowel Elimination  Intervention: Promote Effective Bowel Elimination  Recent Flowsheet Documentation  Taken 9/10/2023 1600 by Adina Marinelli RN  Bowel Elimination Management: hygiene measures promoted  Bowel Program: maintenance program followed  Taken 9/10/2023 1200 by Adina Marinelli RN  Bowel Elimination Management: hygiene measures promoted  Bowel Program: maintenance program followed  Taken 9/10/2023 0800 by Adina Marinelli RN  Bowel Elimination Management: hygiene measures promoted  Bowel Program: maintenance program followed  Goal: Optimal Cerebral Tissue Perfusion  Outcome: Progressing  Goal: Optimal Cognitive Function  Outcome: Progressing  Goal: Optimal Functional Ability  Intervention: Optimize Functional Ability  Recent Flowsheet Documentation  Taken 9/10/2023 1600 by Adina Marinelli RN  Activity Management:   activity adjusted per tolerance   activity encouraged   ambulated in room   ambulated outside room   ambulated to bathroom  Taken 9/10/2023 1200 by Adina Marinelli RN  Activity Management:   activity adjusted per tolerance   activity encouraged  Taken 9/10/2023 1000 by Adina Marinelli RN  Activity Management:   activity adjusted per tolerance   activity encouraged   ambulated in room  Taken 9/10/2023 0800 by Adina Marinelli RN  Activity Management:   activity adjusted per tolerance   activity encouraged  Goal: Effective Oxygenation and Ventilation  Intervention: Optimize Oxygenation and Ventilation  Recent Flowsheet Documentation  Taken 9/10/2023 1600 by Adina Marinelli RN  Head of Bed (HOB) Positioning: HOB at 20-30 degrees  Taken 9/10/2023 1200 by Adina Marinelli RN  Head of Bed (HOB) Positioning: HOB at 20-30 degrees  Taken 9/10/2023 1000 by Adina Marinelli RN  Head of Bed (HOB) Positioning: HOB at 30-45 degrees  Taken 9/10/2023 0800 by Adina Marinelli RN  Head of Bed (HOB)  Positioning: HOB at 20-30 degrees  Goal: Improved Sensorimotor Function  Intervention: Optimize Range of Motion, Motor Control and Function  Recent Flowsheet Documentation  Taken 9/10/2023 1600 by Adina Marinelli RN  Range of Motion: active ROM (range of motion) encouraged  Positioning/Transfer Devices:   pillows   in use  Taken 9/10/2023 1200 by Adina Marinelli RN  Range of Motion: active ROM (range of motion) encouraged  Positioning/Transfer Devices:   pillows   in use  Taken 9/10/2023 1000 by Adina Marinelli RN  Positioning/Transfer Devices:   pillows   in use  Taken 9/10/2023 0800 by Adina Marinelli RN  Range of Motion: active ROM (range of motion) encouraged  Positioning/Transfer Devices:   pillows   in use

## 2023-09-10 NOTE — PROGRESS NOTES
End of shift note 7a-7p:     Events:   -Mag level low (1.8) with am labs and replaced with 400 mg po x 2 doses; Recheck with am labs  -Phos level low (2.3) with am labs and replaced with Neutra-phos, 1 packet q 4 hours x 3 doses; Recheck with am labs  -Recheck K+ with am labs  -GoLYTELY started at about 1700 tonight in prep for colonoscopy Monday at 0900      Neuro: A&Ox4, PERRLA, neuro intact; Pt's not having headaches until this after since tylenol was changed to scheduled, warm packs applied to the back of her neck, and pt has been given prn Atarax more frequently.     Cardiac: SB-SR with 1st AVB with HR 57-70, VSS, afebrile. + pulses & no edema.      Respiratory: Sating 98%-100% on RA, LS-CTA.    GI/: Adequate urine output, brett/clear and getting up to the BR. Pt having her menses today and thus urine is brett, with clots in it.   Pt starting her colonoscopy prep of GoLYTELY at about 1700 today.     Diet/appetite: Tolerating her clear liquid diet and thus has not maintained a fluid restriction well today. Pt will be NPO after midnight  [facility]HS gluc checks: 131, 127, 98    Activity:  Independent up to chair and in halls.      Pain: At acceptable level on current regimen; Using scheduled Tylenol for pain and hot packs to the back of the neck.     Skin: Scattered bruising    LDA's: PIV x 1-SL    Plan: Continue with POC. Notify primary team with changes.    -Colonoscopy to be scheduled for Monday @ 0900  -[facility] teaching to occur on Monday at 1100, per [facility] coordinator (Tom Mauricio)   -Provider from [facility] planning for pt to discharge home Monday afternoon after colonoscopy  -Mammogram, PAP, and dental apt can be completed as outpatient

## 2023-09-11 VITALS
OXYGEN SATURATION: 95 % | DIASTOLIC BLOOD PRESSURE: 80 MMHG | SYSTOLIC BLOOD PRESSURE: 121 MMHG | BODY MASS INDEX: 27.53 KG/M2 | WEIGHT: 175.4 LBS | RESPIRATION RATE: 20 BRPM | HEART RATE: 81 BPM | HEIGHT: 67 IN | TEMPERATURE: 98.4 F

## 2023-09-11 PROBLEM — F41.9 ANXIETY: Chronic | Status: ACTIVE | Noted: 2023-09-11

## 2023-09-11 LAB
ANION GAP SERPL CALCULATED.3IONS-SCNC: 9 MMOL/L (ref 7–15)
BUN SERPL-MCNC: 5.8 MG/DL (ref 6–20)
CALCIUM SERPL-MCNC: 8.9 MG/DL (ref 8.6–10)
CHLORIDE SERPL-SCNC: 110 MMOL/L (ref 98–107)
CREAT SERPL-MCNC: 0.96 MG/DL (ref 0.51–0.95)
DEPRECATED HCO3 PLAS-SCNC: 22 MMOL/L (ref 22–29)
EGFRCR SERPLBLD CKD-EPI 2021: 74 ML/MIN/1.73M2
GLUCOSE BLDC GLUCOMTR-MCNC: 112 MG/DL (ref 70–99)
GLUCOSE BLDC GLUCOMTR-MCNC: 206 MG/DL (ref 70–99)
GLUCOSE SERPL-MCNC: 102 MG/DL (ref 70–99)
MAGNESIUM SERPL-MCNC: 1.8 MG/DL (ref 1.7–2.3)
PHOSPHATE SERPL-MCNC: 2.7 MG/DL (ref 2.5–4.5)
POTASSIUM SERPL-SCNC: 4.2 MMOL/L (ref 3.4–5.3)
SODIUM SERPL-SCNC: 141 MMOL/L (ref 136–145)

## 2023-09-11 PROCEDURE — 80048 BASIC METABOLIC PNL TOTAL CA: CPT

## 2023-09-11 PROCEDURE — 84100 ASSAY OF PHOSPHORUS: CPT | Performed by: INTERNAL MEDICINE

## 2023-09-11 PROCEDURE — 250N000013 HC RX MED GY IP 250 OP 250 PS 637: Performed by: STUDENT IN AN ORGANIZED HEALTH CARE EDUCATION/TRAINING PROGRAM

## 2023-09-11 PROCEDURE — 250N000013 HC RX MED GY IP 250 OP 250 PS 637: Performed by: INTERNAL MEDICINE

## 2023-09-11 PROCEDURE — 99254 IP/OBS CNSLTJ NEW/EST MOD 60: CPT | Performed by: STUDENT IN AN ORGANIZED HEALTH CARE EDUCATION/TRAINING PROGRAM

## 2023-09-11 PROCEDURE — 45380 COLONOSCOPY AND BIOPSY: CPT | Performed by: INTERNAL MEDICINE

## 2023-09-11 PROCEDURE — 250N000011 HC RX IP 250 OP 636: Performed by: INTERNAL MEDICINE

## 2023-09-11 PROCEDURE — 99153 MOD SED SAME PHYS/QHP EA: CPT | Performed by: INTERNAL MEDICINE

## 2023-09-11 PROCEDURE — 0DJD8ZZ INSPECTION OF LOWER INTESTINAL TRACT, VIA NATURAL OR ARTIFICIAL OPENING ENDOSCOPIC: ICD-10-PCS | Performed by: INTERNAL MEDICINE

## 2023-09-11 PROCEDURE — 250N000013 HC RX MED GY IP 250 OP 250 PS 637

## 2023-09-11 PROCEDURE — G0500 MOD SEDAT ENDO SERVICE >5YRS: HCPCS | Performed by: INTERNAL MEDICINE

## 2023-09-11 PROCEDURE — 83735 ASSAY OF MAGNESIUM: CPT | Performed by: INTERNAL MEDICINE

## 2023-09-11 PROCEDURE — 36415 COLL VENOUS BLD VENIPUNCTURE: CPT

## 2023-09-11 RX ORDER — DAPAGLIFLOZIN 10 MG/1
10 TABLET, FILM COATED ORAL EVERY MORNING
Qty: 90 TABLET | Refills: 1 | Status: SHIPPED | OUTPATIENT
Start: 2023-09-12

## 2023-09-11 RX ORDER — TRAZODONE HYDROCHLORIDE 50 MG/1
50 TABLET, FILM COATED ORAL
Qty: 30 TABLET | Refills: 0 | Status: SHIPPED | OUTPATIENT
Start: 2023-09-11

## 2023-09-11 RX ORDER — ACETAMINOPHEN 500 MG
1000 TABLET ORAL 3 TIMES DAILY PRN
Qty: 30 TABLET | Refills: 0 | DISCHARGE
Start: 2023-09-11 | End: 2024-08-08

## 2023-09-11 RX ORDER — NITROGLYCERIN 0.4 MG/1
TABLET SUBLINGUAL
Qty: 30 TABLET | Refills: 0 | Status: SHIPPED | OUTPATIENT
Start: 2023-09-11

## 2023-09-11 RX ORDER — MAGNESIUM SULFATE HEPTAHYDRATE 40 MG/ML
2 INJECTION, SOLUTION INTRAVENOUS ONCE
Status: COMPLETED | OUTPATIENT
Start: 2023-09-11 | End: 2023-09-11

## 2023-09-11 RX ORDER — METOPROLOL SUCCINATE 25 MG/1
25 TABLET, EXTENDED RELEASE ORAL DAILY
Qty: 30 TABLET | Refills: 0 | DISCHARGE
Start: 2023-09-11 | End: 2024-08-08

## 2023-09-11 RX ORDER — HYDROXYZINE HYDROCHLORIDE 25 MG/1
25 TABLET, FILM COATED ORAL EVERY 6 HOURS PRN
Qty: 30 TABLET | Refills: 0 | Status: SHIPPED | OUTPATIENT
Start: 2023-09-11

## 2023-09-11 RX ORDER — TRAZODONE HYDROCHLORIDE 50 MG/1
50 TABLET, FILM COATED ORAL
Qty: 30 TABLET | Refills: 0 | DISCHARGE
Start: 2023-09-11 | End: 2024-08-08

## 2023-09-11 RX ORDER — DAPAGLIFLOZIN 10 MG/1
10 TABLET, FILM COATED ORAL EVERY MORNING
Qty: 90 TABLET | Refills: 1 | DISCHARGE
Start: 2023-09-11 | End: 2024-08-08

## 2023-09-11 RX ORDER — METOPROLOL SUCCINATE 25 MG/1
25 TABLET, EXTENDED RELEASE ORAL DAILY
Qty: 30 TABLET | Refills: 0 | Status: SHIPPED | OUTPATIENT
Start: 2023-09-12

## 2023-09-11 RX ORDER — SPIRONOLACTONE 25 MG/1
25 TABLET ORAL EVERY MORNING
Qty: 30 TABLET | Refills: 0 | DISCHARGE
Start: 2023-09-11 | End: 2024-08-08

## 2023-09-11 RX ORDER — ATORVASTATIN CALCIUM 80 MG/1
80 TABLET, FILM COATED ORAL AT BEDTIME
Qty: 30 TABLET | Refills: 0 | Status: SHIPPED | OUTPATIENT
Start: 2023-09-11

## 2023-09-11 RX ORDER — POTASSIUM CHLORIDE 1500 MG/1
20 TABLET, EXTENDED RELEASE ORAL 2 TIMES DAILY
Qty: 60 TABLET | Refills: 0 | Status: SHIPPED | OUTPATIENT
Start: 2023-09-11

## 2023-09-11 RX ORDER — FENTANYL CITRATE 50 UG/ML
INJECTION, SOLUTION INTRAMUSCULAR; INTRAVENOUS PRN
Status: DISCONTINUED | OUTPATIENT
Start: 2023-09-11 | End: 2023-09-11 | Stop reason: HOSPADM

## 2023-09-11 RX ORDER — HYDROXYZINE HYDROCHLORIDE 25 MG/1
25 TABLET, FILM COATED ORAL EVERY 6 HOURS PRN
Qty: 30 TABLET | Refills: 0 | DISCHARGE
Start: 2023-09-11 | End: 2024-08-08

## 2023-09-11 RX ORDER — FOLIC ACID 1 MG/1
1 TABLET ORAL DAILY
Qty: 30 TABLET | Refills: 0 | Status: SHIPPED | OUTPATIENT
Start: 2023-09-11

## 2023-09-11 RX ORDER — ATORVASTATIN CALCIUM 80 MG/1
80 TABLET, FILM COATED ORAL AT BEDTIME
Qty: 30 TABLET | Refills: 0 | DISCHARGE
Start: 2023-09-11 | End: 2024-08-08

## 2023-09-11 RX ORDER — SPIRONOLACTONE 25 MG/1
25 TABLET ORAL EVERY MORNING
Qty: 30 TABLET | Refills: 0 | Status: SHIPPED | OUTPATIENT
Start: 2023-09-12

## 2023-09-11 RX ADMIN — DAPAGLIFLOZIN 10 MG: 10 TABLET, FILM COATED ORAL at 10:48

## 2023-09-11 RX ADMIN — ACETAMINOPHEN 1000 MG: 500 TABLET ORAL at 10:47

## 2023-09-11 RX ADMIN — POTASSIUM & SODIUM PHOSPHATES POWDER PACK 280-160-250 MG 1 PACKET: 280-160-250 PACK at 12:04

## 2023-09-11 RX ADMIN — SPIRONOLACTONE 25 MG: 25 TABLET ORAL at 10:53

## 2023-09-11 RX ADMIN — HYDROXYZINE HYDROCHLORIDE 25 MG: 25 TABLET, FILM COATED ORAL at 11:03

## 2023-09-11 RX ADMIN — SACUBITRIL AND VALSARTAN 1 TABLET: 49; 51 TABLET, FILM COATED ORAL at 10:48

## 2023-09-11 RX ADMIN — MAGNESIUM SULFATE IN WATER 2 G: 40 INJECTION, SOLUTION INTRAVENOUS at 12:04

## 2023-09-11 RX ADMIN — METOPROLOL SUCCINATE 25 MG: 25 TABLET, EXTENDED RELEASE ORAL at 10:48

## 2023-09-11 RX ADMIN — SERTRALINE HYDROCHLORIDE 150 MG: 100 TABLET ORAL at 11:02

## 2023-09-11 ASSESSMENT — ACTIVITIES OF DAILY LIVING (ADL)
ADLS_ACUITY_SCORE: 20

## 2023-09-11 NOTE — DISCHARGE SUMMARY
[facility]    Cardiology Discharge Summary- Cardiology         Date of Admission:  8/31/2023  Date of Discharge:  9/11/2023  2:50 PM  Discharging Provider: Dr Mickie Owens MD      Discharge Diagnoses   #HFrEF (LVEF 18%), non-ischemic cardiomyopathy (genetic) s/p ICD  #HTN   #HLD   ACC/AHA Stage D, NYHA Symptom Class IV  #Nausea - resolved   #Aphasia - improved   #Right-sided numbness and weakness - improved   #Type 2 DM     Follow-ups Needed After Discharge   Follow-up Appointments     Adult [facility] Follow-up and recommended labs and tests      Follow up with primary care provider, Ami Serrano, within 7 days to   evaluate medication change and for hospital follow- up.  Follow up with Dr. Dr Sneed , at [facility]  to evaluate   medication change.   Follow up with Dr Saucedo   Appointments on [place] and/or [facility] (with [facility] or [facility]   provider or service). Call [phone] if you haven't heard regarding   these appointments within 7 days of discharge.              Discharge Disposition   Discharged to home  Condition at discharge: Stable    Hospital Course      #HFrEF (LVEF 18%), non-ischemic cardiomyopathy (genetic) s/p ICD  #HTN   #HLD   ACC/AHA Stage D, NYHA Symptom Class IV  Patient with progressively worsening dyspnea, edema, overall functional status over the last ~ 2 years. 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 Has been on diuretics, GDMT, has ICD in place.      Primary Cardiologist: Dr. Saucedo Last seen 12/06/2022   Ischemic Eval: Coronary angiogram 12/24/2020 with no significant coronary artery disease, elevated LVEDP 24 mmHg   RHC 08/31/2023: RA 9; PA 41/22/30; Amina CO/CI 3.04/1.61   CMR 08/31/2023: Severely dilated LV with global systolic function severely reduced, LVEF 18%, severe diffuse hypokinesis, RV with normal cavity size and normal global systolic function, RVEF  53%  Cardiopulmonary Stress Test 09/01: Peak VO2 12.70 ml/kg/min; VE/VCO2 59.11; RER 0.99   RHC 09/06/23:  RA 6; PA 34/14/20; Amina CO/CI 5.35/2.82     Volume: Euvolemic   Diuretic: PTA Torsemide 100 mg BID (held since 09/03/23, Remains Euvolemic but will continue on discharge)  ACE-I/ARB/ARNi: Entresto 49/51 BID   BB: Metoprolol XL 25 mg (09/07/23- present)  Aldosterone antagonist: Spironolactone 25 mg (09/07/23)  SGLT2i: Dapagliflozin 10 mg   Hydral/Nitrates: hydralazine 25 TID held since 09/06/23   SCD prophylaxis: ICD 11/2022   Statin: Atorvastatin 80 mg        DATE MAP CVP PAP PCWP Amina CO Amina CI SVR MVo2 Therapies Weight    09/06/23  6 34/14 14 5.35 2.82       08/31/2023   9 41/22/30 19 3.04 1.61       172 lbs    10/2022   12 76/35/50 38 3.25 1.72       169 lbs      LVAD/Transplant Evaluation Checklist  [x] Labs (CBC, CMP, PT/INR, cystatin C, prealbumin, UA + micro) Cystatin 1.6  [x] Infectious (Hep A/B/C, HIV, treponema, HSV 1/2 IgG, CMV IgG, Toxo IgG, EBV IgG, varicella IgG, Quant gold, COVID vaccine/PCR)  [x] Utox/nicotine and cotinine/PeTH   [x] Immunocompatibility (last transfusion, ABO, HLA tissue typing, PRA)  [x] ECG, Echo - 08/31/2023: Severely dilated LV with global systolic function severely reduced, LVEF 18%, severe diffuse hypokinesis, RV with normal cavity size and normal global systolic function, RVEF 53%  [x] CPX 9/1/2023- Peak VO2 12.70 ml/kg/min; VE/VCO2 59.11; RER 0.99   [x] 6MWT   [x] RHC - 8/31/2023: RA 9; PA 41/22/30; Amina CO/CI 3.04/1.61   [x] CVTS consult  [x] Social work consult- conditional candidate for advanced therapies. In urgent situation, writer would favor LVAD. Concerns with heart transplant due to fairly recent meth use.   [x] Palliative care consult   [x] Neuropsych consult  [x] Nutrition consult  [x] CT Dental   [] Dental consult - Outpatient follow up with patient's dentist  [x] Abd US + doppler  [x] Extremity US and ABIs  [x] Carotid US (if DM or ICM or >49yo)  [x]  PFTs  [] Dexa - may be ordered outpatient   [x] CT head non-contrast  [x] CT CAP non-contrast -  Sub-6 mm pulmonary nodules. Optional follow-up CT of the chest in  one year, if patient is high risk per Fleischner criteria  [x] colonoscopy (>51 yo) - 09/11/23 no polyps seen   [x] HCG  [] mammogram (>41 yo) will be done outpatient   [] Pap test - will be completed in outpatient setting with the PCP        #Nausea - resolved   #Aphasia - improved   #Right-sided numbness and weakness - improved   On 09/02, she developed acute-onset expressive aphasia and right-sided weakness/numbness, CTH was negative for acute bleeding, but did show an area of hypodensity in the left parietal lobe consistent with old stroke. CTA was negative for LVO. Given a clinical syndrome consistent with acute ischemic stroke localizing to the left frontal and parietal lobes, and an absence of contraindications to thrombolytic therapy, tenecteplase (TNK) was administered. She was transferred to the neurocritical care unit for further management. Similar episode 09/03 where she was unable to articulate her name or date of birth with slow and slurred speech and word finding difficulty. Throughout the episode, was noted to have purposeful, strong, equal strength bilaterally. Repeat Head CT without evidence of intracranial hemorrhage, stable appearance of patchy hypoattenuation of the left parietal periventricular matter.  Seen by psych 09/04/23 noted to have some component of conversion disorder.   - seen by neurology and psychiatry  - MRI scan 09/05/23 negative for stroke  - Increased sertraline to 150 mg, trazodone 50 mg at bedtime    - will hold on starting Plavix at this time.      #Type 2 DM   Most recent Hgb A1c 7.8 on 08/10/2023  - Detemir 40 U at bedtime  - MSSI         #Sub-6cm pulmonary nodules, incidental finding on CT chest 9/2023   - Optional follow-up CT of the chest in one year, if patient is high risk per Fleischner criteria        Consultations This Hospital Stay   OCCUPATIONAL THERAPY ADULT IP CONSULT  NUTRITION SERVICES ADULT IP CONSULT  CARDIOTHORACIC SURGERY ADULT IP CONSULT  SOCIAL WORK IP CONSULT  NEUROPSYCHOLOGY IP CONSULT  NUTRITION SERVICES ADULT IP CONSULT  CORE CLINIC EVALUATION IP CONSULT  PALLIATIVE CARE ADULT IP CONSULT  DENTISTRY ADULT IP CONSULT  ORAL MAXILLOFACIAL SURGERY ADULT IP CONSULT  DENTAL HYGIENE IP ADULT CONSULT - UU  OCCUPATIONAL THERAPY ADULT IP CONSULT  PATIENT LEARNING CENTER IP CONSULT  SPEECH LANGUAGE PATH ADULT IP CONSULT  PHARMACY IP CONSULT  PHARMACY IP CONSULT  PHARMACY IP CONSULT  PHYSICAL THERAPY ADULT IP CONSULT  OCCUPATIONAL THERAPY ADULT IP CONSULT  REHAB ADMISSIONS LIAISON IP CONSULT  CARE MANAGEMENT / SOCIAL WORK IP CONSULT  PSYCHIATRY IP CONSULT  CARE MANAGEMENT / SOCIAL WORK IP CONSULT  NURSING TO CONSULT FOR VASCULAR ACCESS CARE IP CONSULT  GI LUMINAL ADULT IP CONSULT  SMOKING CESSATION PROGRAM IP CONSULT  SMOKING CESSATION PROGRAM IP CONSULT    Code Status   Full Code        Rosa Maria Dunbar MD  [facility]  ______________________________________________________________________    Physical Exam   Vital Signs: Temp: 98.4  F (36.9  C) Temp src: Oral BP: 121/80 Pulse: 81   Resp: 20 SpO2: 95 % O2 Device: None (Room air) Oxygen Delivery: 2 LPM  Weight: 175 lbs 6.4 oz    GEN: No acute distress   HEENT: EOMI, no icterus, moist mucous membranes   CV: RRR, normal s1/s2, no murmurs. JVP ~8cm   CHEST: Normal work of breathing. Lungs CTAB, no wheezes or crackles.   ABD: Soft, Non-tender.  no abdominal distension  EXT: Warm and well-perfused, trace edema  NEURO: Awake, alert, answering questions appropriately, motor grossly nonfocal  PSYCH: cooperative, affect appropriate         Primary Care Physician   Ami Serrano    Discharge Orders      Follow-Up with Cardiology JOVANY Heart Failure Discharge      Reason for your hospital stay    You were admitted for  evaluation and consideration of advanced heart failure therapy. There were concerns for strokes however work up was completed with no concerns of stroke. You were also evaluated for concerns of anxiety for which medication adjustments were made. You completed most of the evaluation with close follow up with dental and PCP as well as Dr Sneed.     Activity    Your activity upon discharge: activity as tolerated     Adult [facility] Follow-up and recommended labs and tests    Follow up with primary care provider, Ami Serrano, within 7 days to evaluate medication change and for hospital follow- up.  Follow up with Dr. Dr Sneed , at [facility]  to evaluate medication change.   Follow up with Dr Saucedo   Appointments on [place] and/or [facility] (with [facility] or [facility] provider or service). Call [phone] if you haven't heard regarding these appointments within 7 days of discharge.     Full Code     Diet    Follow this diet upon discharge: Orders Placed This Encounter      Fluid restriction 2000 ML FLUID      Snacks/Supplements Adult: Other; If pt asks for a snack or supplement, please send; With Meals      2 Gram Sodium Diet       Significant Results and Procedures   Results for orders placed or performed during the hospital encounter of 08/31/23   XR Chest Port 1 View    Narrative    XR CHEST PORT 1 VIEW  8/31/2023 10:01 PM     HISTORY:  46 y/o F s/p RHC complaining of chest pain and some SOB, r/o  pneumothorax       COMPARISON:  8/31/2023    FINDINGS:   Frontal view of the chest. Left chest wall implantable cardiac  defibrillator.     Stable prominent cardiac silhouette. Minimal bibasilar streaky  opacities. No focal consolidation, pleural effusion or pneumothorax.      Impression    IMPRESSION: Minimal bibasilar atelectasis. No focal consolidation. No  pneumothorax.    I have personally reviewed the examination and initial interpretation  and I agree with the findings.    BERTHA GILL MD          SYSTEM ID:  [id number]   X-ray Chest 2 vws*    Narrative    Chest 2 views    INDICATION: Heart transplant candidate evaluation    COMPARISON: Portable radiograph yesterday    FINDINGS: Heart size and shape appear normal. Loss including  transvenous approach implantable cardiac defibrillator present grossly  unchanged in position allowing for differences in technique and  positioning. No consolidation, effusion or suspicious nodularity. Bony  structures appear unremarkable.      Impression    IMPRESSION: Implantable cardiac defibrillator.    SHAYNA FREDERICK MD         SYSTEM ID:  [id number]   US carotid bilateral    Narrative    BILATERAL CAROTID DUPLEX DOPPLER ULTRASOUND 9/1/2023 2:33 PM    CLINICAL HISTORY: Heart Transplant Candidate Evaluation. Assess  vasculopathy, Heart Transplant Candidate Evaluation. Assess  vasculopathy    COMPARISON: None available.    REFERRING PROVIDER: EFRAIN BONILLA    TECHNIQUE: Grayscale (B-mode) and duplex and spectral Doppler  ultrasound of the common carotid, extracranial internal carotid,  external carotid, and vertebral artery origins. Velocity measurements  obtained with angle correction at or less than 60 degrees.    FINDINGS:    RIGHT SIDE:     Plaque Morphology: Mixed echogenicity plaque causes less than 50%  diameter stenosis.       Proximal CCA: 72/22 cm/s     Mid CCA: 83/25 cm/s     Distal CCA: 78/26 cm/s           External CA: 61/11 cm/s       Proximal ICA: 60/24 cm/s, 69/31 cm/s     Mid ICA: 77/37 cm/s     Distal ICA: 92/46 cm/s       Vertebral artery: Antegrade: 45/18 cm/s     ICA/CCA ratio: 1.18     LEFT SIDE:     Plaque Morphology: Minimal plaque        Proximal CCA: 90/27 cm/s     Mid CCA: 86/32 cm/s     Distal CCA: 81/31 cm/s           External CA: 83/21 cm/s       Proximal ICA: 51/26 cm/s     Mid ICA: 73/28 cm/s     Distal ICA: 104/51 cm/s       Vertebral artery: Antegrade: 55/20 cm/s     ICA/CCA ratio: 1.28       Impression    IMPRESSION:    1. RIGHT ICA: Less than  50% diameter narrowing by grayscale imaging  and sonographic velocity criteria.    2. LEFT ICA:  Less than 50% diameter narrowing by grayscale imaging  and sonographic velocity criteria.    Intersocietal Accreditation Commission Updated Recommendations for  Carotid Stenosis Interpretation Criteria - October 2021.  https://intersocietal.org/wp-content/uploads/2021/10/IAC-Vascular-Test  aq-Lppnrruntjldf_Yhpzost-Ikmomaolncrafji-for-Carotid-Stenosis-Interpre  ation-Criteria.pdf    Greater than 70% diameter stenosis criteria per - Journal of Vascular  Surgery Vol. 75 Issue 1 Supplement [id number] Published online: June 18, 2021          Normal            ICA PSV: < 180 cm/s            Plaque Estimate: None            ICA/CCA PSV Ratio: < 2.0            ICA EDV: < 40 cm/s         < 50%            ICA PSV: < 180 cm/s            Plaque Estimate: < 50%            ICA/CCA PSV Ratio: < 2.0            ICA EDV: < 40 cm/s         50 - 69%            ICA PSV: 180 - 230 cm/s            Plaque Estimate: > 50%            ICA/CCA PSV Ratio: 2.0 - 4.0            ICA EDV: 40 - 100 cm/s         > 70% but less than near occlusion            ICA PSV: > 230 cm/s            Plaque Estimate: > 50%            AND either            ICA EDV: > 100 cm/s            or            ICA/CCA PSV Ratio: > 4.0         Near occlusion            ICA PSV: > 230 cm/s            Plaque Estimate: Visible            ICA/CCA PSV Ratio: Variable            ICA EDV: Variable         Total occlusion            ICA PSV: Undetectable            Plaque Estimate: Visible, no detectable lumen            ICA/CCA PSV Ratio: N/A            ICA EDV: N/A                                          Additional criteria from vascular surgery     > 80%       EDV > 120 cm/s    AIYANA RUBIO MD         SYSTEM ID:  [id number]   US Lower Extremity Arterial Duplex Bilateral    Narrative    ULTRASOUND LOWER EXTREMITY ARTERIAL DUPLEX BILATERAL 9/1/2023 2:54 PM    CLINICAL HISTORY: Heart  transplant candidate. Preoperative evaluation.    COMPARISONS: None available.    REFERRING PROVIDER: EFRAIN BONILLA    TECHNIQUE: Bilateral leg arteries evaluated with color Doppler and  spectral pulsed wave Doppler ultrasound.    FINDINGS:   RIGHT:  Common femoral artery: 111/0 cm/s, triphasic  Profundus femoral artery: 88/0 cm/s, triphasic    Superficial femoral artery, origin: 95/0 cm/s, triphasic  Superficial femoral artery, mid thigh: 109/0 cm/s, triphasic  Superficial femoral artery, distal thigh: 104/0 cm/s, triphasic    Popliteal artery: 62/0 cm/s, triphasic    Posterior tibial artery, ankle: 106/0 cm/s, triphasic  Anterior tibial artery, ankle: 85/0 cm/s, triphasic    LEFT:  Common femoral artery: 82/0 cm/s, triphasic  Profundus femoral artery: 67/0 cm/s, triphasic    Superficial femoral artery, origin: 98/0 cm/s, triphasic  Superficial femoral artery, mid thigh: 85/0 cm/s, triphasic  Superficial femoral artery, distal thigh: 61/0 cm/s, triphasic    Popliteal artery: 65/0 cm/s, triphasic    Posterior tibial artery, ankle: 81/0 cm/s, triphasic  Anterior tibial artery, ankle: 76/0 cm/s, triphasic      Impression    IMPRESSION: Negative study.    AIYANA RUBIO MD         SYSTEM ID:  [id number]   US Lower Extremity Venous Duplex Bilateral    Narrative    ULTRASOUND LOWER EXTREMITY VENOUS DUPLEX BILATERAL 9/1/2023 2:45 PM    CLINICAL HISTORY: History of deep venous thrombosis.      COMPARISONS: None available.    REFERRING PROVIDER: EFRAIN BONILLA    TECHNIQUE: Grayscale, color Doppler, Doppler waveform ultrasound  evaluation was performed through the bilateral common femoral,  femoral, and popliteal veins. Bilateral posterior tibial and peroneal  veins were evaluated with grayscale imaging and compression.    FINDINGS: Right common femoral, femoral, and popliteal veins are fully  compressible, patent, and demonstrate normal phasic Doppler waveforms.    Right posterior tibial veins are fully compressible to the  "ankle.    Right peroneal veins are fully compressible to the distal calf.    Left common femoral, femoral, and popliteal veins are fully  compressible, patent, and demonstrate normal phasic Doppler waveforms.    Left posterior tibial veins are fully compressible to the ankle.    Left peroneal veins are fully compressible to the distal calf.      Impression    IMPRESSION: No deep venous thrombosis demonstrated in either leg.    Reference: \"Duplex Ultrasound in the Diagnosis of Lower-Extremity Deep  Venous Thrombosis\"- Neli Bowles MD, S; Johny Frey MD  (Circulation. 2014;129:917-921. http://circ.ahajournals.org)    AIYANA RUBIO MD         SYSTEM ID:  [id number]   US MARLENE Doppler No Exercise    Narrative    MARLENE 9/1/2023 3:27 PM    CLINICAL HISTORY: Heart transplant evaluation and/or Ventricular  Assist Device (VAD) planning.     COMPARISONS: Ultrasound lower extremity arterial duplex bilateral  9/1/2023    REFERRING PROVIDER: DELFINA COOPER    TECHNIQUE: Bilateral MARLENE obtained.    FINDINGS:  RIGHT:       Brachial: 136 mmHg       Ankle (PT): 160 mmHg       Ankle (DP): 157 mmHg         MARLENE: 1.18    LEFT:       Brachial: 134 mmHg       Ankle (PT): 137 mmHg       Ankle (DP): 151 mmHg         MARLENE: 1.11      Impression    IMPRESSION:  1. RIGHT:       A. Resting MARLENE is normal, 1.18.    2. LEFT:       A. Resting MARLENE is normal, 1.11.    Guidelines:    MARLENE Diagnostic Criteria (Based on criteria published in Circulation  2011; 124: 5453-6766):    > 1.4: Non compressible    1.00 - 1.40: Normal    0.91 - 0.99: Borderline    At or below 0.90: Abnormal    MARLENE Diagnostic Criteria (Based on ACC/AHA guideline 2008):    >/=1.3 - non compressible vessels    1.00  -1.29 - Normal    0.91 - 0.99 - Borderline    0.41 - 0.90 - Mild to moderate PAD    0.00 - 0.40 - Severe PAD    AIYANA RUBIO MD         SYSTEM ID:  [id number]   CT Head w/o Contrast    Narrative    CT HEAD W/O CONTRAST 9/1/2023 5:36 PM    Provided History: heart transplant " evaluation  ICD-10:    Comparison: None.    Technique: Using multidetector thin collimation helical acquisition  technique, axial, coronal and sagittal CT images from the skull base  to the vertex were obtained without intravenous contrast.     Findings:    No intracranial hemorrhage, mass effect, or midline shift. The  ventricles are proportionate to the cerebral sulci. The gray to white  matter differentiation of the cerebral hemispheres is preserved. The  basal cisterns are patent. Left parietal subcortical patchy  hypoattenuation, indeterminant but most likely represents gliosis from  underlying chronic small vessel disease.    The visualized paranasal sinuses are clear. The mastoid air cells are  clear.       Impression    Impression: No suspicious intracranial finding. Subtle patchy  hypodensities in the left parietal subcortical white matter, likely  related to gliosis from underlying chronic small vessel disease.    JACOB BARTH MD         SYSTEM ID:  [id number]   CT Chest Abdomen Pelvis w/o Contrast    Narrative    EXAM: CT CHEST ABDOMEN PELVIS W/O CONTRAST, 9/1/2023 5:37 PM    TECHNIQUE:  Helical CT images from the lung bases through the  symphysis pubis were obtained with out intravenous contrast. Coronal  and sagittal reformatted images were generated at a workstation for  further assessment.    HISTORY: heart transplant evaluation    COMPARISON: Same day chest radiograph    FINDINGS:     Chest:   Left chest wall implantable cardiac defibrillator with lead in the  right ventricle.  Central tracheobronchial tree is patent. There is a 3 mm solid nodule  of the left lower lobe (series 5, image 226) and 2 mm groundglass  nodule of the right lower lobe (series 5, image 216).No pleural  effusion or pneumothorax. Heart is normal size without pericardial  effusion.  No central pulmonary embolism. Non-aneurysmal thoracic  aorta. No thoracic lymphadenopathy.    Abdomen/Pelvis:  No suspicious liver lesions. Patent  hepatic vasculature. . No  gallstones or evidence of acute cholecystitis. No suspicious  pancreatic lesions. Pancreatic duct is not dilated. Spleen is within  normal limits. 5 mm fat-containing nodule of the left adrenal gland  compatible with an adenoma. Right adrenal gland is within normal  limits.     No hydronephrosis or obstructing renal stones. No kidney masses.  Urinary bladder is unremarkable.  Pelvic organs are unremarkable.    No abnormally thickened or dilated bowel. Appendix is surgically  absent. No free fluid or air within the abdomen.    No infrarenal aortic aneurysm. No pathologically enlarged  retroperitoneal, mesenteric, or pelvic lymph nodes.    Bones / Soft Tissues:   No suspicious lesions or acute abnormalities.      Impression    IMPRESSION:   1. No acute or suspicious abnormalities in the chest, abdomen, or  pelvis.  2. Sub-6 mm pulmonary nodules. Optional follow-up CT of the chest in  one year, if patient is high risk per Fleischner criteria    I have personally reviewed the examination and initial interpretation  and I agree with the findings.    MIGUE SCHMIDT DO         SYSTEM ID:  [id number]   CT Dental wo Contrast    Narrative    CT DENTAL WO CONTRAST 9/1/2023 5:35 PM    History:  heart transplant evaluation    Comparison:    TECHNIQUE: 3-D reconstruction by the technologists, with curved  multiplanar reformat of thin section imaging through the mandible and  maxilla obtained without intravenous contrast.    FINDINGS: Multifocal scattered dental caries and dental  reconstruction. Bilateral mandibular unerupted third molar teeth.  Second and third maxillary molar teeth are removed. Bilateral  mandibular second molar and mandibular second premolar teeth. Most  notably there is a large cavity in the left maxillary first premolar  tooth with periapical lucency and dehiscence of the adjacent outer  maxillary cortical bone. No significant soft tissue swelling or mass.  Normal facial bone  alignment. Mucosal thickening in the floor of right  maxillary sinus. Temporomandibular joints are grossly normal.      Impression    IMPRESSION:Multifocal scattered dental caries and dental  reconstruction. Most notably there is a large cavity in the left  maxillary first premolar tooth, associated with periapical lucency and  dehiscence of the adjacent outer maxillary cortical bone, compatible  with periapical periodontitis.    JACOB BARTH MD         SYSTEM ID:  [id number]   US Upper Ext Arterial Duplex Bilateral    Narrative    ULTRASOUND UPPER EXTREMITY ARTERIAL DUPLEX BILATERAL  9/1/2023 2:50 PM    CLINICAL HISTORY: Potential balloon pump access.     COMPARISONS: None available.    REFERRING PROVIDER: EFRAIN BONILLA    TECHNIQUE: Bilateral subclavian and axillary arteries evaluated with  grayscale, color Doppler, and spectral pulsed wave Doppler ultrasound.    FINDINGS:   RIGHT:  Subclavian artery, medial: 64/0 cm/s, triphasic, 10.6 mm  Subclavian artery, mid: 62/0 cm/s, triphasic, 8.7 mm  Subclavian artery, lateral: 62/0 cm/s, triphasic, 9.1 mm    Axillary artery: 56/0 cm/s, triphasic, 8.2 mm    LEFT:  Subclavian artery, medial: 76/0 cm/s, triphasic, 8.5 mm  Subclavian artery, mid: 105/0 cm/s, triphasic, 8.6 mm  Subclavian artery, lateral: 71/0 cm/s, triphasic, 7.9 mm    Axillary artery: 69/0 cm/s, triphasic, 8.1 mm      Impression    IMPRESSION: Patent bilateral subclavian and axillary arteries with  measurements as in the report.    AIYANA RUBIO MD         SYSTEM ID:  [id number]   US Upper Extremity Venous Duplex Bilat    Narrative    ULTRASOUND UPPER EXTREMITY VENOUS DUPLEX BILATERAL 9/1/2023 2:48 PM    CLINICAL HISTORY: LVAD planning.     COMPARISONS: None available.    REFERRING PROVIDER: EFRAIN BONILLA    TECHNIQUE: Bilateral internal jugular veins evaluated with grayscale,  color Doppler, and spectral pulsed wave Doppler ultrasound. Bilateral  innominate, subclavian, and axillary veins evaluated with  color  Doppler and spectral pulsed wave Doppler ultrasound.    FINDINGS: Right internal jugular vein is fully compressible with a  phasic waveform.    Right innominate, subclavian, and axillary veins fill [id number] in  color Doppler images with phasic waveforms.    Left internal jugular vein is fully compressible with a phasic  waveform.    Left innominate, subclavian, and axillary veins fill [id number] in  color Doppler images with phasic waveforms.      Impression    IMPRESSION: No central deep venous thrombosis demonstrated in either  arm.    I have personally reviewed the examination and initial interpretation  and I agree with the findings.    AIYANA RUBIO MD         SYSTEM ID:  [id number]   US Abdomen Complete    Narrative    Examination: Ultrasound abdomen complete (09/02/2023)    Indication: Heart transplant evaluation and/or ventricular assist  device planning.    Comparison: CT 9/1/2023.    Findings:  Liver: Hepatic parenchymal echogenicity is within normal limits. No  liver lesions/masses. No intrahepatic biliary dilatation. Main portal  vein is patent with anterograde flow. Liver length measures 15.3 cm,  not enlarged.    Pancreas: Visualized portions are unremarkable.    Gallbladder: Echogenic/shadowing stones or sludge. Normal wall  thickness (2 mm). No pericholecystic fluid.    CBD: Measures approximately 3 mm, nondilated.    Right kidney: 10.2 cm in length. Normal cortical thickness. No  hydronephrosis.    Left kidney: 10.2 cm in length. Normal cortical thickness. No  hydronephrosis.    Ascites: None.      Impression    Impression:  1. Unremarkable abdominal ultrasound.    I have personally reviewed the examination and initial interpretation  and I agree with the findings.    MIGUE SCHMIDT DO         SYSTEM ID:  [id number]   CT Head w/o Contrast     Value    Radiologist flags (AA)    Narrative    CT HEAD W/O CONTRAST, CTA HEAD NECK W CONTRAST 9/2/2023 11:26 AM    CT Head without contrast  CT Angiogram  of the Neck with contrast   CT Angiogram of the Head with contrast    History:  Aphasia, RUE weakness, stroke code    Comparison: CT head.     Technique:   HEAD CT: Using multidetector thin collimation helical acquisition  technique, axial, coronal and sagittal CT images were obtained from  the base of the skull to the vertex without intravenous contrast and  reviewed in brain, bone, and subdural windows.     HEAD and NECK CTA: Thereafter, postcontrast images were obtained from  the aortic arch through the [place].  Axial images were  obtained using thin collimation multidetector helical technique. This  CT angiogram data was reconstructed at thin intervals with mild  overlap. Images were sent to the [employer] workstation, and 3D  reconstructions and multiplanar reformations were obtained with  reconstructions performed by the technologist and the radiologist. The  source images, multiplanar reformations, 3D reconstructions in both  maximum intensity projection display and volume rendered models were  reviewed,     Contrast: Isovue 370    Findings:   Head CT: There is no evidence of intracranial hemorrhage, mass effect,  or midline shift. Gray/white matter differentiation in both cerebral  hemispheres is preserved. There is mild patchy low attenuation within  the left parietal white matter. The ventricles and sulci are enlarged  but within normal limits for the patient's age, and the ventricles are  not out of proportion to the sulci.     The mastoid air cells are clear. Mild mucosal thickening right  maxillary sinus. Prior left globe surgery for retinal detachment.    CT Perfusion: Images documenting relative cerebral blood volume,  cerebral blood flow and mean transit time demonstrate no evidence of  ischemia or acute infarction. There is normal perfusion of the brain  at the visualized levels.    Head CTA demonstrates no aneurysm or stenosis of the major  intracranial arteries. The anterior communicating  artery is patent.  The posterior communicating arteries are patent bilaterally.     Neck CTA demonstrates no stenosis of the major cervical arteries on  either side. The origins of the great vessels from the aortic arch are  patent. The normal distal right internal carotid artery measures 5 mm.  The normal distal left internal carotid artery measures 5 mm. Mild  atherosclerotic calcifications of bilateral carotid bifurcations.    Multiple subcentimeter thyroid nodules, no specific follow-up  required. Left implantable cardiac device.      Impression    Impression:   1. No evidence of intracranial hemorrhage.  2. Head CTA demonstrates no aneurysm or stenosis of the major  intracranial arteries.   3. Neck CTA demonstrates no stenosis of the major cervical arteries.  Mild atherosclerotic changes of bilateral carotid bifurcations.  4. Mild patchy low attenuation within the left parietal white matter;   stable from yesterday's study; this might represent old infarct or  gliotic changes. However may consider MRI to exclude acute infarct  given the limitations of CT.       [Critical Result:     Finding was identified on 9/2/2023 11:41 AM.     MEHRAN Perez and stroke team ] were contacted by Dr. Brewster at 9/2/2023  11:48 AM and verbalized understanding of the critical finding.     I have personally reviewed the examination and initial interpretation  and I agree with the findings.    JACOB BARTH MD         SYSTEM ID:  [id number]   CTA Head Neck w Contrast     Value    Radiologist flags (AA)    Narrative    CT HEAD W/O CONTRAST, CTA HEAD NECK W CONTRAST 9/2/2023 11:26 AM    CT Head without contrast  CT Angiogram of the Neck with contrast   CT Angiogram of the Head with contrast    History:  Aphasia, RUE weakness, stroke code    Comparison: CT head.     Technique:   HEAD CT: Using multidetector thin collimation helical acquisition  technique, axial, coronal and sagittal CT images were obtained from  the base of the skull to  the vertex without intravenous contrast and  reviewed in brain, bone, and subdural windows.     HEAD and NECK CTA: Thereafter, postcontrast images were obtained from  the aortic arch through the [place].  Axial images were  obtained using thin collimation multidetector helical technique. This  CT angiogram data was reconstructed at thin intervals with mild  overlap. Images were sent to the [employer] workstation, and 3D  reconstructions and multiplanar reformations were obtained with  reconstructions performed by the technologist and the radiologist. The  source images, multiplanar reformations, 3D reconstructions in both  maximum intensity projection display and volume rendered models were  reviewed,     Contrast: Isovue 370    Findings:   Head CT: There is no evidence of intracranial hemorrhage, mass effect,  or midline shift. Gray/white matter differentiation in both cerebral  hemispheres is preserved. There is mild patchy low attenuation within  the left parietal white matter. The ventricles and sulci are enlarged  but within normal limits for the patient's age, and the ventricles are  not out of proportion to the sulci.     The mastoid air cells are clear. Mild mucosal thickening right  maxillary sinus. Prior left globe surgery for retinal detachment.    CT Perfusion: Images documenting relative cerebral blood volume,  cerebral blood flow and mean transit time demonstrate no evidence of  ischemia or acute infarction. There is normal perfusion of the brain  at the visualized levels.    Head CTA demonstrates no aneurysm or stenosis of the major  intracranial arteries. The anterior communicating artery is patent.  The posterior communicating arteries are patent bilaterally.     Neck CTA demonstrates no stenosis of the major cervical arteries on  either side. The origins of the great vessels from the aortic arch are  patent. The normal distal right internal carotid artery measures 5 mm.  The normal distal left  internal carotid artery measures 5 mm. Mild  atherosclerotic calcifications of bilateral carotid bifurcations.    Multiple subcentimeter thyroid nodules, no specific follow-up  required. Left implantable cardiac device.      Impression    Impression:   1. No evidence of intracranial hemorrhage.  2. Head CTA demonstrates no aneurysm or stenosis of the major  intracranial arteries.   3. Neck CTA demonstrates no stenosis of the major cervical arteries.  Mild atherosclerotic changes of bilateral carotid bifurcations.  4. Mild patchy low attenuation within the left parietal white matter;   stable from yesterday's study; this might represent old infarct or  gliotic changes. However may consider MRI to exclude acute infarct  given the limitations of CT.       [Critical Result:     Finding was identified on 9/2/2023 11:41 AM.     MEHRAN Perez and stroke team ] were contacted by Dr. Brewster at 9/2/2023  11:48 AM and verbalized understanding of the critical finding.     I have personally reviewed the examination and initial interpretation  and I agree with the findings.    JACOB BARTH MD         SYSTEM ID:  [id number]   CT Head w/o Contrast    Narrative    EXAM: CT HEAD W/O CONTRAST  9/3/2023 10:50 AM     HISTORY: acute onset aphasia s/p TNK on 9/2       COMPARISON: 9/2/2023    TECHNIQUE: Using multidetector thin collimation helical acquisition  technique, axial, coronal and sagittal CT images from the skull base  to the vertex were obtained without intravenous contrast.   (topogram) image(s) also obtained and reviewed.    FINDINGS:  No acute intracranial hemorrhage, mass effect, or midline shift. No  acute loss of gray-white matter differentiation in the cerebral  hemispheres. Mild patchy hypoattenuation of the left parietal  periventricular white matter, similar to prior. Ventricles are  proportionate to the cerebral sulci. Clear basal cisterns.    The bony calvaria and the bones of the skull base are normal.  The  visualized portions of the paranasal sinuses and mastoid air cells are  clear. Grossly normal orbits.       Impression    IMPRESSION:   1. No CT evidence of intracranial hemorrhage.  2. Stable appearance of patchy hypoattenuation of the left parietal  periventricular white matter.     I have personally reviewed the examination and initial interpretation  and I agree with the findings.    JACOB BARTH MD         SYSTEM ID:  [id number]   MR Brain w/o Contrast    Narrative    EXAM: MR BRAIN W/O CONTRAST  9/5/2023 2:33 PM     HISTORY: Acute ischemic stroke, ICD in place       COMPARISON: CT 9/3/2023, 9/2/2023    TECHNIQUE: Sagittal T1-weighted, coronal T2-weighted, axial T2 FLAIR,  axial susceptibility weighted, and axial diffusion-weighted with ADC  map images of the brain were obtained without intravenous contrast    CONTRAST: None.    FINDINGS:  There is no mass effect, midline shift, or intracranial hemorrhage.  Scattered T2/FLAIR hyperintensities in white matter of the bilateral  cervical cortices. The ventricles are proportionate to the cerebral  sulci. Diffusion and susceptibility weighted images are negative for  acute/focal abnormality. Major intracranial vascular structures are  within normal limits.    No suspicious abnormality of the skull marrow signal. Small amount of  fluid in the mastoid air cells. Mastoid air cells are clear. No focal  abnormality of the pituitary gland, sella, skull base and upper  cervical spinal structures on sagittal images. The orbits are normal.      Impression    IMPRESSION:  1. No MRI evidence of acute intracranial pathology.  2. Nonspecific scattered T2/FLAIR hyperintensities, differential  includes sequelae of prior therapy versus sequela of infectious,  inflammatory, or vasculopathic process.    I have personally reviewed the examination and initial interpretation  and I agree with the findings.    CHRIS GERARDO MD         SYSTEM ID:  [id number]   Cardiopulmonary Stress  Test- Adult     Value    Target     Baseline /90    Baseline HR 78    Rest /96    Rest 92    Last Stress /94    Max HR  106    Max Predicted HR  61    Exercise duration (min) 5    Exercise duration (sec) 12    Estimated workload 3.6    Angina Index 2    RPE 14    PREDICTED VO2MAX 29.8    Max 14,310    CARPULSTRPH1 2    CARPULSTRPH1 101    CARPULBPPH1 120/90    CARPULSTRPH1 100    CARPULSTRPH2 4    CARPULSTRPH2 101    CARPULBPPH2 125/89    CARPULSTRPH2 100    CARPULSTRPH3 5    KBGILIJILCL7YRE 12    CARPULSTRPH3 104    CARPULBPPH3 135/94    CARPULSTRPH3 100    CARPULSTRPB1 1    CARPULSTRPHB1 98    CARPULBPPHB1 134/95    CARPULSTRPHB1 100    CARPULSTRPHB2 3    CARPULSTRPHB2 91    CARPULBPPHB2 132/96    CARPULSTRPHB2 100    CARPULSTRPHB3 7    CARPULSTRPHB3 88    CARPULBPPHB3 125/97    CARPULSTRPHB3 100    Max 12.70    Predicted 29.80    Percent 43    RER 0.99    VE/VCO2 [place]  59.11    Actual SVC (L) 3.60    Predicted SVC (L) 4.00    Percent SVC (L) 90    Actual FVC (L) 3.68    Predicted FVC (L) 4.00    Percent FVC (L) 92    Actual FVC (L) 3.09    Predicted FVC (L) 3.21    Percent FVC (L) 96    Actual FEV 1.0/FVC % 84.0    Predicted FEV 1.0/ FVC %  81.3    Percent FEV 1.0 FVC% 103    Rest 4.70    Max 12.70    Rest  1.40    Rest  387.00    Max 1,013.00    Rest 1.07    Max 0.99    Rest 4.00    Max  5.60    Rest 24.00    Max 61.50    Rest 1,089.00    Max  2,651.00    Rest 22.00    Max  23.00    Rest 77.80    Max 43.00    Rest 66.00    Max  60.00    Rest 62.00    Max  61.00    Max 59.11    Rest 130.00    Max 127.00    Rest 18.00    Max 19.00    Rest  100.00    Max 100.00    Narrative      A cardiopulmonary stress test was performed following a Javy ramp   protocol with the patient exercising for 5 minutes and 12 seconds under   the supervision of Dr. Khalil.  Blood pressure demonstrated a blunted   response to exercise.  Heart rate demonstrated a blunted response to   exercise.    The stress  electrocardiogram is negative for inducible ischemic EKG   changes.    There is no prior study for comparison.    The patient achieved an extremely impaired, class III functional capacity   with a cardiac limitation to exercise. A cardiac limitation is suggested   given the low peak VO2 and oxygen pulse (adjusted for beta blocker   therapy) as well as the abnormal hemodynamic response.     Echo Complete     Value    LVEF  15-20% (severely reduced)    [facility]    [id number]  [id number]  [id number]  [id number]^CHAD^EFRAIN     [facility],[place]  Echocardiography Laboratory  [address] [place] [id number]     Name: RITO CHRISTY  MRN: [id number]  : 1975  Study Date: 2023 02:49 PM  Age: 47 yrs  Gender: Female  Patient Location: [facility]  Reason For Study: CHF  Ordering Physician: EFRAIN BONILLA  Referring Physician: DELFINA COOPER  Performed By: Amberly Casas     BSA: 1.9 m2  Height: 67 in  Weight: 172 lb  ______________________________________________________________________________  Procedure  Echocardiogram with two-dimensional, color and spectral Doppler performed.  Contrast Optison. Optison (NDC #0236-5928-55) given intravenously. Patient was  given 6 ml mixture of 3 ml Optison and 6 ml saline. 3 ml wasted.  ______________________________________________________________________________  Interpretation Summary  Left ventricular function is decreased. The ejection fraction is 15-20%  (severely reduced). Severe diffuse hypokinesis is present.  Global right ventricular function is normal. The right ventricle is normal  size.  No significant valvular abnormalities.  IVC diameter <2.1 cm collapsing >50% with sniff suggests a normal RA pressure  of 3 mmHg.  There is no prior study for direct comparison.  ______________________________________________________________________________  Left Ventricle  Left ventricular wall thickness is normal. Severe left ventricular dilation is  present.  Left ventricular function is decreased. The ejection fraction is 15-  20% (severely reduced). Grade II or moderate diastolic dysfunction. Diastolic  Doppler findings (E/E' ratio and/or other parameters) suggest left ventricular  filling pressures are increased. Severe diffuse hypokinesis is present.     Right Ventricle  Global right ventricular function is normal. The right ventricle is normal  size. A pacemaker lead is noted in the right ventricle.     Atria  The right atria appears normal. Moderate left atrial enlargement is present.     Mitral Valve  Mild mitral insufficiency is present.     Aortic Valve  The aortic valve is tricuspid. On Doppler interrogation, there is no  significant stenosis or regurgitation.     Tricuspid Valve  Mild tricuspid insufficiency is present. The right ventricular systolic  pressure is approximated at 33.7 mmHg plus the right atrial pressure.     Pulmonic Valve  The valve leaflets are not well visualized. Trace pulmonic insufficiency is  present.     Vessels  The aorta root is normal. The thoracic aorta is normal. IVC diameter <2.1 cm  collapsing >50% with sniff suggests a normal RA pressure of 3 mmHg.     Pericardium  No pericardial effusion is present.     Compared to Previous Study  There is no prior study for direct comparison.  ______________________________________________________________________________  MMode/2D Measurements & Calculations  IVSd: 0.90 cm  LVIDd: 6.4 cm  LVIDs: 5.5 cm  LVPWd: 0.87 cm  FS: 14.1 %  LV mass(C)d: 239.8 grams  LV mass(C)dI: 126.4 grams/m2  Ao root diam: 3.4 cm  asc Aorta Diam: 2.9 cm  LVOT diam: 2.4 cm  LVOT area: 4.4 cm2  Ao root diam Index (cm/m2): 1.8  asc Aorta Diam Index (cm/m2): 1.5  LA Volume (BP): 100.0 ml     LA Volume Index (BP): 52.6 ml/m2  RWT: 0.27     Doppler Measurements & Calculations  MV E max cristiana: 90.1 cm/sec  MV A max cristiana: 41.2 cm/sec  MV E/A: 2.2  MV dec time: 0.15 sec  TR max cristiana: 290.4 cm/sec  TR max P.7 mmHg  E/E' avg:  16.0  Lateral E/e': 13.1  Medial E/e': 18.8  RV S Julio: 10.8 cm/sec     ______________________________________________________________________________  Report approved by: France Ruiz 2023 03:46 PM         Echo Limited     Value    LVEF  15-20% (severely reduced)    Narrative    [id number]  [id number]  [id number]  [id number]^ADAM^JAANY     [facility],[place]  Echocardiography Laboratory  [address] [place] [address]     Name: RITO CHRISTY  MRN: [id number]  : 1975  Study Date: 2023 09:42 AM  Age: 47 yrs  Gender: Female  Patient Location: [facility]  Reason For Study: CVA  Ordering Physician: JANAY MEDINA  Referring Physician: DELFINA COOPER  Performed By: Luis Alberto Mccoy     BSA: 1.9 m2  Height: 67 in  Weight: 168 lb  HR: 91  BP: 97/74 mmHg  ______________________________________________________________________________  Procedure  Limited Portable Echo Adult. Contrast Optison. Optison (NDC #3151-9549-52)  given intravenously. Patient was given 6 ml mixture of 3 ml Optison and 6 ml  saline. 3 ml wasted.  ______________________________________________________________________________  Interpretation Summary  No cardiac source of embolus identified.  Severe left ventricular dilation is present. Left ventricular function is  decreased. The ejection fraction is 15-20% (severely reduced).  Global right ventricular function is normal.  No pericardial effusion is present.  This study was compared with the study from 23: No significant changes  noted.     ______________________________________________________________________________  Left Ventricle  Severe left ventricular dilation is present. Left ventricular function is  decreased. The ejection fraction is 15-20% (severely reduced). Severe diffuse  hypokinesis is present. There is no thrombus seen in the left ventricle.     Right Ventricle  The right ventricle is normal size. Global right ventricular function  is  normal. A pacemaker lead is noted in the right ventricle.     Atria  The atria cannot be assessed.     Mitral Valve  The mitral valve is normal.     Aortic Valve  The valve leaflets are not well visualized. On Doppler interrogation, there is  no significant stenosis or regurgitation.     Vessels  The inferior vena cava was normal in size with preserved respiratory  variability. IVC diameter <2.1 cm collapsing >50% with sniff suggests a normal  RA pressure of 3 mmHg.     Pericardium  No pericardial effusion is present.     Compared to Previous Study  This study was compared with the study from 8/31/23 . No significant changes  noted.     ______________________________________________________________________________  MMode/2D Measurements & Calculations  IVSd: 1.2 cm  LVIDd: 6.2 cm  LVIDs: 5.5 cm  LVPWd: 0.92 cm  FS: 12.0 %  LV mass(C)d: 278.5 grams  LV mass(C)dI: 148.3 grams/m2  RWT: 0.29     ______________________________________________________________________________  Report approved by: France Roe 09/03/2023 02:38 PM         Cardiac Catheterization    Narrative      Right sided filling pressures are normal.    Left sided filling pressures are mildly elevated.    Mild elevated pulmonary hypertension.    Reduced cardiac output level.     Cardiac Catheterization    Narrative      Right sided filling pressures are normal.    Left sided filling pressures are normal.    Normal PA pressures.    Normal cardiac output level.         Discharge Medications   Discharge Medication List as of 9/11/2023  2:39 PM        START taking these medications    Details   hydrOXYzine (ATARAX) 25 MG tablet Take 1 tablet (25 mg) by mouth every 6 hours as needed for anxiety (adjuvant pain), Disp-30 tablet, R-0, E-Prescribe      metoprolol succinate ER (TOPROL XL) 25 MG 24 hr tablet Take 1 tablet (25 mg) by mouth daily, Disp-30 tablet, R-0, E-Prescribe      sacubitril-valsartan (ENTRESTO) 49-51 MG per tablet Take 1 tablet by  mouth 2 times daily, Disp-60 tablet, R-0, E-Prescribe      traZODone (DESYREL) 50 MG tablet Take 1 tablet (50 mg) by mouth nightly as needed for sleep, Disp-30 tablet, R-0, E-Prescribe           CONTINUE these medications which have CHANGED    Details   atorvastatin (LIPITOR) 80 MG tablet 1 tablet (80 mg) by Oral or Feeding Tube route At Bedtime, Disp-30 tablet, R-0, E-Prescribe      dapagliflozin (FARXIGA) 10 MG TABS tablet 1 tablet (10 mg) by Oral or Feeding Tube route every morning, 10 mg, Oral or Feeding Tube, EVERY MORNING Starting Tue 9/12/2023, Disp-90 tablet, R-1, E-Prescribe      folic acid (FOLVITE) 1 MG tablet Take 1 tablet (1 mg) by mouth daily, Disp-30 tablet, R-0, E-Prescribe      insulin detemir (LEVEMIR PEN) 100 UNIT/ML pen Inject 40 Units Subcutaneous At Bedtime, Disp-15 mL, R-0, E-Prescribe      nitroGLYcerin (NITROSTAT) 0.4 MG sublingual tablet For chest pain place 1 tablet under the tongue every 5 minutes for 3 doses. If symptoms persist 5 minutes after 1st dose call 911., Disp-30 tablet, R-0, E-Prescribe      potassium chloride ER (KLOR-CON M) 20 MEQ CR tablet Take 1 tablet (20 mEq) by mouth 2 times daily, Disp-60 tablet, R-0, E-Prescribe      sertraline (ZOLOFT) 50 MG tablet 3 tablets (150 mg) by Oral or Feeding Tube route daily, Disp-30 tablet, R-0, E-Prescribe      spironolactone (ALDACTONE) 25 MG tablet 1 tablet (25 mg) by Oral or Feeding Tube route every morning, Disp-30 tablet, R-0, E-Prescribe           CONTINUE these medications which have NOT CHANGED    Details   insulin aspart (NOVOLOG FLEXPEN) 100 UNIT/ML pen Inject 1-10 Units Subcutaneous 4 times daily (with meals and nightly) SLIDING SCALE, Historical      torsemide (DEMADEX) 20 MG tablet Take 100 mg by mouth 2 times daily, Historical           STOP taking these medications       benzonatate (TESSALON) 200 MG capsule Comments:   Reason for Stopping:         carvedilol (COREG) 3.125 MG tablet Comments:   Reason for Stopping:          digoxin (LANOXIN) 125 MCG tablet Comments:   Reason for Stopping:         metolazone (ZAROXOLYN) 2.5 MG tablet Comments:   Reason for Stopping:         sacubitril-valsartan (ENTRESTO)  MG per tablet Comments:   Reason for Stopping:         Vitamin D, Cholecalciferol, 10 MCG (400 UNIT) TABS Comments:   Reason for Stopping:             Allergies   Allergies   Allergen Reactions    Aspirin Hives     Told nursing on 6/1/22 she breaks out in hives if she takes aspirin.    Ibuprofen Hives    Lisinopril Cough    Oxycodone-Acetaminophen Nausea and Vomiting

## 2023-09-11 NOTE — PROGRESS NOTES
"Met with patient and cousinDinora to present the HM3 as a possible treatment option.     Discussed patient and caregiver responsibilities before and after VAD implant. Clarified that, r/t COVID-19 visitor restrictions, only 2 caregivers may be trained. Clarified the need for a caregiver to be present for education and to assist patient for at least first 30 days after a patient returns home. Patient's designated caregiver is Dinora.     Discussed basic overview of VAD equipment, on going management, need for anticoagulation, regular dressing change, grounded three-pronged outlet and functioning telephone. Also discussed frequency of follow-up clinic appointments and the need to stay locally for at least 2-4 weeks.  Reviewed restrictions of having a VAD such as no swimming, bathing, boats, MRI's, or arc welding.     Provided and discussed the VAD educational brochure, information regarding the VAD and transplant support groups, and \"VAD What You Should Know\" with additional details. Patient and Dinora signed \"VAD What You Should Know\" document (or agreed to have VAD Coordinator sign on their behalf). VAD coordinator contact information provided.  Encouraged patient and Dinora to call with questions. Learners verbalized understanding of the above information.    "

## 2023-09-11 NOTE — PROGRESS NOTES
Brief GI procedure note:    Colonoscopy without polyp. Normal entire colon and terminal ileum.    Regular diet  GI will sign off.  Kamari Simons MD  Gastroenterology/Hepatology Fellow

## 2023-09-11 NOTE — PROGRESS NOTES
"CLINICAL NUTRITION SERVICES - REASSESSMENT NOTE     Nutrition Prescription    RECOMMENDATIONS FOR MDs/PROVIDERS TO ORDER:  None at this time.     Malnutrition Status:    Patient does not meet two of the established criteria necessary for diagnosing malnutrition    Recommendations already ordered by Registered Dietitian (RD):  Prn snack/supplement order    Future/Additional Recommendations:  -Diet advancement post-procedure to a \"Combination Diet\" of High Consistent Carb + Low-sodium (2g vs 3 g). Rec self-select tolerated foods/beverages. Pt with increased protein needs.   -Order a multivitamin with minerals to help meet micronutrient needs, if inadequate intake.   -Monitor BG control. Hgb A1c of 7.8 on 08/10/2023, 8.8 on 9/2. DM 2.    -Monitor stooling patterns and potential need to adjust scheduled bowel regimen.   -If oral supplements are needed this admission, pt would prefer strawberry then chocolate-flavored oral supplements if needed (if oral intake inadequate). Would also like apple-flavored oral supplements.   -Monitor need for thiamine if becomes warranted.      EVALUATION OF THE PROGRESS TOWARD GOALS   Diet: Clear Liquids since 9/10 (colonoscopy). On a 2 L fluid restriction. Initially on a 2 g sodium diet on 8/31. Has been NPO at times (tests/procedures). More recently on a 2 g sodium diet 9/6 and then a low fiber diet 9/7-9/9. SLP signed off 9/7.   Intake/Tolerance: Good diet tolerance. Per nursing flowsheets (% intake), pt consistently consuming % with a good appetite. Per discussion with pt (9/11), her appetite is good (when not NPO) and she is eating 100% of her usual oral intake. [employer] (meal ordering system) indicates food/beverages sent 9/8-9/9 totaled 2301 kcals and 78 g protein daily on average which meets estimated needs below.     Nutrition Support: None this admission.      NEW FINDINGS   Resp: No O2  WOC: Not following at this time  GI: Having zero to one stool daily on average. " Last Bowel Movement: 09/10/23 (09/10/23 2000). On scheduled miralax and senna, being held. Stools are formed and brown.  Wt Hx: 79.8 kg (7/28/2022), 78.3 kg (3/7/2023), 78 kg (7/19/2023), 79.8 kg (8/10/23), 78 kg (8/31, admit), 79.6 kg (9/11)-  No significant/severe wt loss. Wt may vary, at times, due to fluid status changes.     ASSESSED NUTRITION NEEDS (for inpatient hospital stay)  Dosing Weight: 65 kg (adjusted, based on lowest wt so far this admission of 75 kg on 9/2)  Estimated Energy Needs: 9150-9827 kcals/day (25 - 30 kcals/kg)  Justification: Maintenance  Estimated Protein Needs: 72-91 grams protein/day (1.1-1.4 grams of pro/kg)  Justification: Increased needs with cardiac status  Estimated Fluid Needs: 2000 mL/day  Justification: On a fluid restriction.     MALNUTRITION  % Intake: No decreased intake noted  % Weight Loss: None noted  Subcutaneous Fat Loss: None observed  Muscle Loss: None observed  Fluid Accumulation/Edema: Does not meet criteria  Malnutrition Diagnosis: Patient does not meet two of the established criteria necessary for diagnosing malnutrition    Previous Goals   Patient to consume % of nutritionally adequate meal trays TID, or the equivalent with supplements/snacks.  Evaluation: Meeting.    Previous Nutrition Diagnosis  Predicted inadequate nutrient intake (calories / protein) related to potential for menu fatigue with restricted diet and anticipated prolonged hospitalization  Evaluation: Unresolved/unchanged.     CURRENT NUTRITION DIAGNOSIS  Predicted inadequate nutrient intake (calories / protein) related to potential for menu fatigue with restricted diet and anticipated prolonged hospitalization    INTERVENTIONS  Implementation  Nutrition education: Provided sodium and carbohydrate room service menus.   Medical food supplement therapy: Prn snack/supplement order. Discussed oral supplements if needed in the future. Per pt, would prefer strawberry then chocolate-flavored oral  supplements if needed (if oral intake inadequate). Would also like apple-flavored oral supplements.     Goals  Patient to consume % of nutritionally adequate meal trays TID, or the equivalent with supplements/snacks.    Monitoring/Evaluation  Progress toward goals will be monitored and evaluated per protocol.     Nutrition will continue to follow.      Toya Madrigal MS, RD, LD, [facility]   6C (beds 7903-7827 and [id number]) RD pgr: [phone]  Saturday/Sunday/Holiday RD pgr: [phone]

## 2023-09-11 NOTE — PROGRESS NOTES
Neuro: A&Ox4.   Cardiac: VSS.   Respiratory: Sating % on RA.  GI/: Adequate urine output. BM X1.  Diet/appetite: Tolerating 2 gram sodium diet and eating well with carb coverage. Pt verbalizes understanding of fluid restriction.   Activity:  Independent up to chair and in halls.  Pain: At acceptable level on current regimen. Tylenol for headache.  Skin: No new deficits noted.  LDA's: PIV removed.  Plan: Pt discharged home with cousin at this time. All questions and concerns answered.

## 2023-09-11 NOTE — OR NURSING
Colonoscopy under conscious sedation.  No interventions.  Pt tolerated procedure.  Report called to Maty at 1023hrs

## 2023-09-11 NOTE — CONSULTS
"Palliative Care Consultation Note  [facility]      Patient: Jamilah Jiménez  Date of Admission:  8/31/2023    Requesting Clinician / Team: Aron Bañuelos MD  Reason for consult: Support, Goals of care - \"Patient with advanced heart disease, potential heart transplant recipient and /or Ventricular Assist Device (VAD) candidate\"     Recommendations & Counseling     GOALS OF CARE:   Restorative with limits - Per discussion below, would only want to be on maximal life support for 3 days as she has seen other family members (father and sister) on life support for many days.  Would like to get an LVAD with the goal of heart transplant but okay with LVAD as destination therapy    ADVANCE CARE PLANNING:  Patient has an advance directive dated 9/8/2023.  Primary Health Care Agent cousin Nilda.  Alternate(s) daughter Karly.   There is no POLST form on file, defer to patient and/or next of kin for decisions   Code status: Full Code    MEDICAL MANAGEMENT:   We are not actively managing symptoms at this time.    PSYCHOSOCIAL/SPIRITUAL SUPPORT:  Family   [place] community: Other     Palliative Care will sign-off. Thank you for the consult and allowing us to aid in the care of Jamilah Jiménez.    These recommendations have been given to the primary team via this note.    Fransico Soto MD  Securely message with [employer] (more info)  Text page via [employer] Paging/Directory       Assessment      Jamilah Jiménez is a 47 year old female with a past medical history of HFrEF, Class III, Stage C-D, DM2, HTN, PE admitted following RHC with CI 1.61. Admitted for further evaluation and consideration for advanced heart failure therapies. Palliative was consulted for LVAD vs heart transplant work-up.    Today, the patient was seen for:  Goals of care  Support  Encounter for palliative care    Palliative Care Summary:   Met with Jamilah and her daughter along with cousin and niece.     I " introduced our role as an extra layer of support and how we help patients and families dealing with serious, potentially life-limiting illnesses. I explained the composition of the palliative care team.  Palliative care helps patients and families navigate their care while focusing on the whole person; providing emotional, social and spiritual support  Palliative care often assists with symptom management, information sharing about what to expect from the illness, available treatment options and what effect those options may have on the disease course, and provide effective communication and caring support.    Palliative Care questions for pre- LVAD patients:  What are your personal hopes/goals for receiving the LVAD? - Improve quality of life and ability to tolerate work and activities with loved ones    What are the activities/abilities that make your life worth living?- Good quality family time    What does a typical day look like for you? - Currently unable to do much due to dyspnea and fatigue. Mostly at rest but able to interact with family especially daughter. 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     There are risks for kidney failure in patients with advancing heart disease who need LVAD support.  The places/locations that offer dialysis and accept patients with LVADs may be limited in your community.  What do you know about dialysis? How would your possible need for dialysis influence your quality of life? - Does not know much about dialysis. I explained what the process might look like (going to a center for 3 half days a week and possibly feeling fatigued afterward) and needing to get a permanent line/fistula placed for access, possible rehospitalization, etc. Jamilah states she is okay to be on dialysis if it would hopefully help her increase her quantity of life but not affect the quality of her life too much.    The need to be on a  ventilator/breathing machine through a tracheostomy (a tube in your neck) is a risk with LVAD. What are with circumstances you would want your medical providers and family to keep you alive with long term mechanical ventilation? - Would not want to be on life support for more than 3 days (sister and father had to be on it for many days and she does not believe that she would want to try longer than 3 days). She is aware that 3 days may not be an adequate amount of time to see if she would be improving or not.    An LVAD carries a risk of stroke which, for some people, may limit their ability to communicate their wishes to others.  After a stroke, what sort of outcomes would be unacceptable to you (ie needing to live in nursing facility, loss of independence.. Etc.) - She is unsure if outcomes would be acceptable from a CVA and is unsure if she would ever want to be in stuck in a nursing home. She states she will speak to her family about this more.    LVAD s are often placed with future heart transplant consideration. Some of our patients are determined not to be heart transplant candidates, or choose to forego heart transplant surgery for personal reasons.  If you had an LVAD placed inside of you for the remainder of your life, what would be your concerns? - Largest concerns would be how it would affect her ability to perform ADLs and other activities. She would be interested in speaking to her LVAD team more about this if the LVAD is destination therapy.    What circumstances or events would lead you to decide or ask your health care agent to decide that you would want the LVAD turned off and be allowed to die a natural death? - She would never want to turn off the LVAD      Prognosis, Goals, & Planning:    Functional Status just prior to this current hospitalization:  NYHA Class III    Prognosis, Goals, and/or Advance Care Planning:  We discussed general treatment options (full/restorative,  selective/conservatives, and comfort only/hospice). We then discussed how these specifically apply to her heart failure and quality of life.  Based on this discussion, Jamilah has decided to pursue full/restorative measures.    Code Status was addressed today:   No - to remain FULL code    Patient's decision making preferences: independently        Patient has decision-making capacity today for complex decisions:Intact            Coping, Meaning, & Spirituality:   Mood, coping, and/or meaning in the context of serious illness were addressed today: Yes    Social:   Occupation: Former     Medications:  I have reviewed this patient's medication profile and medications from this hospitalization.      ROS:  Comprehensive ROS is reviewed and is negative except per HPI    Physical Exam   Vital Signs with Ranges  Temp:  [97.2  F (36.2  C)-98.4  F (36.9  C)] 98.4  F (36.9  C)  Pulse:  [57-81] 81  Resp:  [12-28] 20  BP: (117-148)/(71-89) 121/80  SpO2:  [95 %-100 %] 95 %  175 lbs 6.4 oz    PHYSICAL EXAM:  General: Not in acute distress.  Head: Atraumatic. Normocephalic.   Eyes: Anicteric without injection. Eyes conjugate. Extraocular movements intact.  Ear, Nose, and Throat: Mouth pink and moist without lesions. Neck without overt masses.  Pulmonary: Unlabored. Speaking in full sentences  Cardiovascular: No overt jugular venous distension. Minimal LE edema.  Abdomen: Non-distended. Soft. Non-tender to palpation. Normal bowel sound present.  Skin: Warm. Dry. No bruises or rashes noted.  Musculoskeletal: Joints of hand normal. Muscle bulk and tone normal in UE and LE.  Neuro: Speech fluent. Face symmetric. No focal neurologic deficit noted. Alert and oriented x4.  Psych: Normal mood and affect. Sensorium, gross memory, thought processes, and fund of knowledge intact.        Data reviewed:  Results for orders placed or performed during the hospital encounter of 08/31/23 (from the past 24 hour(s))   Glucose by  meter   Result Value Ref Range    GLUCOSE BY METER POCT 98 70 - 99 mg/dL   Glucose by meter   Result Value Ref Range    GLUCOSE BY METER POCT 160 (H) 70 - 99 mg/dL   Basic metabolic panel   Result Value Ref Range    Sodium 141 136 - 145 mmol/L    Potassium 4.2 3.4 - 5.3 mmol/L    Chloride 110 (H) 98 - 107 mmol/L    Carbon Dioxide (CO2) 22 22 - 29 mmol/L    Anion Gap 9 7 - 15 mmol/L    Urea Nitrogen 5.8 (L) 6.0 - 20.0 mg/dL    Creatinine 0.96 (H) 0.51 - 0.95 mg/dL    Calcium 8.9 8.6 - 10.0 mg/dL    Glucose 102 (H) 70 - 99 mg/dL    GFR Estimate 74 >60 mL/min/1.73m2   Magnesium   Result Value Ref Range    Magnesium 1.8 1.7 - 2.3 mg/dL   Phosphorus   Result Value Ref Range    Phosphorus 2.7 2.5 - 4.5 mg/dL   Glucose by meter   Result Value Ref Range    GLUCOSE BY METER POCT 112 (H) 70 - 99 mg/dL     No results found for this or any previous visit (from the past 24 hour(s)).

## 2023-09-12 ENCOUNTER — DOCUMENTATION ONLY (OUTPATIENT)
Dept: SURGERY | Facility: CLINIC | Age: 48
End: 2023-09-12
Payer: MEDICAID

## 2023-09-12 LAB
A*: NORMAL
A*LOCUS SEROLOGIC EQUIVALENT: 24
A*LOCUS: NORMAL
A*SEROLOGIC EQUIVALENT: 31
ABTEST METHOD: NORMAL
B*: NORMAL
B*LOCUS SEROLOGIC EQUIVALENT: 35
B*LOCUS: NORMAL
B*SEROLOGIC EQUIVALENT: 51
BW-1: NORMAL
BW-2: NORMAL
C*: NORMAL
C*LOCUS SEROLOGIC EQUIVALENT: 10
C*LOCUS: NORMAL
C*SEROLOGIC EQUIVALENT: 12
COLONOSCOPY: NORMAL
DPA1*: NORMAL
DPB1*: NORMAL
DPB1*LOCUS: NORMAL
DQA1*: NORMAL
DQA1*LOCUS: NORMAL
DQB1*LOCUS NMDP: NORMAL
DQB1*LOCUS SEROLOGIC EQUIVALENT: 7
DQB1*LOCUS: NORMAL
DRB1*: NORMAL
DRB1*LOCUS SEROLOGIC EQUIVALENT: 4
DRB1*LOCUS: NORMAL
DRB1*SEROLOGIC EQUIVALENT: 14
DRB3*LOCUS SEROLOGIC EQUIVALENT: 52
DRB3*LOCUS: NORMAL
DRB4*: NORMAL
DRB4*SEROLOGIC EQUIVALENT: 53
DRSSO TEST METHOD: NORMAL

## 2023-09-12 NOTE — PROGRESS NOTES
Jamilah Jiménez No show to scheduled appointment for dental exam, radiographs and possible extraction with [facility] dental clinic on 09/12/2023 at 9:30 am.   Pt was discharged 09/11/2023, she can follow up with her dentist. She may follow up with us also, Call [phone] to schedule an appointment with us.     Junaid Murphy  Pager-[phone]  [facility]  [facility]  [address] [place] [address]  Phone:[phone]  Mary, Executive Office & Administrative Support phone: [phone]

## 2023-09-13 ENCOUNTER — LAB REQUISITION (OUTPATIENT)
Dept: LAB | Facility: CLINIC | Age: 48
End: 2023-09-13
Payer: MEDICAID

## 2023-09-13 ENCOUNTER — PATIENT OUTREACH (OUTPATIENT)
Dept: CARE COORDINATION | Facility: CLINIC | Age: 48
End: 2023-09-13
Payer: MEDICAID

## 2023-09-13 NOTE — PROGRESS NOTES
Stroke RN Care Coordination - Chart Review Note    SITUATION     Jamilah Jiménez is a 47 year old female who is receiving support for:  Chart Review Please (Stroke RNCC Review)    BACKGROUND     Stroke Care Coordination referral placed automatically on admission to [facility] given that stroke ICU orderset was used.     ASSESSMENT     However, on review, MRI was negative for stroke and after pt was evaluated by psych she was though to have some component of conversion disorder, so antiplatelet therapy was not initiated and pt was not advised to have stroke neurology follow up.    PLAN     Will close stroke management program due no stroke related needs.    Marcela Huerta BS, RN, SCRN  RN Stroke Neurology Care Coordinator  [facility] Service Line

## 2023-09-20 ENCOUNTER — CARE COORDINATION (OUTPATIENT)
Dept: TRANSPLANT | Facility: CLINIC | Age: 48
End: 2023-09-20
Payer: MEDICAID

## 2023-09-20 NOTE — LETTER
Jamilah Jiménez  [address]  [place] [address]                2023    Re:  Jamilah Jiménez  : 1975  ICD10:  I50    Attn:  Ami Serrano  Subject: Vaccination Update Request      To Whom It May Concern:    Jamilah Jiménez is a potential heart transplant recipient currently being followed by the [facility].  We would like to request your assistance in obtaining a vaccine update in the care of our mutual patient. We recommend all transplant candidates have updated non-live vaccines, as listed below. Currently, the only vaccine required for heart transplant is the COVID vaccine. If the patient is not already updated on these vaccines, please assist in administering the following:    Inactivated Influenza Vaccine (IIV)-yearly    Tetanus, diphtheria, Pertussis (Dtap) if none in 10 years and Tdap booster every 5-10 years    Hepatitis B vaccine series (if HBsAb negative, HBcAB and ABsAg negative)-see results below    Pneumococcal vaccine:  -if naive to any pneumonia vaccine:  PCV-13, PPSV-23 vaccine > or = 8 weeks later, subsequent PPSV-23 vaccine > or = 5 years from the prior PPSV-23    -if with prior PPSV-23 x1 in the past:  PCV-13 > or = 1 year from prior PPSV-23  PPSV -23 > or = 5 years from the prior PPSV-23 and > or = 8 weeks from the prior PCV-13    -if with prior PPSV-23 x2 in the past:  PCV-13 > or = 1 year from the last PPSV-23    Shinrix    Please fax results as soon as they are available to:   Thoracic Transplant Department  Fax Number:  [phone]    Thank you  for your continued support and the opportunity to collaborate in the care of this patient.  If you have any questions, please call ELDON Frost, Transplant Coordinator,  at [phone] (option 2) or [phone].      Dr. Jaret DICKSON [facility] Division of Cardiology    Margot Pittman RN, BSN  Heart Transplant Coordinator  [facility]    CC:  Jamilah Jiménez

## 2023-09-20 NOTE — PROGRESS NOTES
D: Pt recently discharged following admission for heart failure exacerbation and advanced therapies evaluation.   I: Called to discuss pt's evaluation (see previous note). Pt states she has started to feel worse recently. She had gained at least 3lbs since leaving the hospital. Her breathing has worsened and she has less energy. She has noticed bloating in her abdomen and legs. She also does not have an appetite. She is scheduled to see Dr. Saucedo on in about 2.5 weeks.  A: Pt with worsening heart failure symptoms. Recommended she call the [place] team to discuss her symptoms and possibly schedule another appointment. Pt verbalized understanding.   P: Pt states she will contact the team in [place]. Will update Dr. Saucedo.

## 2023-09-20 NOTE — PROGRESS NOTES
Heart Transplant Intake Call  Date:  09/20/23    Pt referred for inpatient Heart Transplant evaluation. Called patient and introduced the Heart Transplant Program.  Spoke with patient to discuss the evaluation process for transplant.    Discussed need for caregiver support during evaluation process, and post transplant and/or VAD implant. Pt's 30-day caregiver will be her cousins. (VAD ONLY) Pt's long term caregiver support will be her cousins. This is new for her. She did not have a solidified caregiver plan at the time of discharge.    Discussed importance of avoiding nicotine, illegal drugs - including cannabis -, and using alcohol only minimally or none at all as decided upon between patient and advanced heart failure cardiologist. Discussed age and BMI requirements for transplant listing.    Heart Transplant Packet and information for [facility] was sent to the patient via email. Patient was encouraged to review and sign this information. They were encouraged to have a caregiver present during heart transplant teaching.     Plan: Patient verbalized understanding and in agreement with the plan for evaluation. Was engaged and forthcoming with information. Asked appropriate questions in regard to this process. Patient denied any further transplant related questions and/or concerns.  Patient given the contact information to the transplant office and understands to contact coordinator with any further questions or concerns.    CareTeam    Referring Provider:Inpatient Team  Source/Facility: [facility]  Diagnosis: NICM - genetic    Critical History     Smoking/nicotine use history: none  Alcohol use history: none x2 years - minimal previously  Drug use history: Positive methamphetamine test in March '23  Cancer history: none  BMI: 28  Dialysis: none    Routine Health Maintenance  Last Dental Visit: was week. Needs a root canal - scheduled for next week. Discussed importance of seeing dentist at least  once every 12 months in order to maintain transplant eligibility. Pt encouraged to ask their dental clinic to send a letter of dental clearance to the transplant office.   Last Colonoscopy: 9/11/23  while inpatient. Records to be obtained from: Epic  Last Mammogram: last week Records to be obtained from: [facility] - will need a follow-up because there was a new finding  Last Pap Smear: last week Records to be obtained from: [facility]  Discussed importance of vaccines prior to transplant. Pt is aware that the COVID-19 vaccine is currently a requirement for transplant listing    Comments    Referral intake process completed.  Patient is aware that after financial approval is received, medical records will be requested.     Reminded patient to complete questionnaire, complete medical records release, and review packet.  Assessed patient for special needs (ie--wheelchair, assistance, guardian, and ):  none   Patient instructed to call [phone] with questions.

## 2023-10-04 ENCOUNTER — LAB REQUISITION (OUTPATIENT)
Dept: LAB | Facility: CLINIC | Age: 48
End: 2023-10-04
Payer: MEDICAID

## 2023-10-05 NOTE — PROGRESS NOTES
Virtual Visit Details  Type of service:  Video Visit   Video Start Time: 11:07 AM  Video End Time: 11:55 AM    Originating Location (pt. Location): Other    Distant Location (provider location):  On-site  Platform used for Video Visit: Hank      PROVIDER APPOINTMENT  Here is a copy of the progress note from your recent genetic counseling visit through the [facility] on Date: 10/6/2023.  Patient was seen through a virtual visit.    PROGRESS NOTE:Jamilah was seen for genetic counseling due to her history of nonischemic cardiomyopathy (NICM).  I had the opportunity to talk with Jamilah today to discuss the genetic component of NICM and testing options available to her .     MEDICAL HISTORY: Jamilah reports symptoms of shortness of breath and fatigue started 2-3 years ago.  After multiple hospitalizations and tests, she was ultimately diagnosed with NICM.    Echo on 22 showed a severely dilated left ventricle; mild-to-moderate left ventricular hypertrophy; severely reduced left ventricular systolic function (LVEF 30-35%), and severe global hypokinesis of the left ventricular wall segments.     More recent MRI (23) showed severely dilated LV; global systolic function is severely reduced (LVEF 18%); and severe diffuse hypokinesis consistent with non-ischemic cardiomyopathy.  In this clinical context, this is most consistent with a genetic cardiomyopathy.     History is also remarkable for type II diabetes mellitus, hypertension, and kidney problems.      FAMILY HISTORY:A detailed family history was obtained during today's consult.  Family history was significant for the following cardiac history:  Son (26) was diagnosed with cardiomyopathy at 8 years.  Maintained with medication.  Daughter (23) with anxiety and depression.  Four healthy sons.  Son (22) with anxiety.    Daughter (6) in good health.  Sister  at 30 years in a MVA.  Father  at 53 years with a history  of multiple health issues.  He developed an infection after a back surgery.    Three paternal uncles and four aunts are all  over 70 years.  Many had heart problems but no details are known.    Paternal grandmother  80-90's.  Little is known about grandfather.  Mother (60's) with no heart issues.  Maternal aunt with cardiomyopathy. No more details known.  Three maternal uncles  with coronary artery disease.  Seven other aunts/uncles with no known heart issues.    Maternal grandfather  in his sleep and had a history of heart problems.  Grandmother  70-80's with abdominal aneurysm.  There is no additional history of cardiomyopathy, arrhythmias, heart attacks, fainting, sudden cardiac death, genetic conditions, or birth defects. (A copy of pedigree may be found under media tab).    DISCUSSION: Non-ischemic cardiomyopathy results from causes other than loss of blood flow to the heart.  Cardiomyopathy can be caused by both acquired and genetic causes.  Acquired causes include exposure to toxins, injury related to coronary artery disease (ischemic), and chronic high blood pressure. When familial, it often results in dilated cardiomyopathy (DCM), where the left ventricle gets enlarged or stretched out.  There is a genetic basis found in 20-50% of DCM cases.      Reviewed autosomal dominant (AD) inheritance pattern most commonly associated with DCM, including the 50% risk for recurrence. Briefly reviewed autosomal recessive and X-linked inheritance. Explained that DCM gene mutations are associated with reduced penetrance and variable expressivity, meaning that individuals who carry a gene mutation may or may not get the disease and onset and severity can vary from one family member to the next. This explains why you may not see cardiomyopathy in each generation of a family.     Currently over 40 genes have been found to be related to DCM. Genetic testing currently identifies mutations in about 40% of  idiopathic cases. Reviewed capabilities, limitations, and logistics of testing.  DNA sample via saliva or blood is collected and sent to testing lab for evaluation of selected genes. The results could directly impact care and treatment.      Explained three possible outcomes of genetic testing including: positive identification of a mutation, no mutation identified, and identification of a variant of unknown significance (VUS). If a mutation is identified, presymptomatic testing would be available to at risk family members. If no mutation is identified, it does not rule out the possibility of a genetic component to this disease. Family members could still be at risk for developing the same condition. If a VUS is identified, it is unclear if the mutation is disease causing or just a normal variation. It may take time and possibly additional testing to determine the meaning of a VUS result.     Test results take approximately 2-4 weeks on average. Discussed cost of testing through commercial labs. Explained that the lab works with insurance to determine coverage and will contact patient if out of pocket costs are expected to exceed $100. If so, patient has the option to pay reported price, cancel testing or elect self pay pricing (typcially around $250).     Discussed pros and cons of genetic testing. Explained that results could determine the cause for dilated cardiomyopathy. If a mutation is identified, presymptomatic testing is available to all at risk relatives. Reviewed possible issues associated with presymptomatic testing including genetic discrimination, current laws to prevent discrimination (ie. ISAEL), insurance issues, and emotional and psychosocial outcomes of testing.     Explained that clinical evaluation is recommended for all first degree relatives (parents, siblings, and children) of an affected individual regardless of decision to pursue genetic testing. Based on the Heart Failure Society of [place]  Practice Guidelines (Tatyana et al, 2018), clinical evaluation should be performed at least every 3-5 years beginning and childhood and should include history, cardiac exam, ECHO, and EKG.    All questions answered at this time.     PLAN: Jamilah elected to proceed with genetic testing.  Requisition and consent forms were completed and signed.  DNA will be collected via cheek swab sample and sent to [facility]  laboratory. I will contact patient when results are available.    TOTAL TIME SPENT IN COUNSELIN minutes    Lindsay Bone MS, [facility]  Licensed, Certified Genetic Counselor  [facility]

## 2023-10-06 ENCOUNTER — TELEPHONE (OUTPATIENT)
Dept: CARDIOLOGY | Facility: CLINIC | Age: 48
End: 2023-10-06
Payer: MEDICAID

## 2023-10-06 ENCOUNTER — VIRTUAL VISIT (OUTPATIENT)
Dept: CARDIOLOGY | Facility: CLINIC | Age: 48
End: 2023-10-06
Attending: GENETIC COUNSELOR, MS
Payer: MEDICAID

## 2023-10-06 VITALS — BODY MASS INDEX: 28.25 KG/M2 | HEIGHT: 67 IN | WEIGHT: 180 LBS

## 2023-10-06 DIAGNOSIS — I42.8 NONISCHEMIC CARDIOMYOPATHY (H): Primary | ICD-10-CM

## 2023-10-06 PROCEDURE — 96040 HC GENETIC COUNSELING, EACH 30 MINUTES: CPT | Mod: 95 | Performed by: GENETIC COUNSELOR, MS

## 2023-10-06 ASSESSMENT — PAIN SCALES - GENERAL: PAINLEVEL: NO PAIN (0)

## 2023-10-06 NOTE — PATIENT INSTRUCTIONS
"Indication for Genetic Counseling:     Non-ischemic cardiomyopathy results from causes other than loss of blood flow to the heart.  Cardiomyopathy can be caused by both acquired and genetic causes.  Acquired causes include exposure to toxins, injury related to coronary artery disease (ischemic),  infections and inflammation of the heart (myocarditis), alcohol/drug abuse, stress, and chronic high blood pressure.  When familial, it often results in dilated cardiomyopathy (DCM), where the left ventricle gets enlarged or stretched out.  Dilated cardiomyopathy (DCM) is a condition that causes the heart to lose it's elasticity, which leads to stretching and dilation.  It is usually diagnosed by an echocardiogram (ECHO) or cardiac magnetic resonance imaging (MRI).  When the heart becomes dilated, it cannot pump blood as effectively, which can lead to symptoms such as congestive heart failure, fluid accumulation in the body (edema), shortness of breath, fatigue, irregular heartbeat, fainting, stroke, cardiac arrest, and sudden cardiac death (SCD).   There are at least 40 genes known to be associated with DCM.    Inheritance:   Humans have over 20,000 genes that instruct our bodies how to function.  We have two copies of each gene because we inherit one from our mother and one from our father.  In most cardiac cases with a genetic component, the condition is inherited in an autosomal dominant (AD) pattern.  This means that in order to have the condition, a person needs to inherit a mutation on one copy of a particular gene.  This mutation or pathogenic variant dominates the \"normal\" working copy of the gene.  When an affected individual has children, they can either pass on the \"normal\" copy of the gene or the mutation.  Therefore, children have a 50% chance of inheriting the mutation.  Other family members also have an increased risk but the specific risk depends on the degree of relationship.  Additional inheritance " patterns can occur within families and may alter the risk of recurrence.     Testing Options:   Genetic testing is available to assess a panel of genes known to cause this condition.  This test reads through the DNA (sequencing) of these genes to look for spelling mistakes or mutations that could cause the condition.      There are three types of results you could receive from this test.     -Positive result (mutation/pathogenic variant identified) - confirms diagnosis and provides an answer to why this happened.  In addition, identifying a mutation allows family members to have testing to determine their risk.     -Negative result (mutation not identified) - no genetic changes were identified.  This does not rule out a genetic cause for the condition as the genetic testing only identifies 40-50% of genetic causes for this condition.    -Variant of uncertain significance (VUS) - a genetic change was identified, but there is not enough information to determine whether it is disease-causing or normal human genetic variation.     Although genetic testing may identify a mutation, it cannot provide information about the severity of symptoms or the progression of disease.  We cannot predict age of onset or severity of symptoms due to reduced penetrance and variable expressivity.    Logistics:   Genetic testing involves collecting a sample of DNA, thru blood, saliva, or cheek cells.    The sample will be sent to a laboratory to extract the DNA and sequence the genes for mutations.  The laboratory will work with your insurance company to determine the out of pocket (OOP) cost and will notify you if the OOP cost is greater than $100.  Remember to ask the lab about financial assistance pricing and self pay options as well.  Sometimes those are much lower than insurance pricing.  When testing is initiated, results take about 2-4 weeks to return. I will contact you over the phone when results are available.     Genetic  Information and Nondiscrimination Act:  The Genetic Information and Nondiscrimination Act of 2008 (ISAEL) is a federal law that protects individuals from genetic discrimination in health insurance and employment. Genetic discrimination is defined as the misuse of genetic information. This law does not address potential discrimination regarding life insurance or disability insurance.      This is especially relevant for at risk individuals who are considering presymptomatic testing.    Screening Recommendations:  Explained that clinical evaluation is recommended for all first degree relatives (parents, siblings, and children) of an affected individual regardless of decision to pursue genetic testing. Based on the Heart Failure Society of [place] Practice Guidelines (Tatyana et al, 2009), clinical evaluation should be performed at least every 3-5 years beginning and childhood and should include history, cardiac exam, ECHO, and EKG.    Resources:  Cardiomyopathy  Hypertrophic Cardiomyopathy Association - 4hcm.org  Children's Cardiomyopathy Foundation - childrenscardiomyopathy.org  [place] Cardiomyopathy Association - cardiomyopathy.org  Dilated Cardiomyopathy Foundation - DCMfoundation.org    Arrhythmia  Heart Rhythm Society - HRSonline.org    General   American Heart Association - americanheart.org  Genetics Home Reference - ghr.nlm.nih.gov  Genetic Information and Nondiscrimination Act - ginahelp.org    Contact Information:  Lindsay Bone MS  Licensed Genetic Counselor  [facility]  [facility]    Office:  [phone]  Appointments:  [phone]  Fax: [phone]  Email: [email or web address]

## 2023-10-06 NOTE — TELEPHONE ENCOUNTER
[facility] Call Center    Phone Message    May a detailed message be left on voicemail: no     Reason for Call: Other: Pt is requesting appointment reminder with details, time, date, provider to be forwarded via fax to [phone] ATTN: Kaitlyn    Pt is trying to get help with gas money to come to visit.        Action Taken: Other: cardio    Travel Screening: Not Applicable

## 2023-10-06 NOTE — LETTER
10/6/2023      RE: Jamilah Jiménez  [address]  [place] [address]       Dear Colleague,    Thank you for the opportunity to participate in the care of your patient, Jamilah Jiménez, at the [facility] [place] [facility]. Please see a copy of my visit note below.        PROVIDER APPOINTMENT  Here is a copy of the progress note from your recent genetic counseling visit through the [facility] on Date: 10/6/2023.  Patient was seen through a virtual visit.    PROGRESS NOTE:Jamilah was seen for genetic counseling due to her history of nonischemic cardiomyopathy (NICM).  I had the opportunity to talk with Jamilah today to discuss the genetic component of NICM and testing options available to her .     MEDICAL HISTORY: Jamilah reports symptoms of shortness of breath and fatigue started 2-3 years ago.  After multiple hospitalizations and tests, she was ultimately diagnosed with NICM.    Echo on 6/30/22 showed a severely dilated left ventricle; mild-to-moderate left ventricular hypertrophy; severely reduced left ventricular systolic function (LVEF 30-35%), and severe global hypokinesis of the left ventricular wall segments.     More recent MRI (8/31/23) showed severely dilated LV; global systolic function is severely reduced (LVEF 18%); and severe diffuse hypokinesis consistent with non-ischemic cardiomyopathy.  In this clinical context, this is most consistent with a genetic cardiomyopathy.     History is also remarkable for type II diabetes mellitus, hypertension, and kidney problems.      FAMILY HISTORY:A detailed family history was obtained during today's consult.  Family history was significant for the following cardiac history:  Son (26) was diagnosed with cardiomyopathy at 8 years.  Maintained with medication.  Daughter (23) with anxiety and depression.  Four healthy sons.  Son (22) with anxiety.    Daughter (6)  in good health.  Sister  at 30 years in a MVA.  Father  at 53 years with a history of multiple health issues.  He developed an infection after a back surgery.    Three paternal uncles and four aunts are all  over 70 years.  Many had heart problems but no details are known.    Paternal grandmother  80-90's.  Little is known about grandfather.  Mother (60's) with no heart issues.  Maternal aunt with cardiomyopathy. No more details known.  Three maternal uncles  with coronary artery disease.  Seven other aunts/uncles with no known heart issues.    Maternal grandfather  in his sleep and had a history of heart problems.  Grandmother  70-80's with abdominal aneurysm.  There is no additional history of cardiomyopathy, arrhythmias, heart attacks, fainting, sudden cardiac death, genetic conditions, or birth defects. (A copy of pedigree may be found under media tab).    DISCUSSION: Non-ischemic cardiomyopathy results from causes other than loss of blood flow to the heart.  Cardiomyopathy can be caused by both acquired and genetic causes.  Acquired causes include exposure to toxins, injury related to coronary artery disease (ischemic), and chronic high blood pressure. When familial, it often results in dilated cardiomyopathy (DCM), where the left ventricle gets enlarged or stretched out.  There is a genetic basis found in 20-50% of DCM cases.      Reviewed autosomal dominant (AD) inheritance pattern most commonly associated with DCM, including the 50% risk for recurrence. Briefly reviewed autosomal recessive and X-linked inheritance. Explained that DCM gene mutations are associated with reduced penetrance and variable expressivity, meaning that individuals who carry a gene mutation may or may not get the disease and onset and severity can vary from one family member to the next. This explains why you may not see cardiomyopathy in each generation of a family.     Currently over 40 genes have been  found to be related to DCM. Genetic testing currently identifies mutations in about 40% of idiopathic cases. Reviewed capabilities, limitations, and logistics of testing.  DNA sample via saliva or blood is collected and sent to testing lab for evaluation of selected genes. The results could directly impact care and treatment.      Explained three possible outcomes of genetic testing including: positive identification of a mutation, no mutation identified, and identification of a variant of unknown significance (VUS). If a mutation is identified, presymptomatic testing would be available to at risk family members. If no mutation is identified, it does not rule out the possibility of a genetic component to this disease. Family members could still be at risk for developing the same condition. If a VUS is identified, it is unclear if the mutation is disease causing or just a normal variation. It may take time and possibly additional testing to determine the meaning of a VUS result.     Test results take approximately 2-4 weeks on average. Discussed cost of testing through commercial labs. Explained that the lab works with insurance to determine coverage and will contact patient if out of pocket costs are expected to exceed $100. If so, patient has the option to pay reported price, cancel testing or elect self pay pricing (typcially around $250).     Discussed pros and cons of genetic testing. Explained that results could determine the cause for dilated cardiomyopathy. If a mutation is identified, presymptomatic testing is available to all at risk relatives. Reviewed possible issues associated with presymptomatic testing including genetic discrimination, current laws to prevent discrimination (ie. ISAEL), insurance issues, and emotional and psychosocial outcomes of testing.     Explained that clinical evaluation is recommended for all first degree relatives (parents, siblings, and children) of an affected individual  regardless of decision to pursue genetic testing. Based on the Heart Failure Society of [place] Practice Guidelines (Tatyana et al, 2018), clinical evaluation should be performed at least every 3-5 years beginning and childhood and should include history, cardiac exam, ECHO, and EKG.    All questions answered at this time.     PLAN: Jamilah elected to proceed with genetic testing.  Requisition and consent forms were completed and signed.  DNA will be collected via cheek swab sample and sent to [facility]  laboratory. I will contact patient when results are available.    TOTAL TIME SPENT IN COUNSELIN minutes    Lindsay Bone MS, [facility]  Licensed, Certified Genetic Counselor  [facility]

## 2023-10-06 NOTE — NURSING NOTE
Is the patient currently in the state of [place]? NO    Visit mode:VIDEO    If the visit is dropped, the patient can be reconnected by: VIDEO VISIT: Text to cell phone:   Telephone Information:   Mobile [phone]       Will anyone else be joining the visit? Pts cousin   (If patient encounters technical issues they should call [phone]    How would you like to obtain your AVS? MyChart    Are changes needed to the allergy or medication list? No    Reason for visit: Consult    ASCENCION GarciaF

## 2023-10-22 ENCOUNTER — HEALTH MAINTENANCE LETTER (OUTPATIENT)
Age: 48
End: 2023-10-22

## 2023-10-23 LAB
SA 1 CELL: NORMAL
SA 1 TEST METHOD: NORMAL
SA 2 CELL: NORMAL
SA 2 TEST METHOD: NORMAL
SA1 HI RISK ABY: NORMAL
SA1 MOD RISK ABY: NORMAL
SA2 HI RISK ABY: NORMAL
SA2 MOD RISK ABY: NORMAL
ZZZSA 1  COMMENTS: NORMAL
ZZZSA 2 COMMENTS: NORMAL

## 2023-11-06 LAB
SCANNED LAB RESULT: NORMAL
SCANNED LAB RESULT: NORMAL

## 2023-12-31 ENCOUNTER — HEALTH MAINTENANCE LETTER (OUTPATIENT)
Age: 48
End: 2023-12-31

## 2024-02-06 ENCOUNTER — CARE COORDINATION (OUTPATIENT)
Dept: TRANSPLANT | Facility: CLINIC | Age: 49
End: 2024-02-06
Payer: MEDICAID

## 2024-02-06 NOTE — PROGRESS NOTES
Spoke with Jamilah today. States that she has been wanting to reschedule missed appointments, but unsure who to call to schedule. She reports that she missed her appointment with Dr. Saucedo due to a hospitalization but never heard back in regards to a rescheduled appointment. She states that she knows she needs to start her routine substance testing as well. She would like to move forward in scheduling her appointments. She states that she did have her repeat breast imaging ( seen in [facility]) and she does not require any additional testing ( concerns with left breast were benign). She states that she was seen at the dentist but that she still needs additional work, which she will be scheduling.  Provided Jamilah with clinic number and also reinforced utilizing MyChart as forms of communication with questions. She reports she understands

## 2024-02-09 ENCOUNTER — TELEPHONE (OUTPATIENT)
Dept: TRANSPLANT | Facility: CLINIC | Age: 49
End: 2024-02-09
Payer: MEDICAID

## 2024-02-09 DIAGNOSIS — Z76.82 ORGAN TRANSPLANT CANDIDATE: Primary | ICD-10-CM

## 2024-02-09 NOTE — TELEPHONE ENCOUNTER
I called Jamilah to see where she is having her labs done. Her voicemail was full , and would not accept new calls. I will try again 2/13/24.

## 2024-02-09 NOTE — LETTER
Courtney Jeffers  Ph#[phone]  Fx#[phone]      Jamilah Jiménez  [address]  [place] [address]    2024      Patient Name:  Jamilah Jiménez  :  1975  MRN:  [id number]   ICD10: z94.1, z79.899,z76.82    Subject:  Substance Screenings    Jamilah Jiménez is a heart transplant patient currently being followed by the [facility]. We would like to request your assistance in obtaining the following tests for the convenience of our mutual patient.    Drug screen 8 with reflex to confirmation  Nicotine and Mets, Urn, Quant  Phosphatidylethanol    Please send this information to:      [facility]   Thoracic Transplant Department  [address] [place] [address], [place] [address]    Fax Number:  [phone]    Thank you  for your continued support and the opportunity to collaborate in the care of this patient.  If you have any questions, please call the Thoracic Transplant Team at [phone] or [phone].        Dr. Jaret Saucedo  [facility] Division of Cardiology

## 2024-02-14 NOTE — TELEPHONE ENCOUNTER
I called Jamilah today, and her voicemail box is full. I was able to leave a voicemail on her home phone. I am asking Margot to see if she can be drawn for her monthly labs while at the clinic today.

## 2024-03-01 ENCOUNTER — TELEPHONE (OUTPATIENT)
Dept: TRANSPLANT | Facility: CLINIC | Age: 49
End: 2024-03-01
Payer: MEDICAID

## 2024-03-01 NOTE — TELEPHONE ENCOUNTER
Called Jamilah to reminder that she needs to reschedule her appointment with Dr Saucedo and completed the required drug test labs. Confirmed labs order were sent to [facility] in [place] and that she had the phone number for the lab and cardiology clinic.   Jamilah did acknowledge the importance of following through with these appointments, and if not it may impact her transplant candidacy.    A follow up My Chart message sent. Asked that she keep her txp team up to date on her appt status.

## 2024-04-09 ENCOUNTER — TELEPHONE (OUTPATIENT)
Dept: TRANSPLANT | Facility: CLINIC | Age: 49
End: 2024-04-09
Payer: MEDICAID

## 2024-04-09 NOTE — LETTER
Jamilah Jiménez  [address]  [place] [address]          2024      Patient Name:  Jamilah Jiménez  :  1975  MRN:  [id number]   ICD10: z94.1, z79.899    Subject:  Substance Screening    Jamilah Jiménez is a heart transplant patient currently being followed by the [facility]. We would like to request your assistance in obtaining the following tests for the convenience of our mutual patient.    Phosphatidylethanol (Peth), whole blood  Nicotine and Mets URN Quant  Medtox drug screen 8 with confirmation    Please send this information to:      [facility]   Thoracic Transplant Department  [address] [place] [address], [place] [address]    Fax Number:  [phone]    Thank you  for your continued support and the opportunity to collaborate in the care of this patient.  If you have any questions, please call the Thoracic Transplant Team at [phone] or [phone].        Dr. Jaret Saucedo  [facility] Division of Cardiology

## 2024-04-09 NOTE — TELEPHONE ENCOUNTER
I spoke with Jamilah and she will go in for her substance screening on Thursday. She has a physical therapy appointment that day. I have sent orders to [facility]

## 2024-04-10 ENCOUNTER — TELEPHONE (OUTPATIENT)
Dept: TRANSPLANT | Facility: CLINIC | Age: 49
End: 2024-04-10
Payer: MEDICAID

## 2024-04-10 NOTE — LETTER
[facility]#[phone]  #[phone]    Jamilah Jiménez  [address]  [place] [address]          April 10, 2024      Patient Name:  Jamilah Jiménez  :  1975  MRN:  [id number]   ICD10: z94.1, z79.899    Subject:  Substance Screening    Jamilah Jiménez is a heart transplant patient currently being followed by the [facility]. We would like to request your assistance in obtaining the following tests for the convenience of our mutual patient.    Nicotine and metabolites quantitative  Comprehensive urine drug screen with confirmation    Please send this information to:      [facility]   Thoracic Transplant Department  [address] [place] [address], [address]  [address]    Fax Number:  [phone]    Thank you  for your continued support and the opportunity to collaborate in the care of this patient.  If you have any questions, please call the Thoracic Transplant Team at [phone] or [phone].        Dr. Jaret Saucedo  [facility] Division of Cardiology

## 2024-04-10 NOTE — TELEPHONE ENCOUNTER
Provider Call: General  Route to LPN    Reason for call: They have questions re lab orders they received yesterday     Call back needed? Yes    Return Call Needed  Same as documented in contacts section  When to return call?: Same day: Route High Priority

## 2024-05-15 ENCOUNTER — OFFICE VISIT (OUTPATIENT)
Dept: CARDIOLOGY | Facility: OTHER | Age: 49
End: 2024-05-15
Payer: MEDICAID

## 2024-05-15 DIAGNOSIS — I42.8 NONISCHEMIC CARDIOMYOPATHY (H): Primary | ICD-10-CM

## 2024-05-15 DIAGNOSIS — I50.21 ACUTE HFREF (HEART FAILURE WITH REDUCED EJECTION FRACTION) (H): ICD-10-CM

## 2024-05-15 DIAGNOSIS — E11.9 TYPE 2 DIABETES MELLITUS WITHOUT COMPLICATION, WITH LONG-TERM CURRENT USE OF INSULIN (H): ICD-10-CM

## 2024-05-15 DIAGNOSIS — I50.20 HEART FAILURE WITH REDUCED EJECTION FRACTION, NYHA CLASS III (H): ICD-10-CM

## 2024-05-15 DIAGNOSIS — Z79.4 TYPE 2 DIABETES MELLITUS WITHOUT COMPLICATION, WITH LONG-TERM CURRENT USE OF INSULIN (H): ICD-10-CM

## 2024-05-15 DIAGNOSIS — E78.2 MIXED HYPERLIPIDEMIA: ICD-10-CM

## 2024-05-15 DIAGNOSIS — I10 BENIGN ESSENTIAL HYPERTENSION: ICD-10-CM

## 2024-05-15 DIAGNOSIS — F41.9 ANXIETY: ICD-10-CM

## 2024-05-15 PROCEDURE — G2211 COMPLEX E/M VISIT ADD ON: HCPCS | Performed by: INTERNAL MEDICINE

## 2024-05-15 PROCEDURE — 99215 OFFICE O/P EST HI 40 MIN: CPT | Performed by: INTERNAL MEDICINE

## 2024-05-15 NOTE — LETTER
5/15/2024      RE: Jamilah Jiménez  [address]  [place] [address]       Dear Colleague,    Thank you for the opportunity to participate in the care of your patient, Jamilah Jiménez, at the [facility] HEART SERVICES [place] [facility]. Please see a copy of my visit note below.    May 15, 2024     Dear Colleagues,  I had the pleasure of seeing Jamilah Jiménez  in the [facility] Failure [facility]. As you know she is a 47 year old female with HFrEF, Class III, Stage C-D, DM2, HTN, history of PE who was referred to the clinic for further evaluation and potential advanced heart failure therapies consideration. She was admitted 10/2022 following a RHC that showed cardiogenic shock with elevated filling pressures, pulmonary hypertension and low cardiac output (RA 12, PA 75/33 (48), PW 38, Amina CI 1.7). Note that she did have several ER visits over the past years.    Patient previously followed with [place] cardiology.  Had LHC in 2020 with no significant CAD reportedly.  Presented to Dr. Allen's clinic 6/1/22 and found to be in decompensated HF.  Was admitted for IV diuretics.  LVEF at that time reported 20-25%.  Transferred to [facility].  Noted to have ION and hypotension.  Was given IVF resuscitation, ION and hypotension felt to be 2/2 overdiuresis.  Limited TTE while hospitalized with LVEF 35-40% Reintroduced HF GDMT at reduced doses while hospitalized and discharged off diuretics but had diuretics uptitrated as outpatient.  Was admitted 7/15 with ION and hypokalemia.  Potassium was replaced and ION resolved.  Of note PCWP was 38 when weight was 169 lbs. She was diuresed with symptom improvement (less orthopnea and chest heaviness) however weight remained 168-170 lbs. Neg 9.8 L however I/O off with no intake documented at times. Had mild ION with aggressive diuresing however when slowed down, renal function improved. Entresto titrated up to high dose.  With her ION, spironolactone was stopped but will reintroduce at discharge at lower dose lawanda to help her hypokalemia.   She did have a stroke unfortunately recently and without area she did have some weakness in the left leg and also has some residual tingling and numbness in the left fingers.  Her strength is little bit reduced and uses a cane to walk since.  Otherwise overall no complaints or issues, energy is still low but no new complaints.  No dizziness lightheadedness or palpitations, no lower extremity edema.  Takes her medications as prescribed.     PAST MEDICAL HISTORY:  Acute on chronic systolic heart failure  Cardiogenic shock -   ION during admission  Hypokalemia  T2DM with hypergylemia  Primary HTN  Hyperlipidemia  Depression, anxiety     FAMILY HISTORY:  No relevant past family history      PAST MEDICAL HISTORY:  Past Medical History:   Diagnosis Date     Anxiety      Diabetes (H)     TYPE II     Hypertension      FAMILY HISTORY:  No family history on file.     SOCIAL HISTORY:  Social History     Socioeconomic History     Marital status: Single   Tobacco Use     Smoking status: Never     Smokeless tobacco: Never   Vaping Use     Vaping status: Never Used   Substance and Sexual Activity     Alcohol use: Not Currently     Drug use: Not Currently     Social Determinants of Health     Financial Resource Strain: Low Risk  (12/13/2023)    Received from [facility]    Overall Financial Resource Strain (CARDIA)      Difficulty of Paying Living Expenses: Not very hard   Food Insecurity: No Food Insecurity (12/13/2023)    Received from [facility]    Hunger Vital Sign      Worried About Running Out of Food in the Last Year: Never true      Ran Out of Food in the Last Year: Never true   Transportation Needs: No Transportation Needs (12/13/2023)    Received from [facility]    PRAPARE - Transportation      Lack of Transportation (Medical): No      Lack  of Transportation (Non-Medical): No   Physical Activity: Unknown (5/21/2021)    Received from [facility]    Exercise Vital Sign      Days of Exercise per Week: Patient declined      Minutes of Exercise per Session: Patient declined   Stress: Unknown (5/21/2021)    Received from [facility]    [employer] - Occupational Stress Questionnaire      Feeling of Stress : Patient declined   Social Connections: Unknown (5/21/2021)    Received from [facility]    Social Connection and Isolation Panel [NHANES]      Frequency of Communication with Friends and Family: Patient declined      Frequency of Social Gatherings with Friends and Family: Patient declined      Attends Rastafarian Services: Patient declined      Active Member of Clubs or Organizations: Patient declined      Attends Club or Organization Meetings: Patient declined      Marital Status: Patient declined   Interpersonal Safety: Unknown (5/21/2021)    Received from [facility]    Humiliation, Afraid, Rape, and Kick questionnaire      Fear of Current or Ex-Partner: Patient declined      Emotionally Abused: Patient declined      Physically Abused: Patient declined      Sexually Abused: Patient declined   Housing Stability: Low Risk  (12/13/2023)    Received from [facility]    Housing Stability Vital Sign      Unable to Pay for Housing in the Last Year: No      Number of Places Lived in the Last Year: 1      In the last 12 months, was there a time when you did not have a steady place to sleep or slept in a shelter (including now)?: No     CURRENT MEDICATIONS:  Current Outpatient Medications   Medication Sig Dispense Refill     atorvastatin (LIPITOR) 80 MG tablet 1 tablet (80 mg) by Oral or Feeding Tube route At Bedtime 30 tablet 0     dapagliflozin (FARXIGA) 10 MG TABS tablet 1 tablet (10 mg) by Oral or Feeding Tube route every morning 90 tablet 1      folic acid (FOLVITE) 1 MG tablet Take 1 tablet (1 mg) by mouth daily 30 tablet 0     hydrOXYzine (ATARAX) 25 MG tablet Take 1 tablet (25 mg) by mouth every 6 hours as needed for anxiety (adjuvant pain) 30 tablet 0     insulin aspart (NOVOLOG FLEXPEN) 100 UNIT/ML pen Inject 1-10 Units Subcutaneous 4 times daily (with meals and nightly) SLIDING SCALE       insulin detemir (LEVEMIR PEN) 100 UNIT/ML pen Inject 40 Units Subcutaneous At Bedtime 15 mL 0     metoprolol succinate ER (TOPROL XL) 25 MG 24 hr tablet Take 1 tablet (25 mg) by mouth daily 30 tablet 0     nitroGLYcerin (NITROSTAT) 0.4 MG sublingual tablet For chest pain place 1 tablet under the tongue every 5 minutes for 3 doses. If symptoms persist 5 minutes after 1st dose call 911. 30 tablet 0     potassium chloride ER (KLOR-CON M) 20 MEQ CR tablet Take 1 tablet (20 mEq) by mouth 2 times daily 60 tablet 0     sacubitril-valsartan (ENTRESTO) 49-51 MG per tablet Take 1 tablet by mouth 2 times daily 60 tablet 0     sertraline (ZOLOFT) 50 MG tablet 3 tablets (150 mg) by Oral or Feeding Tube route daily 30 tablet 0     spironolactone (ALDACTONE) 25 MG tablet 1 tablet (25 mg) by Oral or Feeding Tube route every morning 30 tablet 0     torsemide (DEMADEX) 20 MG tablet Take 100 mg by mouth 2 times daily       traZODone (DESYREL) 50 MG tablet Take 1 tablet (50 mg) by mouth nightly as needed for sleep 30 tablet 0     No current facility-administered medications for this visit.     EXAM:  Blood pressure today is 132/60 mmHg, heart rate is 72 bpm and weight is 190.6 pounds  General: appears comfortable, alert and oriented  Head: normocephalic, atraumatic  Eyes: anicteric sclera, EOMI , PERRL  Neck: no adenopathy  Orophyarynx: moist mucosa, no lesions noted  Heart: regular, S1/S2, no murmurs, rubs or gallop. Estimated JVP at 5 cmH2O  Lungs: CTAB, No wheezing.   Abdomen: soft, non-tender, bowel sounds present, no hepatosplenomegaly  Extremities: No LE edema today  Skin: no  open lesions noted  Neuro: grossly non-focal     Labs:  Lab Results   Component Value Date    WBC 3.5 (L) 09/07/2023    HGB 11.2 (L) 09/07/2023    HCT 32.8 (L) 09/07/2023     09/07/2023     09/11/2023    POTASSIUM 4.2 09/11/2023    CHLORIDE 110 (H) 09/11/2023    CO2 22 09/11/2023    BUN 5.8 (L) 09/11/2023    CR 0.96 (H) 09/11/2023     (H) 09/11/2023    NTBNPI 1,425 (H) 08/31/2023    AST 23 09/02/2023    ALT 13 09/02/2023    ALKPHOS 93 09/02/2023    BILITOTAL 1.0 09/02/2023    INR 1.15 09/03/2023      TTE 6/30/22:  Left Ventricle: The left ventricle is severely dilated. There is  mild-to-moderate left ventricular hypertrophy. Severely reduced left ventricular systolic function. The EF is visually estimated to be 30-35%. Severe global hypokinesis of the left ventricular wall segments.   Right Ventricle: The right ventricle appears normal in size. Systolic function is normal.   Mitral Valve: There is mild regurgitation.   IVC/SVC: Normal IVC size with normal respirophasic changes. Pulmonic Artery: There is no pulmonary hypertension.      TTE 10/6/22:  Left Ventricle: The left ventricle is severely dilated. There is mild-to-moderate left ventricular hypertrophy. The EF is visually estimated to be 30-35%. Severe global hypokinesis of the left ventricular wall segments. LVEDD 6.2cm     RHC 8/31/23:  RA: 5/6/4mmHg  RV: 34/4mmHg  PA: 34/14/20mmHg  W: 14/14/10mmHg  Amina CO/CI: 5.35/2.82  Thermo CO/CI: 7.6/4  PVR: 2.2 Right sided filling pressures are normal. Left sided filling pressures are normal. Normal PA pressures. Normal cardiac output level.    TTE 10/8/23:     Left Ventricle: Severely reduced left ventricular systolic function. The ejection fraction, measured by biplane, is 26%. Global hypokinesis of the left ventricular wall segments. Grade III diastolic dysfunction.      Right Ventricle: The right ventricle appears normal in size. The RVSP measures 34 mmHg. There is mild pulmonary hypertension.       Aortic Valve: The aortic valve is tricuspid.      Mitral Valve: Mitral valve structure is normal.      Aorta: The sinus of Valsalva is normal. The ascending aorta is normal.      Pericardium: There is no pericardial effusion.      Tricuspid Valve: Tricuspid valve structure is normal.      Left Atrium: The left atrium is mildly dilated.     TTE 12/23/23:     Left Ventricle: The left ventricle is severely dilated. Wall thickness is normal. Severely reduced left ventricular systolic function. The EF is visually estimated to be 30-35%. Severe global hypokinesis of the left ventricular wall segments. Left ventricular filling pressure is normal.      Right Ventricle: The right ventricle appears normal in size. Systolic function is normal.      Left Atrium: The left atrium is normal in size.      Mitral Valve: There is trace regurgitation.      Tricuspid Valve: There is trace regurgitation.      Pulmonic Artery: There is no pulmonary hypertension.      Aortic Valve: There is trace regurgitation.     ASSESSMENT AND PLAN:  In summary, patient is a 48 year old very with above past medical history including recent admission for cardiogenic shock with very significant elevated wedge pressure 38 mmHg and reduced cardiac index who was referred for further evaluation for potential advanced heart failure therapies.    We did have some evaluation at the [facility] with right heart catheterization as well as echocardiograms performed.  Echo showed improved ejection fraction as discussed above as well as the right heart catheterization showed essentially normal filling pressures and normal cardiac index.  With this we decided to continue medical therapy at this time.  That she suffered unfortunate stroke in between but hopefully this will have minimal residual effects and she continues to improve.  Today we would only recommend increasing the Entresto to 97 mg twice daily something she has been taking is 49 mg twice  daily dosing.  In addition little for high blood pressure values are both recommend discontinuing the amlodipine at this time.  She is also had lab work done today and results are pending and not available at this moment as we reviewed.  No other medication changes we will monitor closely and I will see her back in 5 months or sooner as needed.    I appreciate the opportunity to participate in the care of Jamilah Jiménez . Please do not hesitate to contact me with any further questions.  I have spent a total of 40 minutes today reviewing labs, imaging studies, discussing with colleagues, face-to-face time with patient and documentation in the medical record.  The longitudinal plan of care for the diagnosis(es)/condition(s) as documented were addressed during this visit. Due to the added complexity in care, I will continue to support Jamilah in the subsequent management and with ongoing continuity of care.    Sincerely,   Jaret Saucedo MD  [facility] Division of Cardiology         Please do not hesitate to contact me if you have any questions/concerns.     Sincerely,     Jaret Saucedo MD

## 2024-05-15 NOTE — PROGRESS NOTES
May 15, 2024     Dear Colleagues,  I had the pleasure of seeing Jamilah Jiménez  in the [facility]. As you know she is a 47 year old female with HFrEF, Class III, Stage C-D, DM2, HTN, history of PE who was referred to the clinic for further evaluation and potential advanced heart failure therapies consideration. She was admitted 10/2022 following a RHC that showed cardiogenic shock with elevated filling pressures, pulmonary hypertension and low cardiac output (RA 12, PA 75/33 (48), PW 38, Amina CI 1.7). Note that she did have several ER visits over the past years.    Patient previously followed with [facility].  Had LHC in 2020 with no significant CAD reportedly.  Presented to Dr. Allen's clinic 6/1/22 and found to be in decompensated HF.  Was admitted for IV diuretics.  LVEF at that time reported 20-25%.  Transferred to [facility].  Noted to have ION and hypotension.  Was given IVF resuscitation, ION and hypotension felt to be 2/2 overdiuresis.  Limited TTE while hospitalized with LVEF 35-40% Reintroduced HF GDMT at reduced doses while hospitalized and discharged off diuretics but had diuretics uptitrated as outpatient.  Was admitted 7/15 with ION and hypokalemia.  Potassium was replaced and ION resolved.  Of note PCWP was 38 when weight was 169 lbs. She was diuresed with symptom improvement (less orthopnea and chest heaviness) however weight remained 168-170 lbs. Neg 9.8 L however I/O off with no intake documented at times. Had mild ION with aggressive diuresing however when slowed down, renal function improved. Entresto titrated up to high dose. With her ION, spironolactone was stopped but will reintroduce at discharge at lower dose lawanda to help her hypokalemia.   She did have a stroke unfortunately recently and without area she did have some weakness in the left leg and also has some residual tingling and numbness in the left fingers.  Her strength is little bit reduced and uses a  cane to walk since.  Otherwise overall no complaints or issues, energy is still low but no new complaints.  No dizziness lightheadedness or palpitations, no lower extremity edema.  Takes her medications as prescribed.     PAST MEDICAL HISTORY:  Acute on chronic systolic heart failure  Cardiogenic shock -   ION during admission  Hypokalemia  T2DM with hypergylemia  Primary HTN  Hyperlipidemia  Depression, anxiety     FAMILY HISTORY:  No relevant past family history      PAST MEDICAL HISTORY:  Past Medical History:   Diagnosis Date    Anxiety     Diabetes (H)     TYPE II    Hypertension      FAMILY HISTORY:  No family history on file.     SOCIAL HISTORY:  Social History     Socioeconomic History    Marital status: Single   Tobacco Use    Smoking status: Never    Smokeless tobacco: Never   Vaping Use    Vaping status: Never Used   Substance and Sexual Activity    Alcohol use: Not Currently    Drug use: Not Currently     Social Determinants of Health     Financial Resource Strain: Low Risk  (12/13/2023)    Received from [facility]    Overall Financial Resource Strain (CARDIA)     Difficulty of Paying Living Expenses: Not very hard   Food Insecurity: No Food Insecurity (12/13/2023)    Received from [facility]    Hunger Vital Sign     Worried About Running Out of Food in the Last Year: Never true     Ran Out of Food in the Last Year: Never true   Transportation Needs: No Transportation Needs (12/13/2023)    Received from [facility]    PRAPARE - Transportation     Lack of Transportation (Medical): No     Lack of Transportation (Non-Medical): No   Physical Activity: Unknown (5/21/2021)    Received from [facility]    Exercise Vital Sign     Days of Exercise per Week: Patient declined     Minutes of Exercise per Session: Patient declined   Stress: Unknown (5/21/2021)    Received from [facility]  Occupational Health - Occupational Stress Questionnaire     Feeling of Stress : Patient declined   Social Connections: Unknown (5/21/2021)    Received from [facility]    Social Connection and Isolation Panel [NHANES]     Frequency of Communication with Friends and Family: Patient declined     Frequency of Social Gatherings with Friends and Family: Patient declined     Attends Church Services: Patient declined     Active Member of Clubs or Organizations: Patient declined     Attends Club or Organization Meetings: Patient declined     Marital Status: Patient declined   Interpersonal Safety: Unknown (5/21/2021)    Received from [facility]    Humiliation, Afraid, Rape, and Kick questionnaire     Fear of Current or Ex-Partner: Patient declined     Emotionally Abused: Patient declined     Physically Abused: Patient declined     Sexually Abused: Patient declined   Housing Stability: Low Risk  (12/13/2023)    Received from [facility]    Housing Stability Vital Sign     Unable to Pay for Housing in the Last Year: No     Number of Places Lived in the Last Year: 1     In the last 12 months, was there a time when you did not have a steady place to sleep or slept in a shelter (including now)?: No     CURRENT MEDICATIONS:  Current Outpatient Medications   Medication Sig Dispense Refill    atorvastatin (LIPITOR) 80 MG tablet 1 tablet (80 mg) by Oral or Feeding Tube route At Bedtime 30 tablet 0    dapagliflozin (FARXIGA) 10 MG TABS tablet 1 tablet (10 mg) by Oral or Feeding Tube route every morning 90 tablet 1    folic acid (FOLVITE) 1 MG tablet Take 1 tablet (1 mg) by mouth daily 30 tablet 0    hydrOXYzine (ATARAX) 25 MG tablet Take 1 tablet (25 mg) by mouth every 6 hours as needed for anxiety (adjuvant pain) 30 tablet 0    insulin aspart (NOVOLOG FLEXPEN) 100 UNIT/ML pen Inject 1-10 Units Subcutaneous 4 times daily (with meals and nightly) SLIDING SCALE      insulin  detemir (LEVEMIR PEN) 100 UNIT/ML pen Inject 40 Units Subcutaneous At Bedtime 15 mL 0    metoprolol succinate ER (TOPROL XL) 25 MG 24 hr tablet Take 1 tablet (25 mg) by mouth daily 30 tablet 0    nitroGLYcerin (NITROSTAT) 0.4 MG sublingual tablet For chest pain place 1 tablet under the tongue every 5 minutes for 3 doses. If symptoms persist 5 minutes after 1st dose call 911. 30 tablet 0    potassium chloride ER (KLOR-CON M) 20 MEQ CR tablet Take 1 tablet (20 mEq) by mouth 2 times daily 60 tablet 0    sacubitril-valsartan (ENTRESTO) 49-51 MG per tablet Take 1 tablet by mouth 2 times daily 60 tablet 0    sertraline (ZOLOFT) 50 MG tablet 3 tablets (150 mg) by Oral or Feeding Tube route daily 30 tablet 0    spironolactone (ALDACTONE) 25 MG tablet 1 tablet (25 mg) by Oral or Feeding Tube route every morning 30 tablet 0    torsemide (DEMADEX) 20 MG tablet Take 100 mg by mouth 2 times daily      traZODone (DESYREL) 50 MG tablet Take 1 tablet (50 mg) by mouth nightly as needed for sleep 30 tablet 0     No current facility-administered medications for this visit.     EXAM:  Blood pressure today is 132/60 mmHg, heart rate is 72 bpm and weight is 190.6 pounds  General: appears comfortable, alert and oriented  Head: normocephalic, atraumatic  Eyes: anicteric sclera, EOMI , PERRL  Neck: no adenopathy  Orophyarynx: moist mucosa, no lesions noted  Heart: regular, S1/S2, no murmurs, rubs or gallop. Estimated JVP at 5 cmH2O  Lungs: CTAB, No wheezing.   Abdomen: soft, non-tender, bowel sounds present, no hepatosplenomegaly  Extremities: No LE edema today  Skin: no open lesions noted  Neuro: grossly non-focal     Labs:  Lab Results   Component Value Date    WBC 3.5 (L) 09/07/2023    HGB 11.2 (L) 09/07/2023    HCT 32.8 (L) 09/07/2023     09/07/2023     09/11/2023    POTASSIUM 4.2 09/11/2023    CHLORIDE 110 (H) 09/11/2023    CO2 22 09/11/2023    BUN 5.8 (L) 09/11/2023    CR 0.96 (H) 09/11/2023     (H) 09/11/2023     NTBNPI 1,425 (H) 08/31/2023    AST 23 09/02/2023    ALT 13 09/02/2023    ALKPHOS 93 09/02/2023    BILITOTAL 1.0 09/02/2023    INR 1.15 09/03/2023      TTE 6/30/22:  Left Ventricle: The left ventricle is severely dilated. There is  mild-to-moderate left ventricular hypertrophy. Severely reduced left ventricular systolic function. The EF is visually estimated to be 30-35%. Severe global hypokinesis of the left ventricular wall segments.   Right Ventricle: The right ventricle appears normal in size. Systolic function is normal.   Mitral Valve: There is mild regurgitation.   IVC/SVC: Normal IVC size with normal respirophasic changes. Pulmonic Artery: There is no pulmonary hypertension.      TTE 10/6/22:  Left Ventricle: The left ventricle is severely dilated. There is mild-to-moderate left ventricular hypertrophy. The EF is visually estimated to be 30-35%. Severe global hypokinesis of the left ventricular wall segments. LVEDD 6.2cm     RHC 8/31/23:  RA: 5/6/4mmHg  RV: 34/4mmHg  PA: 34/14/20mmHg  W: 14/14/10mmHg  Amina CO/CI: 5.35/2.82  Thermo CO/CI: 7.6/4  PVR: 2.2 Right sided filling pressures are normal. Left sided filling pressures are normal. Normal PA pressures. Normal cardiac output level.    TTE 10/8/23:    Left Ventricle: Severely reduced left ventricular systolic function. The ejection fraction, measured by biplane, is 26%. Global hypokinesis of the left ventricular wall segments. Grade III diastolic dysfunction.     Right Ventricle: The right ventricle appears normal in size. The RVSP measures 34 mmHg. There is mild pulmonary hypertension.     Aortic Valve: The aortic valve is tricuspid.     Mitral Valve: Mitral valve structure is normal.     Aorta: The sinus of Valsalva is normal. The ascending aorta is normal.     Pericardium: There is no pericardial effusion.     Tricuspid Valve: Tricuspid valve structure is normal.     Left Atrium: The left atrium is mildly dilated.     TTE 12/23/23:    Left Ventricle: The  left ventricle is severely dilated. Wall thickness is normal. Severely reduced left ventricular systolic function. The EF is visually estimated to be 30-35%. Severe global hypokinesis of the left ventricular wall segments. Left ventricular filling pressure is normal.     Right Ventricle: The right ventricle appears normal in size. Systolic function is normal.     Left Atrium: The left atrium is normal in size.     Mitral Valve: There is trace regurgitation.     Tricuspid Valve: There is trace regurgitation.     Pulmonic Artery: There is no pulmonary hypertension.     Aortic Valve: There is trace regurgitation.     ASSESSMENT AND PLAN:  In summary, patient is a 48 year old very with above past medical history including recent admission for cardiogenic shock with very significant elevated wedge pressure 38 mmHg and reduced cardiac index who was referred for further evaluation for potential advanced heart failure therapies.    We did have some evaluation at the [facility] with right heart catheterization as well as echocardiograms performed.  Echo showed improved ejection fraction as discussed above as well as the right heart catheterization showed essentially normal filling pressures and normal cardiac index.  With this we decided to continue medical therapy at this time.  That she suffered unfortunate stroke in between but hopefully this will have minimal residual effects and she continues to improve.  Today we would only recommend increasing the Entresto to 97 mg twice daily something she has been taking is 49 mg twice daily dosing.  In addition little for high blood pressure values are both recommend discontinuing the amlodipine at this time.  She is also had lab work done today and results are pending and not available at this moment as we reviewed.  No other medication changes we will monitor closely and I will see her back in 5 months or sooner as needed.    I appreciate the opportunity to participate  in the care of Jamilah Jiménez . Please do not hesitate to contact me with any further questions.  I have spent a total of 40 minutes today reviewing labs, imaging studies, discussing with colleagues, face-to-face time with patient and documentation in the medical record.  The longitudinal plan of care for the diagnosis(es)/condition(s) as documented were addressed during this visit. Due to the added complexity in care, I will continue to support Jamilah in the subsequent management and with ongoing continuity of care.    Sincerely,   Jaret Saucedo MD  [facility] Division of Cardiology

## 2024-05-16 NOTE — NURSING NOTE
Chief Complaint   Patient presents with    Follow Up       Rooming of patient has been done by Sherwin COLÓN and can be viewed in Care Everywhere. This includes medication review, assessments and vitals.    Kwabena Martinez CMA ([facility])  Advanced Heart Failure

## 2024-05-16 NOTE — PATIENT INSTRUCTIONS
You were seen today by [facility] Advanced Heart Failure Cardiologist, Dr. Saucedo, in partnership with [facility] in [place], [place]       Medication and Care Instructions:    Stop amlodipine  Increase Entresto to 97/103mg 2 times a day    Follow Up Appointments:    Lab work to be done locally in 1 week  October follow up with Dr. Saucedo in [place]      Questions:    For concerns or questions regarding your heart care please contact your [facility] at [phone].     If there are any questions specifically about this visit please call  [facility] at  [phone].    Follow the American Heart Association Diet and Lifestyle recommendations:    Limit saturated fat, trans fat, sodium, red meat, sweets and sugar-sweetened beverages.   If you choose to eat red meat, compare labels and select the leanest cuts available.  Aim for at least 150 minutes of moderate physical activity or 75 minutes of vigorous physical activity - or an equal combination of both - each week.

## 2024-05-19 ENCOUNTER — HEALTH MAINTENANCE LETTER (OUTPATIENT)
Age: 49
End: 2024-05-19

## 2024-07-19 NOTE — PROGRESS NOTES
NAME  Jamilah Jiménez    MRN  [id number]      [birth year]     AGE  47     SEX  Female     HANDEDNESS Right     EDUCATION 11     ARNOLD  23     PROVIDER  [employer]  AJ     STATION  IP            ORIENTATION      Time  0     Place      Personal Info.     Presidents                           WRAT   5     SS %ile GE   Word Reading 89 23 10.2          WASI-II       FSIQ: 67        Raw T    Vocabulary  31 41    Matrix Reasoning 2 20            BOSTON NAMING TEST     Raw 47 /60     ScS 7      T 38              COWAT   FAS   Raw 43      ScS 11      T 52             TRAIL MAKING TEST       Time Errors ScS T   A 31 0 9 49   B 197 4 5 29          ILS HEALTH & SAFETY      Raw 35      T 50              RBANS   A     Raw ScS/%ile    List Learning 27 10    Story Memory 15 8    Figure Copy 18 10    Line Orientation 15 26-50    Picture Naming 9 17-25    Semantic Fluency 16 7    Digit Span  10 9    Coding  45 8    List Recall  4 10-16    List Recognition 18 3-9    Story Recall 8 8    Figure Recall 14 9             Index     Immediate Mem. 94     Visuospat./Constr. 92     Language  91     Attention  91     Delayed Mem. 81     Total Scale 85            BDI-II       Raw 15      Interp. Mild             STAI        Raw %ile Interpretation   State: 35 55 Normal    Trait:  27 22 Low Normal          MMPI-3       RCd 51 CRIN 57    RC1 55 VRIN 43    RC2 54 LUIS ANTONIO 50    RC4 44 F 41    RC6 60 Fp 58    RC7 53 Fs 42    RC8 37 FBS 56    RC9 44 RBS 58      L 65      K 38       No

## 2024-10-03 ENCOUNTER — OFFICE VISIT (OUTPATIENT)
Dept: CARDIOLOGY | Facility: OTHER | Age: 49
End: 2024-10-03
Payer: MEDICAID

## 2024-10-03 DIAGNOSIS — I50.21 ACUTE HFREF (HEART FAILURE WITH REDUCED EJECTION FRACTION) (H): ICD-10-CM

## 2024-10-03 DIAGNOSIS — I10 BENIGN ESSENTIAL HYPERTENSION: ICD-10-CM

## 2024-10-03 DIAGNOSIS — I42.8 NONISCHEMIC CARDIOMYOPATHY (H): ICD-10-CM

## 2024-10-03 DIAGNOSIS — I50.20 HEART FAILURE WITH REDUCED EJECTION FRACTION, NYHA CLASS III (H): Primary | ICD-10-CM

## 2024-10-03 DIAGNOSIS — F41.9 ANXIETY: ICD-10-CM

## 2024-10-03 DIAGNOSIS — E78.2 MIXED HYPERLIPIDEMIA: ICD-10-CM

## 2024-10-03 PROCEDURE — G2211 COMPLEX E/M VISIT ADD ON: HCPCS | Performed by: INTERNAL MEDICINE

## 2024-10-03 PROCEDURE — 99215 OFFICE O/P EST HI 40 MIN: CPT | Performed by: INTERNAL MEDICINE

## 2024-10-03 NOTE — LETTER
10/3/2024      RE: Jamilah Jiménez  [address]  [place] [address]       Dear Colleague,    Thank you for the opportunity to participate in the care of your patient, Jamilah Jiménez, at the [facility] [place] [facility]. Please see a copy of my visit note below.    October 2, 2024     Dear Colleagues,  I had the pleasure of seeing Jamilah Jiménez  in the [facility]. As you know she is a 48 year old female with HFrEF, Class III, Stage C-D, DM2, HTN, history of PE who was referred to the clinic for further evaluation and potential advanced heart failure therapies consideration. She was admitted 10/2022 following a RHC that showed cardiogenic shock with elevated filling pressures, pulmonary hypertension and low cardiac output (RA 12, PA 75/33 (48), PW 38, Amina CI 1.7). Note that she did have several ER visits over the past years.    Patient previously followed with [facility].  Had LHC in 2020 with no significant CAD reportedly.  Presented to Dr. Allen's clinic 6/1/22 and found to be in decompensated HF.  Was admitted for IV diuretics.  LVEF at that time reported 20-25%.  Transferred to [facility].  Noted to have ION and hypotension.  Was given IVF resuscitation, ION and hypotension felt to be 2/2 overdiuresis.  Limited TTE while hospitalized with LVEF 35-40% Reintroduced HF GDMT at reduced doses while hospitalized and discharged off diuretics but had diuretics uptitrated as outpatient.  Was admitted 7/15 with ION and hypokalemia.  Potassium was replaced and ION resolved.  Of note PCWP was 38 when weight was 169 lbs. She was diuresed with symptom improvement (less orthopnea and chest heaviness) however weight remained 168-170 lbs. Neg 9.8 L however I/O off with no intake documented at times. Had mild ION with aggressive diuresing however when slowed down, renal function improved. Entresto titrated up to high  dose. With her ION, spironolactone was stopped but will reintroduce at discharge at lower dose lawanda to help her hypokalemia.   She did have a stroke unfortunately recently and without area she did have some weakness in the left leg and also has some residual tingling and numbness in the left fingers.  Her strength is little bit reduced and uses a cane to walk since.   She is here for here for follow-up today.  She notes overall she is doing well however unfortunately she did bring her grandchild who is 3-month old for a routine check however it turned out that he had multiple issues including potential brain bleed as well as renal failure.  The details are pending at this time but obviously she is very upset and nervous about the situation.  Nevertheless it is very respectful that she came to the visit still.  Denies any new complaints her blood pressure at home has been running normal, understandably blood pressure here was elevated.  But no other significant issues    PAST MEDICAL HISTORY:  Acute on chronic systolic heart failure  Cardiogenic shock -   ION during admission  Hypokalemia  T2DM with hypergylemia  Primary HTN  Hyperlipidemia  Depression, anxiety     FAMILY HISTORY:  No relevant past family history      PAST MEDICAL HISTORY:  Past Medical History:   Diagnosis Date     Anxiety      Diabetes (H)     TYPE II     Hypertension      FAMILY HISTORY:  No family history on file.  SOCIAL HISTORY:  Social History     Socioeconomic History     Marital status: Single   Tobacco Use     Smoking status: Never     Smokeless tobacco: Never   Vaping Use     Vaping status: Never Used   Substance and Sexual Activity     Alcohol use: Not Currently     Drug use: Not Currently     Social Determinants of Health     Financial Resource Strain: Low Risk  (12/13/2023)    [facility] from [facility], [facility]    Overall Financial Resource Strain (CARDI)      Difficulty of Paying Living  Expenses: Not very hard   Food Insecurity: No Food Insecurity (12/13/2023)    Received from [facility], [facility]    Hunger Vital Sign      Worried About Running Out of Food in the Last Year: Never true      Ran Out of Food in the Last Year: Never true   Transportation Needs: No Transportation Needs (12/13/2023)    Received from [facility], [facility]    PRAPARE - Transportation      Lack of Transportation (Medical): No      Lack of Transportation (Non-Medical): No   Physical Activity: Unknown (5/21/2021)    Received from [facility], [facility]    Exercise Vital Sign      Days of Exercise per Week: Patient declined      Minutes of Exercise per Session: Patient declined   Stress: Unknown (5/21/2021)    Received from [facility], [facility]    [employer] - Occupational Stress Questionnaire      Feeling of Stress : Patient declined   Social Connections: Unknown (5/21/2021)    Received from [facility], [facility]    Social Connection and Isolation Panel [NHANES]      Frequency of Communication with Friends and Family: Patient declined      Frequency of Social Gatherings with Friends and Family: Patient declined      Attends [employer] Services: Patient declined      Active Member of Clubs or Organizations: Patient declined      Attends Club or Organization Meetings: Patient declined      Marital Status: Patient declined   Interpersonal Safety: Unknown (5/21/2021)    Received from [facility], [facility]    Humiliation, Afraid, Rape, and Kick questionnaire      Fear of Current or Ex-Partner: Patient declined      Emotionally Abused: Patient declined      Physically Abused: Patient declined      Sexually Abused: Patient declined   Housing Stability: Low Risk   (12/13/2023)    Received from [facility], [facility]    Housing Stability Vital Sign      Unable to Pay for Housing in the Last Year: No      Number of Places Lived in the Last Year: 1      Unstable Housing in the Last Year: No     CURRENT MEDICATIONS:  Current Outpatient Medications   Medication Sig Dispense Refill     atorvastatin (LIPITOR) 80 MG tablet 1 tablet (80 mg) by Oral or Feeding Tube route At Bedtime 30 tablet 0     dapagliflozin (FARXIGA) 10 MG TABS tablet 1 tablet (10 mg) by Oral or Feeding Tube route every morning 90 tablet 1     folic acid (FOLVITE) 1 MG tablet Take 1 tablet (1 mg) by mouth daily 30 tablet 0     hydrOXYzine (ATARAX) 25 MG tablet Take 1 tablet (25 mg) by mouth every 6 hours as needed for anxiety (adjuvant pain) 30 tablet 0     insulin aspart (NOVOLOG FLEXPEN) 100 UNIT/ML pen Inject 1-10 Units Subcutaneous 4 times daily (with meals and nightly) SLIDING SCALE       insulin detemir (LEVEMIR PEN) 100 UNIT/ML pen Inject 40 Units Subcutaneous At Bedtime 15 mL 0     metoprolol succinate ER (TOPROL XL) 25 MG 24 hr tablet Take 1 tablet (25 mg) by mouth daily 30 tablet 0     nitroGLYcerin (NITROSTAT) 0.4 MG sublingual tablet For chest pain place 1 tablet under the tongue every 5 minutes for 3 doses. If symptoms persist 5 minutes after 1st dose call 911. 30 tablet 0     potassium chloride ER (KLOR-CON M) 20 MEQ CR tablet Take 1 tablet (20 mEq) by mouth 2 times daily 60 tablet 0     sacubitril-valsartan (ENTRESTO) 49-51 MG per tablet Take 1 tablet by mouth 2 times daily 60 tablet 0     sertraline (ZOLOFT) 50 MG tablet 3 tablets (150 mg) by Oral or Feeding Tube route daily 30 tablet 0     spironolactone (ALDACTONE) 25 MG tablet 1 tablet (25 mg) by Oral or Feeding Tube route every morning 30 tablet 0     torsemide (DEMADEX) 20 MG tablet Take 100 mg by mouth 2 times daily       traZODone (DESYREL) 50 MG tablet Take 1 tablet (50 mg) by mouth nightly as needed for  sleep 30 tablet 0     No current facility-administered medications for this visit.       EXAM:  Blood pressure today is 180/92 mmHg again understandably.  Heart rate is 86 bpm weight is 177 pounds.  General: appears comfortable, alert and oriented  Head: normocephalic, atraumatic  Eyes: anicteric sclera, EOMI , PERRL  Neck: no adenopathy  Orophyarynx: moist mucosa, no lesions noted  Heart: regular, S1/S2, no murmurs, rubs or gallop. Estimated JVP at 5 cmH2O  Lungs: CTAB, No wheezing.   Abdomen: soft, non-tender, bowel sounds present, no hepatosplenomegaly  Extremities: No LE edema today  Skin: no open lesions noted  Neuro: grossly non-focal     Labs:  Lab Results   Component Value Date    WBC 3.5 (L) 09/07/2023    HGB 11.2 (L) 09/07/2023    HCT 32.8 (L) 09/07/2023     09/07/2023     09/11/2023    POTASSIUM 4.2 09/11/2023    CHLORIDE 110 (H) 09/11/2023    CO2 22 09/11/2023    BUN 5.8 (L) 09/11/2023    CR 0.96 (H) 09/11/2023     (H) 09/11/2023    NTBNPI 1,425 (H) 08/31/2023    AST 23 09/02/2023    ALT 13 09/02/2023    ALKPHOS 93 09/02/2023    BILITOTAL 1.0 09/02/2023    INR 1.15 09/03/2023      TTE 6/30/22:  Left Ventricle: The left ventricle is severely dilated. There is  mild-to-moderate left ventricular hypertrophy. Severely reduced left ventricular systolic function. The EF is visually estimated to be 30-35%. Severe global hypokinesis of the left ventricular wall segments.   Right Ventricle: The right ventricle appears normal in size. Systolic function is normal.   Mitral Valve: There is mild regurgitation.   IVC/SVC: Normal IVC size with normal respirophasic changes. Pulmonic Artery: There is no pulmonary hypertension.       TTE 10/6/22:  Left Ventricle: The left ventricle is severely dilated. There is mild-to-moderate left ventricular hypertrophy. The EF is visually estimated to be 30-35%. Severe global hypokinesis of the left ventricular wall segments. LVEDD 6.2cm     [facility] 8/31/23:  RA:  5/6/4mmHg  RV: 34/4mmHg  PA: 34/14/20mmHg  W: 14/14/10mmHg  Amina CO/CI: 5.35/2.82  Thermo CO/CI: 7.6/4  PVR: 2.2 Right sided filling pressures are normal. Left sided filling pressures are normal. Normal PA pressures. Normal cardiac output level.     TTE 10/8/23:     Left Ventricle: Severely reduced left ventricular systolic function. The ejection fraction, measured by biplane, is 26%. Global hypokinesis of the left ventricular wall segments. Grade III diastolic dysfunction.      Right Ventricle: The right ventricle appears normal in size. The RVSP measures 34 mmHg. There is mild pulmonary hypertension.      Aortic Valve: The aortic valve is tricuspid.      Mitral Valve: Mitral valve structure is normal.      Aorta: The sinus of Valsalva is normal. The ascending aorta is normal.      Pericardium: There is no pericardial effusion.      Tricuspid Valve: Tricuspid valve structure is normal.      Left Atrium: The left atrium is mildly dilated.      TTE 12/23/23:     Left Ventricle: The left ventricle is severely dilated. Wall thickness is normal. Severely reduced left ventricular systolic function. The EF is visually estimated to be 30-35%. Severe global hypokinesis of the left ventricular wall segments. Left ventricular filling pressure is normal.      Right Ventricle: The right ventricle appears normal in size. Systolic function is normal.      Left Atrium: The left atrium is normal in size.      Mitral Valve: There is trace regurgitation.      Tricuspid Valve: There is trace regurgitation.      Pulmonic Artery: There is no pulmonary hypertension.      Aortic Valve: There is trace regurgitation.      ASSESSMENT AND PLAN:  In summary, patient is a 48 year old very with above past medical history including recent admission for cardiogenic shock with very significant elevated wedge pressure 38 mmHg and reduced cardiac index who was referred for further evaluation for potential advanced heart failure therapies.    We did  have some evaluation at the [facility] with right heart catheterization as well as echocardiograms performed.  Echo showed improved ejection fraction as discussed above as well as the right heart catheterization showed essentially normal filling pressures and normal cardiac index.  With this we decided to continue medical therapy at this time.  That she suffered unfortunate stroke in between but hopefully this will have minimal residual effects and she continues to improve.  Overall no new complaints unfortunately she is extremely stressed today given the situation with her grandchild.  Understandably she is upset and anxious and blood pressure is elevated.  As such we are not going to make any medication changes.  Blood pressure at home seems to be running much better and in the normal range.  I will think at this time we will continue medication will evaluate her with an echocardiogram when she is coming back for an appointment in November.  Otherwise we will see her back in January February next year.     I appreciate the opportunity to participate in the care of Jamilah Jiménez . Please do not hesitate to contact me with any further questions.  I have spent a total of 40 minutes today reviewing labs, imaging studies, discussing with colleagues, face-to-face time with patient and documentation in the medical record.  The longitudinal plan of care for the diagnosis(es)/condition(s) as documented were addressed during this visit. Due to the added complexity in care, I will continue to support Jamilah in the subsequent management and with ongoing continuity of care.     Sincerely,   Jaret Saucedo MD  [facility] of Cardiology       Please do not hesitate to contact me if you have any questions/concerns.     Sincerely,     Jaret Saucedo MD

## 2024-10-03 NOTE — PROGRESS NOTES
October 2, 2024     Dear Colleagues,  I had the pleasure of seeing Jamilah Jiménez  in the [facility]. As you know she is a 48 year old female with HFrEF, Class III, Stage C-D, DM2, HTN, history of PE who was referred to the clinic for further evaluation and potential advanced heart failure therapies consideration. She was admitted 10/2022 following a RHC that showed cardiogenic shock with elevated filling pressures, pulmonary hypertension and low cardiac output (RA 12, PA 75/33 (48), PW 38, Amina CI 1.7). Note that she did have several ER visits over the past years.    Patient previously followed with [facility].  Had LHC in 2020 with no significant CAD reportedly.  Presented to Dr. Allen's clinic 6/1/22 and found to be in decompensated HF.  Was admitted for IV diuretics.  LVEF at that time reported 20-25%.  Transferred to [facility].  Noted to have ION and hypotension.  Was given IVF resuscitation, ION and hypotension felt to be 2/2 overdiuresis.  Limited TTE while hospitalized with LVEF 35-40% Reintroduced HF GDMT at reduced doses while hospitalized and discharged off diuretics but had diuretics uptitrated as outpatient.  Was admitted 7/15 with ION and hypokalemia.  Potassium was replaced and ION resolved.  Of note PCWP was 38 when weight was 169 lbs. She was diuresed with symptom improvement (less orthopnea and chest heaviness) however weight remained 168-170 lbs. Neg 9.8 L however I/O off with no intake documented at times. Had mild ION with aggressive diuresing however when slowed down, renal function improved. Entresto titrated up to high dose. With her ION, spironolactone was stopped but will reintroduce at discharge at lower dose lawanda to help her hypokalemia.   She did have a stroke unfortunately recently and without area she did have some weakness in the left leg and also has some residual tingling and numbness in the left fingers.  Her strength is little bit reduced and uses  a cane to walk since.   She is here for here for follow-up today.  She notes overall she is doing well however unfortunately she did bring her grandchild who is 3-month old for a routine check however it turned out that he had multiple issues including potential brain bleed as well as renal failure.  The details are pending at this time but obviously she is very upset and nervous about the situation.  Nevertheless it is very respectful that she came to the visit still.  Denies any new complaints her blood pressure at home has been running normal, understandably blood pressure here was elevated.  But no other significant issues    PAST MEDICAL HISTORY:  Acute on chronic systolic heart failure  Cardiogenic shock -   ION during admission  Hypokalemia  T2DM with hypergylemia  Primary HTN  Hyperlipidemia  Depression, anxiety     FAMILY HISTORY:  No relevant past family history      PAST MEDICAL HISTORY:  Past Medical History:   Diagnosis Date    Anxiety     Diabetes (H)     TYPE II    Hypertension      FAMILY HISTORY:  No family history on file.  SOCIAL HISTORY:  Social History     Socioeconomic History    Marital status: Single   Tobacco Use    Smoking status: Never    Smokeless tobacco: Never   Vaping Use    Vaping status: Never Used   Substance and Sexual Activity    Alcohol use: Not Currently    Drug use: Not Currently     Social Determinants of Health     Financial Resource Strain: Low Risk  (12/13/2023)    [facility]    Overall Financial Resource Strain (CARDIA)     Difficulty of Paying Living Expenses: Not very hard   Food Insecurity: No Food Insecurity (12/13/2023)    [facility]    Hunger Vital Sign     Worried About Running Out of Food in the Last Year: Never true     Ran Out of Food in the Last Year: Never true   Transportation Needs: No Transportation Needs (12/13/2023)     [facility]    PRAPARE - Transportation     Lack of Transportation (Medical): No     Lack of Transportation (Non-Medical): No   Physical Activity: Unknown (5/21/2021)    Received from [facility]    Exercise Vital Sign     Days of Exercise per Week: Patient declined     Minutes of Exercise per Session: Patient declined   Stress: Unknown (5/21/2021)    [facility]    [employer] - Occupational Stress Questionnaire     Feeling of Stress : Patient declined   Social Connections: Unknown (5/21/2021)    Received from [facility]    Social Connection and Isolation Panel [NHANES]     Frequency of Communication with Friends and Family: Patient declined     Frequency of Social Gatherings with Friends and Family: Patient declined     Attends Congregation Services: Patient declined     Active Member of Clubs or Organizations: Patient declined     Attends Club or Organization Meetings: Patient declined     Marital Status: Patient declined   Interpersonal Safety: Unknown (5/21/2021)    [facility]    Humiliation, Afraid, Rape, and Kick questionnaire     Fear of Current or Ex-Partner: Patient declined     Emotionally Abused: Patient declined     Physically Abused: Patient declined     Sexually Abused: Patient declined   Housing Stability: Low Risk  (12/13/2023)    [facility]    Housing Stability Vital Sign     Unable to Pay for Housing in the Last Year: No     Number of Places Lived in the Last Year: 1     Unstable Housing in the Last Year: No     CURRENT MEDICATIONS:  Current Outpatient Medications   Medication Sig Dispense Refill    atorvastatin (LIPITOR) 80  MG tablet 1 tablet (80 mg) by Oral or Feeding Tube route At Bedtime 30 tablet 0    dapagliflozin (FARXIGA) 10 MG TABS tablet 1 tablet (10 mg) by Oral or Feeding Tube route every morning 90 tablet 1    folic acid (FOLVITE) 1 MG tablet Take 1 tablet (1 mg) by mouth daily 30 tablet 0    hydrOXYzine (ATARAX) 25 MG tablet Take 1 tablet (25 mg) by mouth every 6 hours as needed for anxiety (adjuvant pain) 30 tablet 0    insulin aspart (NOVOLOG FLEXPEN) 100 UNIT/ML pen Inject 1-10 Units Subcutaneous 4 times daily (with meals and nightly) SLIDING SCALE      insulin detemir (LEVEMIR PEN) 100 UNIT/ML pen Inject 40 Units Subcutaneous At Bedtime 15 mL 0    metoprolol succinate ER (TOPROL XL) 25 MG 24 hr tablet Take 1 tablet (25 mg) by mouth daily 30 tablet 0    nitroGLYcerin (NITROSTAT) 0.4 MG sublingual tablet For chest pain place 1 tablet under the tongue every 5 minutes for 3 doses. If symptoms persist 5 minutes after 1st dose call 911. 30 tablet 0    potassium chloride ER (KLOR-CON M) 20 MEQ CR tablet Take 1 tablet (20 mEq) by mouth 2 times daily 60 tablet 0    sacubitril-valsartan (ENTRESTO) 49-51 MG per tablet Take 1 tablet by mouth 2 times daily 60 tablet 0    sertraline (ZOLOFT) 50 MG tablet 3 tablets (150 mg) by Oral or Feeding Tube route daily 30 tablet 0    spironolactone (ALDACTONE) 25 MG tablet 1 tablet (25 mg) by Oral or Feeding Tube route every morning 30 tablet 0    torsemide (DEMADEX) 20 MG tablet Take 100 mg by mouth 2 times daily      traZODone (DESYREL) 50 MG tablet Take 1 tablet (50 mg) by mouth nightly as needed for sleep 30 tablet 0     No current facility-administered medications for this visit.       EXAM:  Blood pressure today is 180/92 mmHg again understandably.  Heart rate is 86 bpm weight is 177 pounds.  General: appears comfortable, alert and oriented  Head: normocephalic, atraumatic  Eyes: anicteric sclera, EOMI , PERRL  Neck: no adenopathy  Orophyarynx: moist mucosa, no lesions noted  Heart:  regular, S1/S2, no murmurs, rubs or gallop. Estimated JVP at 5 cmH2O  Lungs: CTAB, No wheezing.   Abdomen: soft, non-tender, bowel sounds present, no hepatosplenomegaly  Extremities: No LE edema today  Skin: no open lesions noted  Neuro: grossly non-focal     Labs:  Lab Results   Component Value Date    WBC 3.5 (L) 09/07/2023    HGB 11.2 (L) 09/07/2023    HCT 32.8 (L) 09/07/2023     09/07/2023     09/11/2023    POTASSIUM 4.2 09/11/2023    CHLORIDE 110 (H) 09/11/2023    CO2 22 09/11/2023    BUN 5.8 (L) 09/11/2023    CR 0.96 (H) 09/11/2023     (H) 09/11/2023    NTBNPI 1,425 (H) 08/31/2023    AST 23 09/02/2023    ALT 13 09/02/2023    ALKPHOS 93 09/02/2023    BILITOTAL 1.0 09/02/2023    INR 1.15 09/03/2023      TTE 6/30/22:  Left Ventricle: The left ventricle is severely dilated. There is  mild-to-moderate left ventricular hypertrophy. Severely reduced left ventricular systolic function. The EF is visually estimated to be 30-35%. Severe global hypokinesis of the left ventricular wall segments.   Right Ventricle: The right ventricle appears normal in size. Systolic function is normal.   Mitral Valve: There is mild regurgitation.   IVC/SVC: Normal IVC size with normal respirophasic changes. Pulmonic Artery: There is no pulmonary hypertension.       TTE 10/6/22:  Left Ventricle: The left ventricle is severely dilated. There is mild-to-moderate left ventricular hypertrophy. The EF is visually estimated to be 30-35%. Severe global hypokinesis of the left ventricular wall segments. LVEDD 6.2cm     RHC 8/31/23:  RA: 5/6/4mmHg  RV: 34/4mmHg  PA: 34/14/20mmHg  W: 14/14/10mmHg  Amina CO/CI: 5.35/2.82  Thermo CO/CI: 7.6/4  PVR: 2.2 Right sided filling pressures are normal. Left sided filling pressures are normal. Normal PA pressures. Normal cardiac output level.     TTE 10/8/23:    Left Ventricle: Severely reduced left ventricular systolic function. The ejection fraction, measured by biplane, is 26%. Global  hypokinesis of the left ventricular wall segments. Grade III diastolic dysfunction.     Right Ventricle: The right ventricle appears normal in size. The RVSP measures 34 mmHg. There is mild pulmonary hypertension.     Aortic Valve: The aortic valve is tricuspid.     Mitral Valve: Mitral valve structure is normal.     Aorta: The sinus of Valsalva is normal. The ascending aorta is normal.     Pericardium: There is no pericardial effusion.     Tricuspid Valve: Tricuspid valve structure is normal.     Left Atrium: The left atrium is mildly dilated.      TTE 12/23/23:    Left Ventricle: The left ventricle is severely dilated. Wall thickness is normal. Severely reduced left ventricular systolic function. The EF is visually estimated to be 30-35%. Severe global hypokinesis of the left ventricular wall segments. Left ventricular filling pressure is normal.     Right Ventricle: The right ventricle appears normal in size. Systolic function is normal.     Left Atrium: The left atrium is normal in size.     Mitral Valve: There is trace regurgitation.     Tricuspid Valve: There is trace regurgitation.     Pulmonic Artery: There is no pulmonary hypertension.     Aortic Valve: There is trace regurgitation.      ASSESSMENT AND PLAN:  In summary, patient is a 48 year old very with above past medical history including recent admission for cardiogenic shock with very significant elevated wedge pressure 38 mmHg and reduced cardiac index who was referred for further evaluation for potential advanced heart failure therapies.    We did have some evaluation at the [facility] with right heart catheterization as well as echocardiograms performed.  Echo showed improved ejection fraction as discussed above as well as the right heart catheterization showed essentially normal filling pressures and normal cardiac index.  With this we decided to continue medical therapy at this time.  That she suffered unfortunate stroke in between but  hopefully this will have minimal residual effects and she continues to improve.  Overall no new complaints unfortunately she is extremely stressed today given the situation with her grandchild.  Understandably she is upset and anxious and blood pressure is elevated.  As such we are not going to make any medication changes.  Blood pressure at home seems to be running much better and in the normal range.  I will think at this time we will continue medication will evaluate her with an echocardiogram when she is coming back for an appointment in November.  Otherwise we will see her back in January February next year.     I appreciate the opportunity to participate in the care of Jamilah Jiménez . Please do not hesitate to contact me with any further questions.  I have spent a total of 40 minutes today reviewing labs, imaging studies, discussing with colleagues, face-to-face time with patient and documentation in the medical record.  The longitudinal plan of care for the diagnosis(es)/condition(s) as documented were addressed during this visit. Due to the added complexity in care, I will continue to support Jamilah in the subsequent management and with ongoing continuity of care.     Sincerely,   Jaret Saucedo MD  [facility] Division of Cardiology

## 2024-10-06 ENCOUNTER — HEALTH MAINTENANCE LETTER (OUTPATIENT)
Age: 49
End: 2024-10-06

## 2024-12-15 ENCOUNTER — HEALTH MAINTENANCE LETTER (OUTPATIENT)
Age: 49
End: 2024-12-15

## 2025-01-19 ENCOUNTER — HEALTH MAINTENANCE LETTER (OUTPATIENT)
Age: 50
End: 2025-01-19

## 2025-04-27 ENCOUNTER — HEALTH MAINTENANCE LETTER (OUTPATIENT)
Age: 50
End: 2025-04-27

## 2025-08-10 ENCOUNTER — HEALTH MAINTENANCE LETTER (OUTPATIENT)
Age: 50
End: 2025-08-10

## (undated) DEVICE — KIT MANIFOLD NAMIC STANDARD 72IN RIGHT HEART 2 PT 613000213

## (undated) DEVICE — INTRO SHEATH 7FRX10CM PINNACLE RSS702

## (undated) DEVICE — Device

## (undated) DEVICE — KIT RIGHT HEART CATH 60130719

## (undated) DEVICE — PACK HEART RIGHT CUSTOM SAN32RHF18

## (undated) RX ORDER — FENTANYL CITRATE 50 UG/ML
INJECTION, SOLUTION INTRAMUSCULAR; INTRAVENOUS
Status: DISPENSED
Start: 2023-09-11

## (undated) RX ORDER — LIDOCAINE HYDROCHLORIDE 10 MG/ML
INJECTION, SOLUTION EPIDURAL; INFILTRATION; INTRACAUDAL; PERINEURAL
Status: DISPENSED
Start: 2023-09-06

## (undated) RX ORDER — SIMETHICONE 40MG/0.6ML
SUSPENSION, DROPS(FINAL DOSAGE FORM)(ML) ORAL
Status: DISPENSED
Start: 2023-09-11

## (undated) RX ORDER — ACETAMINOPHEN 500 MG
TABLET ORAL
Status: DISPENSED
Start: 2023-08-31

## (undated) RX ORDER — LIDOCAINE 40 MG/G
CREAM TOPICAL
Status: DISPENSED
Start: 2023-08-31